# Patient Record
Sex: FEMALE | Race: WHITE | NOT HISPANIC OR LATINO | Employment: FULL TIME | URBAN - METROPOLITAN AREA
[De-identification: names, ages, dates, MRNs, and addresses within clinical notes are randomized per-mention and may not be internally consistent; named-entity substitution may affect disease eponyms.]

---

## 2018-10-11 ENCOUNTER — TRANSCRIBE ORDERS (OUTPATIENT)
Dept: ADMINISTRATIVE | Facility: HOSPITAL | Age: 58
End: 2018-10-11

## 2018-10-11 DIAGNOSIS — Z12.31 VISIT FOR SCREENING MAMMOGRAM: Primary | ICD-10-CM

## 2020-01-03 ENCOUNTER — OFFICE VISIT (OUTPATIENT)
Dept: FAMILY MEDICINE CLINIC | Facility: CLINIC | Age: 60
End: 2020-01-03

## 2020-01-03 VITALS
OXYGEN SATURATION: 95 % | DIASTOLIC BLOOD PRESSURE: 60 MMHG | RESPIRATION RATE: 16 BRPM | HEART RATE: 81 BPM | TEMPERATURE: 98.4 F | BODY MASS INDEX: 24.41 KG/M2 | HEIGHT: 64 IN | SYSTOLIC BLOOD PRESSURE: 128 MMHG | WEIGHT: 143 LBS

## 2020-01-03 DIAGNOSIS — J15.9 COMMUNITY ACQUIRED BACTERIAL PNEUMONIA: Primary | ICD-10-CM

## 2020-01-03 DIAGNOSIS — E03.9 ACQUIRED HYPOTHYROIDISM: ICD-10-CM

## 2020-01-03 DIAGNOSIS — E87.1 HYPONATREMIA: ICD-10-CM

## 2020-01-03 PROCEDURE — 99203 OFFICE O/P NEW LOW 30 MIN: CPT | Performed by: FAMILY MEDICINE

## 2020-01-03 RX ORDER — DOXYCYCLINE HYCLATE 100 MG/1
100 CAPSULE ORAL 2 TIMES DAILY
COMMUNITY
End: 2020-01-09

## 2020-01-03 RX ORDER — CITALOPRAM 40 MG/1
1 TABLET ORAL DAILY
COMMUNITY
Start: 2019-12-12 | End: 2020-06-08 | Stop reason: SDUPTHER

## 2020-01-03 RX ORDER — ALBUTEROL SULFATE 90 UG/1
2 AEROSOL, METERED RESPIRATORY (INHALATION) EVERY 4 HOURS PRN
COMMUNITY
End: 2020-06-08 | Stop reason: SDUPTHER

## 2020-01-03 NOTE — PATIENT INSTRUCTIONS
Go to the nearest ER or return to clinic if symptoms worsen, fever/chill develop    Hyponatremia    Hyponatremia is when the amount of salt (sodium) in your blood is too low. When salt levels are low, your cells absorb extra water and they swell. The swelling happens throughout the body, but it mostly affects the brain.  Follow these instructions at home:  · Take medicines only as told by your doctor. Many medicines can make this condition worse. Talk with your doctor about any medicines that you are currently taking.  · Carefully follow a recommended diet as told by your doctor.  · Carefully follow instructions from your doctor about fluid restrictions.  · Keep all follow-up visits as told by your doctor. This is important.  · Do not drink alcohol.  Contact a doctor if:  · You feel sicker to your stomach (nauseous).  · You feel more confused.  · You feel more tired (fatigued).  · Your headache gets worse.  · You feel weaker.  · Your symptoms go away and then they come back.  · You have trouble following the diet instructions.  Get help right away if:  · You start to twitch and shake (have a seizure).  · You pass out (faint).  · You keep having watery poop (diarrhea).  · You keep throwing up (vomiting).  This information is not intended to replace advice given to you by your health care provider. Make sure you discuss any questions you have with your health care provider.  Document Released: 08/29/2012 Document Revised: 05/25/2017 Document Reviewed: 12/14/2015  [employer] Interactive Patient Education © 2019 [employer]

## 2020-01-03 NOTE — PROGRESS NOTES
Subjective   Florence Dee is a 59 y.o. female.   Chief Complaint   Patient presents with   • Establish Care   • Pneumonia     [facility]     History of Present Illness   She is here to establish care.   She was discharged from [facility] yesterday after being admitted last week to treat sepsis, CAP, hypoxia, asthma exacerbation, and hyponatremia.   History of tobacco abuse, has stopped since hospital admission.   While inpatient, she was placed on low sodium diet and she is unsure why. This is what prolonged her discharge, hyponatremia.   Discharged yesterday evening. Started medrol dose pack and doxycycline this morning. She does have oxygen to wear at home.    She is weak and fatigued. Shortness of breath is improving.    Cough is productive, but sputum production is becoming less. She is using nebulizer every 4 hours as directed.     She has hypothyroidism. Previously had Grave's disease.   Currently treated with levothyroxine 125 mcg daily.    The following portions of the patient's history were reviewed and updated as appropriate: allergies, current medications, past family history, past medical history, past social history, past surgical history and problem list.    Review of Systems   Constitutional: Positive for fatigue. Negative for chills and fever.   HENT: Negative.    Respiratory: Positive for cough and shortness of breath. Negative for chest tightness and wheezing.    Cardiovascular: Negative for chest pain, palpitations and leg swelling.   Endocrine: Negative for cold intolerance and heat intolerance.   Skin: Negative for rash.   Neurological: Negative for light-headedness and headache.   Hematological: Negative for adenopathy. Does not bruise/bleed easily.   Psychiatric/Behavioral: Negative.        Objective   Physical Exam   Constitutional: She is oriented to person, place, and time. She appears well-developed and well-nourished.   HENT:   Head: Normocephalic and atraumatic.   Right Ear: Tympanic  membrane, external ear and ear canal normal.   Left Ear: Tympanic membrane, external ear and ear canal normal.   Nose: Nose normal.   Mouth/Throat: Uvula is midline and oropharynx is clear and moist.   Eyes: Conjunctivae are normal.   Neck: Neck supple. No thyromegaly present.   Cardiovascular: Normal rate, regular rhythm and normal heart sounds.   No murmur heard.  Pulmonary/Chest: Effort normal. She has decreased breath sounds in the right lower field. She has no wheezes. She has no rhonchi.   Musculoskeletal: She exhibits no edema or deformity.   Lymphadenopathy:     She has no cervical adenopathy.   Neurological: She is alert and oriented to person, place, and time.   Skin: Skin is warm and dry.   Psychiatric: She has a normal mood and affect. Her behavior is normal.   Nursing note and vitals reviewed.        Assessment/Plan   Florence was seen today for establish care and pneumonia.    Diagnoses and all orders for this visit:    Community acquired bacterial pneumonia  -     CBC & Differential; Future  -     Comprehensive Metabolic Panel; Future  -     XR Chest PA & Lateral; Future    Hyponatremia  -     CBC & Differential; Future  -     Comprehensive Metabolic Panel; Future    Acquired hypothyroidism  -     TSH+Free T4; Future    She had labs checked yesterday before discharge, hyponatremia still present. Advised her to resume normal diet and return on Monday to recheck this.   Advised to continue holding citalopram until sodium corrects.   Plan to repeat CXR in 1 month. She will notify me if symptoms don't resolve or if they start to worsen.     Will address routine health maintenance on next routine visit.

## 2020-01-07 ENCOUNTER — TELEPHONE (OUTPATIENT)
Dept: FAMILY MEDICINE CLINIC | Facility: CLINIC | Age: 60
End: 2020-01-07

## 2020-01-07 DIAGNOSIS — E87.1 HYPONATREMIA: ICD-10-CM

## 2020-01-07 DIAGNOSIS — J15.9 COMMUNITY ACQUIRED BACTERIAL PNEUMONIA: Primary | ICD-10-CM

## 2020-01-07 LAB
ALBUMIN SERPL-MCNC: 3.8 G/DL (ref 3.5–5.2)
ALBUMIN/GLOB SERPL: 1.2 G/DL
ALP SERPL-CCNC: 113 U/L (ref 39–117)
ALT SERPL-CCNC: 116 U/L (ref 1–33)
AST SERPL-CCNC: 22 U/L (ref 1–32)
BASOPHILS # BLD AUTO: 0.05 10*3/MM3 (ref 0–0.2)
BASOPHILS NFR BLD AUTO: 0.2 % (ref 0–1.5)
BILIRUB SERPL-MCNC: 0.4 MG/DL (ref 0.2–1.2)
BUN SERPL-MCNC: 26 MG/DL (ref 6–20)
BUN/CREAT SERPL: 38.2 (ref 7–25)
CALCIUM SERPL-MCNC: 9.4 MG/DL (ref 8.6–10.5)
CHLORIDE SERPL-SCNC: 91 MMOL/L (ref 98–107)
CO2 SERPL-SCNC: 28.4 MMOL/L (ref 22–29)
CREAT SERPL-MCNC: 0.68 MG/DL (ref 0.57–1)
EOSINOPHIL # BLD AUTO: 0.06 10*3/MM3 (ref 0–0.4)
EOSINOPHIL NFR BLD AUTO: 0.3 % (ref 0.3–6.2)
ERYTHROCYTE [DISTWIDTH] IN BLOOD BY AUTOMATED COUNT: 12.7 % (ref 12.3–15.4)
GLOBULIN SER CALC-MCNC: 3.2 GM/DL
GLUCOSE SERPL-MCNC: 127 MG/DL (ref 65–99)
HCT VFR BLD AUTO: 42 % (ref 34–46.6)
HGB BLD-MCNC: 14.5 G/DL (ref 12–15.9)
IMM GRANULOCYTES # BLD AUTO: 0.35 10*3/MM3 (ref 0–0.05)
IMM GRANULOCYTES NFR BLD AUTO: 1.6 % (ref 0–0.5)
LYMPHOCYTES # BLD AUTO: 1.62 10*3/MM3 (ref 0.7–3.1)
LYMPHOCYTES NFR BLD AUTO: 7.2 % (ref 19.6–45.3)
MCH RBC QN AUTO: 32 PG (ref 26.6–33)
MCHC RBC AUTO-ENTMCNC: 34.5 G/DL (ref 31.5–35.7)
MCV RBC AUTO: 92.7 FL (ref 79–97)
MONOCYTES # BLD AUTO: 0.76 10*3/MM3 (ref 0.1–0.9)
MONOCYTES NFR BLD AUTO: 3.4 % (ref 5–12)
NEUTROPHILS # BLD AUTO: 19.62 10*3/MM3 (ref 1.7–7)
NEUTROPHILS NFR BLD AUTO: 87.3 % (ref 42.7–76)
NRBC BLD AUTO-RTO: 0 /100 WBC (ref 0–0.2)
PLATELET # BLD AUTO: 549 10*3/MM3 (ref 140–450)
POTASSIUM SERPL-SCNC: 4.5 MMOL/L (ref 3.5–5.2)
PROT SERPL-MCNC: 7 G/DL (ref 6–8.5)
RBC # BLD AUTO: 4.53 10*6/MM3 (ref 3.77–5.28)
SODIUM SERPL-SCNC: 133 MMOL/L (ref 136–145)
T4 FREE SERPL-MCNC: 1.47 NG/DL (ref 0.93–1.7)
TSH SERPL DL<=0.005 MIU/L-ACNC: 4.95 UIU/ML (ref 0.27–4.2)
WBC # BLD AUTO: 22.46 10*3/MM3 (ref 3.4–10.8)

## 2020-01-07 NOTE — TELEPHONE ENCOUNTER
Patient called to check to see if her blood work results was back yet. Please call patient once the blood work comes back.    Patient call back [phone]      Please advise

## 2020-01-08 ENCOUNTER — RESULTS ENCOUNTER (OUTPATIENT)
Dept: FAMILY MEDICINE CLINIC | Facility: CLINIC | Age: 60
End: 2020-01-08

## 2020-01-08 DIAGNOSIS — E87.1 HYPONATREMIA: ICD-10-CM

## 2020-01-08 DIAGNOSIS — E03.9 ACQUIRED HYPOTHYROIDISM: ICD-10-CM

## 2020-01-08 DIAGNOSIS — J15.9 COMMUNITY ACQUIRED BACTERIAL PNEUMONIA: ICD-10-CM

## 2020-01-09 ENCOUNTER — OFFICE VISIT (OUTPATIENT)
Dept: FAMILY MEDICINE CLINIC | Facility: CLINIC | Age: 60
End: 2020-01-09

## 2020-01-09 VITALS
SYSTOLIC BLOOD PRESSURE: 100 MMHG | HEART RATE: 82 BPM | DIASTOLIC BLOOD PRESSURE: 60 MMHG | HEIGHT: 64 IN | OXYGEN SATURATION: 95 % | WEIGHT: 140.8 LBS | TEMPERATURE: 97.5 F | RESPIRATION RATE: 16 BRPM | BODY MASS INDEX: 24.04 KG/M2

## 2020-01-09 DIAGNOSIS — R09.02 HYPOXIC: ICD-10-CM

## 2020-01-09 DIAGNOSIS — E87.1 HYPONATREMIA: ICD-10-CM

## 2020-01-09 DIAGNOSIS — J15.9 COMMUNITY ACQUIRED BACTERIAL PNEUMONIA: Primary | ICD-10-CM

## 2020-01-09 PROCEDURE — 99213 OFFICE O/P EST LOW 20 MIN: CPT | Performed by: FAMILY MEDICINE

## 2020-01-09 RX ORDER — ALBUTEROL SULFATE 0.63 MG/3ML
1 SOLUTION RESPIRATORY (INHALATION) DAILY PRN
COMMUNITY
Start: 2020-01-03

## 2020-01-09 NOTE — PATIENT INSTRUCTIONS
Go to the nearest ER or return to clinic if symptoms worsen, fever/chill develop    Community-Acquired Pneumonia, Adult  Pneumonia is an infection of the lungs. It causes swelling in the airways of the lungs. Mucus and fluid may also build up inside the airways.  One type of pneumonia can happen while a person is in a hospital. A different type can happen when a person is not in a hospital (community-acquired pneumonia).   What are the causes?    This condition is caused by germs (viruses, bacteria, or fungi). Some types of germs can be passed from one person to another. This can happen when you breathe in droplets from the cough or sneeze of an infected person.  What increases the risk?  You are more likely to develop this condition if you:  · Have a long-term (chronic) disease, such as:  ? Chronic obstructive pulmonary disease (COPD).  ? Asthma.  ? Cystic fibrosis.  ? Congestive heart failure.  ? Diabetes.  ? Kidney disease.  · Have HIV.  · Have sickle cell disease.  · Have had your spleen removed.  · Do not take good care of your teeth and mouth (poor dental hygiene).  · Have a medical condition that increases the risk of breathing in droplets from your own mouth and nose.  · Have a weakened body defense system (immune system).  · Are a smoker.  · Travel to areas where the germs that cause this illness are common.  · Are around certain animals or the places they live.  What are the signs or symptoms?  · A dry cough.  · A wet (productive) cough.  · Fever.  · Sweating.  · Chest pain. This often happens when breathing deeply or coughing.  · Fast breathing or trouble breathing.  · Shortness of breath.  · Shaking chills.  · Feeling tired (fatigue).  · Muscle aches.  How is this treated?  Treatment for this condition depends on many things. Most adults can be treated at home. In some cases, treatment must happen in a hospital. Treatment may include:  · Medicines given by mouth or through an IV tube.  · Being given  extra oxygen.  · Respiratory therapy.  In rare cases, treatment for very bad pneumonia may include:  · Using a machine to help you breathe.  · Having a procedure to remove fluid from around your lungs.  Follow these instructions at home:  Medicines  · Take over-the-counter and prescription medicines only as told by your doctor.  ? Only take cough medicine if you are losing sleep.  · If you were prescribed an antibiotic medicine, take it as told by your doctor. Do not stop taking the antibiotic even if you start to feel better.  General instructions    · Sleep with your head and neck raised (elevated). You can do this by sleeping in a recliner or by putting a few pillows under your head.  · Rest as needed. Get at least 8 hours of sleep each night.  · Drink enough water to keep your pee (urine) pale yellow.  · Eat a healthy diet that includes plenty of vegetables, fruits, whole grains, low-fat dairy products, and lean protein.  · Do not use any products that contain nicotine or tobacco. These include cigarettes, e-cigarettes, and chewing tobacco. If you need help quitting, ask your doctor.  · Keep all follow-up visits as told by your doctor. This is important.  How is this prevented?  A shot (vaccine) can help prevent pneumonia. Shots are often suggested for:  · People older than 65 years of age.  · People older than 19 years of age who:  ? Are having cancer treatment.  ? Have long-term (chronic) lung disease.  ? Have problems with their body's defense system.  You may also prevent pneumonia if you take these actions:  · Get the flu (influenza) shot every year.  · Go to the dentist as often as told.  · Wash your hands often. If you cannot use soap and water, use hand .  Contact a doctor if:  · You have a fever.  · You lose sleep because your cough medicine does not help.  Get help right away if:  · You are short of breath and it gets worse.  · You have more chest pain.  · Your sickness gets worse. This is  very serious if:  ? You are an older adult.  ? Your body's defense system is weak.  · You cough up blood.  Summary  · Pneumonia is an infection of the lungs.  · Most adults can be treated at home. Some will need treatment in a hospital.  · Drink enough water to keep your pee pale yellow.  · Get at least 8 hours of sleep each night.  This information is not intended to replace advice given to you by your health care provider. Make sure you discuss any questions you have with your health care provider.  Document Released: 06/05/2009 Document Revised: 08/15/2019 Document Reviewed: 08/15/2019  [employer] Interactive Patient Education © 2019 [employer]

## 2020-01-09 NOTE — PROGRESS NOTES
Subjective   Florence Dee is a 59 y.o. female.   Chief Complaint   Patient presents with   • Cough     congestion, cough      History of Present Illness   She continues to have cough, has worsened since her visit last week. She was discharged last week from hospital for treatment of pneumonia.   She is feeling pain in back, lower right. Feels congestion in chest, but unable to produce with cough.   Has oxygen at home, but didn't wear into her appointment today. Oxygen is in car.   She was discharged with doxycycline and medrol dose [place], has completed both.  Repeated labs 3 days ago. Sodium continues to improve, only low by 3 points now. WBC was significantly elevated, however was taking steroid taper at that time.     The following portions of the patient's history were reviewed and updated as appropriate: allergies, current medications, past family history, past medical history, past social history, past surgical history and problem list.    Review of Systems   Constitutional: Positive for activity change and fatigue.   HENT: Negative for congestion and postnasal drip.    Respiratory: Positive for cough, chest tightness, shortness of breath and wheezing.    Cardiovascular: Positive for chest pain (right posterior). Negative for palpitations and leg swelling.   Gastrointestinal: Negative for abdominal pain.   Neurological: Negative for dizziness, light-headedness and confusion.       Objective   Physical Exam   Constitutional: She is oriented to person, place, and time. She appears well-developed and well-nourished.   HENT:   Head: Normocephalic and atraumatic.   Right Ear: External ear normal.   Left Ear: External ear normal.   Nose: Nose normal.   Eyes: Conjunctivae are normal.   Neck: Neck supple.   Cardiovascular: Normal rate, regular rhythm and normal heart sounds.   No murmur heard.  Pulmonary/Chest: Effort normal. She has decreased breath sounds. She has wheezes in the right upper field, the right middle  field, the right lower field, the left upper field, the left middle field and the left lower field. She has rhonchi in the right lower field and the left lower field.   Musculoskeletal: She exhibits no edema.   Neurological: She is alert and oriented to person, place, and time.   Skin: Skin is warm and dry.   Psychiatric: She has a normal mood and affect. Her behavior is normal.   Nursing note and vitals reviewed.        Assessment/Plan   Florence was seen today for cough.    Diagnoses and all orders for this visit:    Community acquired bacterial pneumonia    Hyponatremia    Hypoxic      She was initially 88% RA when she arrived, not wearing her portable oxygen. This did improve with O2 NC.   With her worsening presentation and completing doxycycline and steroid taper, she has been advised to return to ER for further evaluation and treatment. She agrees and plans to return to [facility], where she was previously admitted.

## 2020-01-12 ENCOUNTER — RESULTS ENCOUNTER (OUTPATIENT)
Dept: FAMILY MEDICINE CLINIC | Facility: CLINIC | Age: 60
End: 2020-01-12

## 2020-01-12 DIAGNOSIS — E87.1 HYPONATREMIA: ICD-10-CM

## 2020-01-12 DIAGNOSIS — J15.9 COMMUNITY ACQUIRED BACTERIAL PNEUMONIA: ICD-10-CM

## 2020-01-15 ENCOUNTER — TELEPHONE (OUTPATIENT)
Dept: FAMILY MEDICINE CLINIC | Facility: CLINIC | Age: 60
End: 2020-01-15

## 2020-01-15 RX ORDER — OLMESARTAN MEDOXOMIL 20 MG/1
20 TABLET ORAL DAILY
Qty: 90 TABLET | Refills: 0 | Status: SHIPPED | OUTPATIENT
Start: 2020-01-15 | End: 2020-01-16 | Stop reason: ALTCHOICE

## 2020-01-15 NOTE — TELEPHONE ENCOUNTER
Called and spoke to patient, informed that medication was sent to pharmacy. She states that she did go to the hospital ([facility]) after her appointment on 1/9/2020 and that she is getting discharged today.

## 2020-01-15 NOTE — TELEPHONE ENCOUNTER
Medication refilled.   Did she end up going back to the ER after her last appointment on 1/9/20? She recently had pneumonia.

## 2020-01-16 ENCOUNTER — TELEPHONE (OUTPATIENT)
Dept: FAMILY MEDICINE CLINIC | Facility: CLINIC | Age: 60
End: 2020-01-16

## 2020-01-16 RX ORDER — OLMESARTAN MEDOXOMIL AND HYDROCHLOROTHIAZIDE 40/12.5 40; 12.5 MG/1; MG/1
1 TABLET ORAL DAILY
Qty: 90 TABLET | Refills: 1 | Status: SHIPPED | OUTPATIENT
Start: 2020-01-16 | End: 2020-06-08 | Stop reason: SDUPTHER

## 2020-01-16 NOTE — TELEPHONE ENCOUNTER
Patient states that they didn't have the combination pill in the hospital while she was there. She states that they gave her Benicar 20 mg two tablets and Lasix at that time. She would like to go back to her original prescription if possible. Please send to [facility].

## 2020-01-16 NOTE — TELEPHONE ENCOUNTER
Pt called saying that for years she has taken olmesartan-hctz 40-12.5 MG, but the Rx the was sent to the pharmacy yesterday was olmesartan (BENICAR) 20 MG tablet. Pt would like to know if she should take the new Rx or if what she has previously taken can be sent to the pharmacy?    [address]

## 2020-01-16 NOTE — TELEPHONE ENCOUNTER
Medication changed to initial prescription. Please cancel Benicar 20mg at pharmacy.     Did her sodium level improve while in the hospital the second stay?

## 2020-01-16 NOTE — TELEPHONE ENCOUNTER
Canceled the plain Benicar at pharm and spoke w/ pt. She say's, her sodium level did get better while she was in the hospital.

## 2020-01-16 NOTE — TELEPHONE ENCOUNTER
I refilled what was requested and what was active in her chart. Looking at the discharge summary from her initial hospital stay, she was discharged with Benicar 40 mg daily.     Does she have her most recent discharge medication list? What blood pressure medication is listed? They may have stopped her HCTZ during the initial stay due to low sodium.

## 2020-01-22 ENCOUNTER — OFFICE VISIT (OUTPATIENT)
Dept: FAMILY MEDICINE CLINIC | Facility: CLINIC | Age: 60
End: 2020-01-22

## 2020-01-22 VITALS
DIASTOLIC BLOOD PRESSURE: 70 MMHG | SYSTOLIC BLOOD PRESSURE: 102 MMHG | TEMPERATURE: 97.1 F | WEIGHT: 143.8 LBS | OXYGEN SATURATION: 96 % | HEIGHT: 64 IN | HEART RATE: 96 BPM | RESPIRATION RATE: 16 BRPM | BODY MASS INDEX: 24.55 KG/M2

## 2020-01-22 DIAGNOSIS — Z09 HOSPITAL DISCHARGE FOLLOW-UP: ICD-10-CM

## 2020-01-22 DIAGNOSIS — J15.9 COMMUNITY ACQUIRED BACTERIAL PNEUMONIA: Primary | ICD-10-CM

## 2020-01-22 DIAGNOSIS — R53.83 OTHER FATIGUE: ICD-10-CM

## 2020-01-22 DIAGNOSIS — E03.9 ACQUIRED HYPOTHYROIDISM: ICD-10-CM

## 2020-01-22 PROCEDURE — 99214 OFFICE O/P EST MOD 30 MIN: CPT | Performed by: FAMILY MEDICINE

## 2020-01-22 RX ORDER — BUDESONIDE 0.5 MG/2ML
0.5 INHALANT ORAL 2 TIMES DAILY
COMMUNITY
Start: 2020-01-15 | End: 2020-07-31 | Stop reason: SDUPTHER

## 2020-01-22 NOTE — PROGRESS NOTES
Subjective   Florence Dee is a 59 y.o. female.   Chief Complaint   Patient presents with   • Pneumonia     finished meds yesterday, feeling much better, breathing better     History of Present Illness   She was admitted again to [facility] from 1/9/20 - 1/15/20 due to sepsis, CAP, and AECOPD. She had a prior inpatient admission for the same symptoms, followed up in our office on 1/9/2020 with hypoxia, productive cough, dyspnea and was advised to go back to the hospital for additional treatment. While inpatient, she was initially treated with Solu medrol, Zosyn, and Vancomycin, eventually transitioned to oral Doxycycline and prednisone, prior to her discharge. Completed oral antibiotic and steroid yesterday, Doxycycline and prednisone. Also, still using Pulmicort inhaler. Feeling much better overall, but still fatigued. Cough, dyspnea, and wheezing have resolved. She is no longer requiring home oxygen.   She was provided vitamin b12 injections while in the hospital, but unaware of vitamin b12 deficiency. She is fatigued, but this is gradually improving since she returned home.   Plans to return to work 1/27/2020, will need short term disability forms completed.     The following portions of the patient's history were reviewed and updated as appropriate: allergies, current medications, past family history, past medical history, past social history, past surgical history and problem list.    Review of Systems   Constitutional: Positive for fatigue. Negative for fever and unexpected weight loss.   HENT: Negative.  Negative for congestion.    Eyes: Negative.    Respiratory: Negative for cough, shortness of breath and wheezing.    Cardiovascular: Negative for chest pain, palpitations and leg swelling.   Gastrointestinal: Negative for abdominal pain.   Neurological: Negative for syncope, light-headedness, headache and confusion.   Hematological: Negative for adenopathy.       Objective   Physical Exam   Constitutional: She is  oriented to person, place, and time. She appears well-developed and well-nourished.   HENT:   Head: Normocephalic and atraumatic.   Right Ear: External ear normal.   Left Ear: External ear normal.   Nose: Nose normal.   Eyes: Conjunctivae are normal.   Neck: Neck supple.   Cardiovascular: Normal rate, regular rhythm and normal heart sounds.   No murmur heard.  Pulmonary/Chest: Effort normal and breath sounds normal. No respiratory distress. She has no wheezes. She has no rhonchi.   Musculoskeletal: She exhibits no edema or deformity.   Lymphadenopathy:     She has no cervical adenopathy.   Neurological: She is alert and oriented to person, place, and time.   Skin: Skin is warm and dry.   Psychiatric: She has a normal mood and affect. Her behavior is normal. Thought content normal.   Nursing note and vitals reviewed.        Assessment/Plan   Florence was seen today for pneumonia.    Diagnoses and all orders for this visit:    Community acquired bacterial pneumonia  -     Comprehensive Metabolic Panel  -     CBC & Differential    Acquired hypothyroidism  -     Comprehensive Metabolic Panel  -     CBC & Differential  -     TSH+Free T4    Other fatigue  -     Comprehensive Metabolic Panel  -     CBC & Differential  -     Vitamin B12    Hospital discharge follow-up  -     Comprehensive Metabolic Panel  -     CBC & Differential    Labs updated today.  She has had drastic improvements compared to previous office visit, which was prior to most recent hospital stay.  Advised her to complete chest x-ray in the next 2 to 3 weeks to ensure resolution of pneumonia.  STD form dates will be 12/27/19- 1/26/2020, return to work on 1/27/20.

## 2020-01-22 NOTE — PATIENT INSTRUCTIONS
Go to the nearest ER or return to clinic if symptoms worsen, fever/chill develop    Hypertension, Adult  Hypertension is another name for high blood pressure. High blood pressure forces your heart to work harder to pump blood. This can cause problems over time.  There are two numbers in a blood pressure reading. There is a top number (systolic) over a bottom number (diastolic). It is best to have a blood pressure that is below 120/80. Healthy choices can help lower your blood pressure, or you may need medicine to help lower it.  What are the causes?  The cause of this condition is not known. Some conditions may be related to high blood pressure.  What increases the risk?  · Smoking.  · Having type 2 diabetes mellitus, high cholesterol, or both.  · Not getting enough exercise or physical activity.  · Being overweight.  · Having too much fat, sugar, calories, or salt (sodium) in your diet.  · Drinking too much alcohol.  · Having long-term (chronic) kidney disease.  · Having a family history of high blood pressure.  · Age. Risk increases with age.  · Race. You may be at higher risk if you are .  · Gender. Men are at higher risk than women before age 45. After age 65, women are at higher risk than men.  · Having obstructive sleep apnea.  · Stress.  What are the signs or symptoms?  · High blood pressure may not cause symptoms. Very high blood pressure (hypertensive crisis) may cause:  ? Headache.  ? Feelings of worry or nervousness (anxiety).  ? Shortness of breath.  ? Nosebleed.  ? A feeling of being sick to your stomach (nausea).  ? Throwing up (vomiting).  ? Changes in how you see.  ? Very bad chest pain.  ? Seizures.  How is this treated?  · This condition is treated by making healthy lifestyle changes, such as:  ? Eating healthy foods.  ? Exercising more.  ? Drinking less alcohol.  · Your health care provider may prescribe medicine if lifestyle changes are not enough to get your blood pressure under  control, and if:  ? Your top number is above 130.  ? Your bottom number is above 80.  · Your personal target blood pressure may vary.  Follow these instructions at home:  Eating and drinking    · If told, follow the DASH eating plan. To follow this plan:  ? Fill one half of your plate at each meal with fruits and vegetables.  ? Fill one fourth of your plate at each meal with whole grains. Whole grains include whole-wheat pasta, brown rice, and whole-grain bread.  ? Eat or drink low-fat dairy products, such as skim milk or low-fat yogurt.  ? Fill one fourth of your plate at each meal with low-fat (lean) proteins. Low-fat proteins include fish, chicken without skin, eggs, beans, and tofu.  ? Avoid fatty meat, cured and processed meat, or chicken with skin.  ? Avoid pre-made or processed food.  · Eat less than 1,500 mg of salt each day.  · Do not drink alcohol if:  ? Your doctor tells you not to drink.  ? You are pregnant, may be pregnant, or are planning to become pregnant.  · If you drink alcohol:  ? Limit how much you use to:  § 0-1 drink a day for women.  § 0-2 drinks a day for men.  ? Be aware of how much alcohol is in your drink. In the [place] one drink equals one 12 oz bottle of beer (355 mL), one 5 oz glass of wine (148 mL), or one 1½ oz glass of hard liquor (44 mL).  Lifestyle    · Work with your doctor to stay at a healthy weight or to lose weight. Ask your doctor what the best weight is for you.  · Get at least 30 minutes of exercise most days of the week. This may include walking, swimming, or biking.  · Get at least 30 minutes of exercise that strengthens your muscles (resistance exercise) at least 3 days a week. This may include lifting weights or doing Pilates.  · Do not use any products that contain nicotine or tobacco, such as cigarettes, e-cigarettes, and chewing tobacco. If you need help quitting, ask your doctor.  · Check your blood pressure at home as told by your doctor.  · Keep all follow-up  visits as told by your doctor. This is important.  Medicines  · Take over-the-counter and prescription medicines only as told by your doctor. Follow directions carefully.  · Do not skip doses of blood pressure medicine. The medicine does not work as well if you skip doses. Skipping doses also puts you at risk for problems.  · Ask your doctor about side effects or reactions to medicines that you should watch for.  Contact a doctor if you:  · Think you are having a reaction to the medicine you are taking.  · Have headaches that keep coming back (recurring).  · Feel dizzy.  · Have swelling in your ankles.  · Have trouble with your vision.  Get help right away if you:  · Get a very bad headache.  · Start to feel mixed up (confused).  · Feel weak or numb.  · Feel faint.  · Have very bad pain in your:  ? Chest.  ? Belly (abdomen).  · Throw up more than once.  · Have trouble breathing.  Summary  · Hypertension is another name for high blood pressure.  · High blood pressure forces your heart to work harder to pump blood.  · For most people, a normal blood pressure is less than 120/80.  · Making healthy choices can help lower blood pressure. If your blood pressure does not get lower with healthy choices, you may need to take medicine.  This information is not intended to replace advice given to you by your health care provider. Make sure you discuss any questions you have with your health care provider.  Document Released: 06/05/2009 Document Revised: 08/28/2019 Document Reviewed: 08/28/2019  [employer] Interactive Patient Education © 2019 [employer]

## 2020-01-23 DIAGNOSIS — E87.1 HYPONATREMIA: Primary | ICD-10-CM

## 2020-01-23 LAB
ALBUMIN SERPL-MCNC: 4.3 G/DL (ref 3.5–5.2)
ALBUMIN/GLOB SERPL: 2 G/DL
ALP SERPL-CCNC: 79 U/L (ref 39–117)
ALT SERPL-CCNC: 54 U/L (ref 1–33)
AST SERPL-CCNC: 16 U/L (ref 1–32)
BASOPHILS # BLD AUTO: 0.03 10*3/MM3 (ref 0–0.2)
BASOPHILS NFR BLD AUTO: 0.3 % (ref 0–1.5)
BILIRUB SERPL-MCNC: 0.5 MG/DL (ref 0.2–1.2)
BUN SERPL-MCNC: 14 MG/DL (ref 6–20)
BUN/CREAT SERPL: 20 (ref 7–25)
CALCIUM SERPL-MCNC: 9.7 MG/DL (ref 8.6–10.5)
CHLORIDE SERPL-SCNC: 91 MMOL/L (ref 98–107)
CO2 SERPL-SCNC: 27.9 MMOL/L (ref 22–29)
CREAT SERPL-MCNC: 0.7 MG/DL (ref 0.57–1)
EOSINOPHIL # BLD AUTO: 0.12 10*3/MM3 (ref 0–0.4)
EOSINOPHIL NFR BLD AUTO: 1.1 % (ref 0.3–6.2)
ERYTHROCYTE [DISTWIDTH] IN BLOOD BY AUTOMATED COUNT: 13.3 % (ref 12.3–15.4)
GLOBULIN SER CALC-MCNC: 2.1 GM/DL
GLUCOSE SERPL-MCNC: 85 MG/DL (ref 65–99)
HCT VFR BLD AUTO: 43.7 % (ref 34–46.6)
HGB BLD-MCNC: 15 G/DL (ref 12–15.9)
IMM GRANULOCYTES # BLD AUTO: 0.15 10*3/MM3 (ref 0–0.05)
IMM GRANULOCYTES NFR BLD AUTO: 1.3 % (ref 0–0.5)
LYMPHOCYTES # BLD AUTO: 2.56 10*3/MM3 (ref 0.7–3.1)
LYMPHOCYTES NFR BLD AUTO: 22.6 % (ref 19.6–45.3)
MCH RBC QN AUTO: 32.2 PG (ref 26.6–33)
MCHC RBC AUTO-ENTMCNC: 34.3 G/DL (ref 31.5–35.7)
MCV RBC AUTO: 93.8 FL (ref 79–97)
MONOCYTES # BLD AUTO: 0.96 10*3/MM3 (ref 0.1–0.9)
MONOCYTES NFR BLD AUTO: 8.5 % (ref 5–12)
NEUTROPHILS # BLD AUTO: 7.49 10*3/MM3 (ref 1.7–7)
NEUTROPHILS NFR BLD AUTO: 66.2 % (ref 42.7–76)
NRBC BLD AUTO-RTO: 0 /100 WBC (ref 0–0.2)
PLATELET # BLD AUTO: 433 10*3/MM3 (ref 140–450)
POTASSIUM SERPL-SCNC: 5.1 MMOL/L (ref 3.5–5.2)
PROT SERPL-MCNC: 6.4 G/DL (ref 6–8.5)
RBC # BLD AUTO: 4.66 10*6/MM3 (ref 3.77–5.28)
SODIUM SERPL-SCNC: 133 MMOL/L (ref 136–145)
T4 FREE SERPL-MCNC: 1.45 NG/DL (ref 0.93–1.7)
TSH SERPL DL<=0.005 MIU/L-ACNC: 2.16 UIU/ML (ref 0.27–4.2)
VIT B12 SERPL-MCNC: 681 PG/ML (ref 211–946)
WBC # BLD AUTO: 11.31 10*3/MM3 (ref 3.4–10.8)

## 2020-01-27 ENCOUNTER — TELEPHONE (OUTPATIENT)
Dept: FAMILY MEDICINE CLINIC | Facility: CLINIC | Age: 60
End: 2020-01-27

## 2020-01-27 NOTE — TELEPHONE ENCOUNTER
Called and Spoke to Pt. Informed that Short Term Disability Paperwork has been completed and have been faxed as ordered. Pt voiced understanding. Had no further questions/concerns.     Faxed forms to [facility] Leave of Absence [facility] at [place] at [phone]

## 2020-01-30 ENCOUNTER — TELEPHONE (OUTPATIENT)
Dept: FAMILY MEDICINE CLINIC | Facility: CLINIC | Age: 60
End: 2020-01-30

## 2020-01-30 LAB
ALBUMIN SERPL-MCNC: 3.9 G/DL (ref 3.5–5.2)
ALBUMIN/GLOB SERPL: 1.7 G/DL
ALP SERPL-CCNC: 65 U/L (ref 39–117)
ALT SERPL-CCNC: 22 U/L (ref 1–33)
AST SERPL-CCNC: 15 U/L (ref 1–32)
BASOPHILS # BLD AUTO: 0.02 10*3/MM3 (ref 0–0.2)
BASOPHILS NFR BLD AUTO: 0.3 % (ref 0–1.5)
BILIRUB SERPL-MCNC: 0.4 MG/DL (ref 0.2–1.2)
BUN SERPL-MCNC: 20 MG/DL (ref 6–20)
BUN/CREAT SERPL: 20.8 (ref 7–25)
CALCIUM SERPL-MCNC: 9 MG/DL (ref 8.6–10.5)
CHLORIDE SERPL-SCNC: 102 MMOL/L (ref 98–107)
CO2 SERPL-SCNC: 26.4 MMOL/L (ref 22–29)
CREAT SERPL-MCNC: 0.96 MG/DL (ref 0.57–1)
EOSINOPHIL # BLD AUTO: 0.08 10*3/MM3 (ref 0–0.4)
EOSINOPHIL NFR BLD AUTO: 1.3 % (ref 0.3–6.2)
ERYTHROCYTE [DISTWIDTH] IN BLOOD BY AUTOMATED COUNT: 13.2 % (ref 12.3–15.4)
GLOBULIN SER CALC-MCNC: 2.3 GM/DL
GLUCOSE SERPL-MCNC: 99 MG/DL (ref 65–99)
HCT VFR BLD AUTO: 37 % (ref 34–46.6)
HGB BLD-MCNC: 12.7 G/DL (ref 12–15.9)
IMM GRANULOCYTES # BLD AUTO: 0.03 10*3/MM3 (ref 0–0.05)
IMM GRANULOCYTES NFR BLD AUTO: 0.5 % (ref 0–0.5)
LYMPHOCYTES # BLD AUTO: 1.67 10*3/MM3 (ref 0.7–3.1)
LYMPHOCYTES NFR BLD AUTO: 26.9 % (ref 19.6–45.3)
MCH RBC QN AUTO: 32.5 PG (ref 26.6–33)
MCHC RBC AUTO-ENTMCNC: 34.3 G/DL (ref 31.5–35.7)
MCV RBC AUTO: 94.6 FL (ref 79–97)
MONOCYTES # BLD AUTO: 0.55 10*3/MM3 (ref 0.1–0.9)
MONOCYTES NFR BLD AUTO: 8.9 % (ref 5–12)
NEUTROPHILS # BLD AUTO: 3.86 10*3/MM3 (ref 1.7–7)
NEUTROPHILS NFR BLD AUTO: 62.1 % (ref 42.7–76)
NRBC BLD AUTO-RTO: 0 /100 WBC (ref 0–0.2)
PLATELET # BLD AUTO: 268 10*3/MM3 (ref 140–450)
POTASSIUM SERPL-SCNC: 4.8 MMOL/L (ref 3.5–5.2)
PROT SERPL-MCNC: 6.2 G/DL (ref 6–8.5)
RBC # BLD AUTO: 3.91 10*6/MM3 (ref 3.77–5.28)
SODIUM SERPL-SCNC: 137 MMOL/L (ref 136–145)
WBC # BLD AUTO: 6.21 10*3/MM3 (ref 3.4–10.8)

## 2020-01-30 NOTE — TELEPHONE ENCOUNTER
PT called saying that her disability paperwork was received, but they also needed to get the office notes. [facility] faxed another form over yesterday requesting the office notes. Can you please look into this and get back with the PT. Her paperwork is due today.

## 2020-02-06 ENCOUNTER — RESULTS ENCOUNTER (OUTPATIENT)
Dept: FAMILY MEDICINE CLINIC | Facility: CLINIC | Age: 60
End: 2020-02-06

## 2020-02-06 DIAGNOSIS — E87.1 HYPONATREMIA: ICD-10-CM

## 2020-06-08 RX ORDER — MONTELUKAST SODIUM 10 MG/1
10 TABLET ORAL NIGHTLY
Qty: 90 TABLET | Refills: 0 | Status: SHIPPED | OUTPATIENT
Start: 2020-06-08 | End: 2020-07-31 | Stop reason: SDUPTHER

## 2020-06-08 RX ORDER — ALBUTEROL SULFATE 90 UG/1
2 AEROSOL, METERED RESPIRATORY (INHALATION) EVERY 4 HOURS PRN
Qty: 8 G | Refills: 2 | Status: SHIPPED | OUTPATIENT
Start: 2020-06-08 | End: 2020-07-31 | Stop reason: SDUPTHER

## 2020-06-08 RX ORDER — OLMESARTAN MEDOXOMIL AND HYDROCHLOROTHIAZIDE 40/12.5 40; 12.5 MG/1; MG/1
1 TABLET ORAL DAILY
Qty: 90 TABLET | Refills: 0 | Status: SHIPPED | OUTPATIENT
Start: 2020-06-08 | End: 2020-07-31 | Stop reason: SDUPTHER

## 2020-06-08 RX ORDER — LEVOTHYROXINE SODIUM 0.12 MG/1
125 TABLET ORAL DAILY
Qty: 90 TABLET | Refills: 0 | Status: SHIPPED | OUTPATIENT
Start: 2020-06-08 | End: 2020-10-05

## 2020-06-08 RX ORDER — CITALOPRAM 40 MG/1
40 TABLET ORAL DAILY
Qty: 90 TABLET | Refills: 0 | Status: SHIPPED | OUTPATIENT
Start: 2020-06-08 | End: 2020-07-31 | Stop reason: SDUPTHER

## 2020-06-08 NOTE — TELEPHONE ENCOUNTER
Due for routine follow up. Please schedule before next refills are due. 90 day supply sent to pharmacy.

## 2020-06-08 NOTE — TELEPHONE ENCOUNTER
NEEDS REFILLS, SHE IS OUT :    levothyroxine (SYNTHROID, LEVOTHROID) 125 MCG tablet        Sig - Route: Take 125 mcg by mouth Daily. - Oral      citalopram (CeleXA) 40 MG tablet   2019     Si tablet Daily.      montelukast (SINGULAIR) 10 MG tablet        Sig - Route: Take 10 mg by mouth Every Night. - Oral      olmesartan-hydrochlorothiazide (BENICAR HCT) 40-12.5 MG per tablet 90 tablet 1 2020     Sig - Route: Take 1 tablet by mouth Daily. - Oral      albuterol sulfate  (90 Base) MCG/ACT inhaler        Sig - Route: Inhale 2 puffs Every 4 (Four) Hours As Needed. - Inhalation    Class: [facility]    Pharmacy     [address] - [address] AT [facility] - [phone]  - [phone] FX

## 2020-07-31 ENCOUNTER — OFFICE VISIT (OUTPATIENT)
Dept: FAMILY MEDICINE CLINIC | Facility: CLINIC | Age: 60
End: 2020-07-31

## 2020-07-31 VITALS
SYSTOLIC BLOOD PRESSURE: 115 MMHG | RESPIRATION RATE: 16 BRPM | DIASTOLIC BLOOD PRESSURE: 78 MMHG | BODY MASS INDEX: 24.79 KG/M2 | HEIGHT: 64 IN | TEMPERATURE: 97.5 F | HEART RATE: 88 BPM | WEIGHT: 145.2 LBS

## 2020-07-31 DIAGNOSIS — Z11.59 ENCOUNTER FOR HEPATITIS C SCREENING TEST FOR LOW RISK PATIENT: ICD-10-CM

## 2020-07-31 DIAGNOSIS — E55.9 VITAMIN D DEFICIENCY: ICD-10-CM

## 2020-07-31 DIAGNOSIS — Z13.220 SCREENING FOR HYPERLIPIDEMIA: ICD-10-CM

## 2020-07-31 DIAGNOSIS — Z12.39 SCREENING FOR BREAST CANCER: ICD-10-CM

## 2020-07-31 DIAGNOSIS — I10 ESSENTIAL HYPERTENSION: ICD-10-CM

## 2020-07-31 DIAGNOSIS — Z12.11 SCREENING FOR COLON CANCER: ICD-10-CM

## 2020-07-31 DIAGNOSIS — E03.9 ACQUIRED HYPOTHYROIDISM: Primary | ICD-10-CM

## 2020-07-31 DIAGNOSIS — J45.20 MILD INTERMITTENT ASTHMA WITHOUT COMPLICATION: ICD-10-CM

## 2020-07-31 PROCEDURE — 99214 OFFICE O/P EST MOD 30 MIN: CPT | Performed by: FAMILY MEDICINE

## 2020-07-31 RX ORDER — BUDESONIDE 0.5 MG/2ML
0.5 INHALANT ORAL 2 TIMES DAILY
Qty: 120 ML | Refills: 5 | Status: SHIPPED | OUTPATIENT
Start: 2020-07-31 | End: 2022-01-26

## 2020-07-31 RX ORDER — MONTELUKAST SODIUM 10 MG/1
10 TABLET ORAL NIGHTLY
Qty: 90 TABLET | Refills: 1 | Status: SHIPPED | OUTPATIENT
Start: 2020-07-31 | End: 2021-04-12

## 2020-07-31 RX ORDER — OLMESARTAN MEDOXOMIL AND HYDROCHLOROTHIAZIDE 40/12.5 40; 12.5 MG/1; MG/1
1 TABLET ORAL DAILY
Qty: 90 TABLET | Refills: 1 | Status: SHIPPED | OUTPATIENT
Start: 2020-07-31 | End: 2020-10-30 | Stop reason: DRUGHIGH

## 2020-07-31 RX ORDER — CITALOPRAM 40 MG/1
40 TABLET ORAL DAILY
Qty: 90 TABLET | Refills: 1 | Status: SHIPPED | OUTPATIENT
Start: 2020-07-31 | End: 2021-04-12

## 2020-07-31 RX ORDER — ALBUTEROL SULFATE 90 UG/1
2 AEROSOL, METERED RESPIRATORY (INHALATION) EVERY 4 HOURS PRN
Qty: 8 G | Refills: 2 | Status: SHIPPED | OUTPATIENT
Start: 2020-07-31 | End: 2021-05-07 | Stop reason: SDUPTHER

## 2020-07-31 NOTE — PATIENT INSTRUCTIONS
Go to the nearest ER or return to clinic if symptoms worsen, fever/chill develop    Hypothyroidism    Hypothyroidism is when the thyroid gland does not make enough of certain hormones (it is underactive). The thyroid gland is a small gland located in the lower front part of the neck, just in front of the windpipe (trachea). This gland makes hormones that help control how the body uses food for energy (metabolism) as well as how the heart and brain function. These hormones also play a role in keeping your bones strong. When the thyroid is underactive, it produces too little of the hormones thyroxine (T4) and triiodothyronine (T3).  What are the causes?  This condition may be caused by:  · Hashimoto's disease. This is a disease in which the body's disease-fighting system (immune system) attacks the thyroid gland. This is the most common cause.  · Viral infections.  · Pregnancy.  · Certain medicines.  · Birth defects.  · Past radiation treatments to the head or neck for cancer.  · Past treatment with radioactive iodine.  · Past exposure to radiation in the environment.  · Past surgical removal of part or all of the thyroid.  · Problems with a gland in the center of the brain (pituitary gland).  · Lack of enough iodine in the diet.  What increases the risk?  You are more likely to develop this condition if:  · You are female.  · You have a family history of thyroid conditions.  · You use a medicine called lithium.  · You take medicines that affect the immune system (immunosuppressants).  What are the signs or symptoms?  Symptoms of this condition include:  · Feeling as though you have no energy (lethargy).  · Not being able to tolerate cold.  · Weight gain that is not explained by a change in diet or exercise habits.  · Lack of appetite.  · Dry skin.  · Coarse hair.  · Menstrual irregularity.  · Slowing of thought processes.  · Constipation.  · Sadness or depression.  How is this diagnosed?  This condition may be  diagnosed based on:  · Your symptoms, your medical history, and a physical exam.  · Blood tests.  You may also have imaging tests, such as an ultrasound or MRI.  How is this treated?  This condition is treated with medicine that replaces the thyroid hormones that your body does not make. After you begin treatment, it may take several weeks for symptoms to go away.  Follow these instructions at home:  · Take over-the-counter and prescription medicines only as told by your health care provider.  · If you start taking any new medicines, tell your health care provider.  · Keep all follow-up visits as told by your health care provider. This is important.  ? As your condition improves, your dosage of thyroid hormone medicine may change.  ? You will need to have blood tests regularly so that your health care provider can monitor your condition.  Contact a health care provider if:  · Your symptoms do not get better with treatment.  · You are taking thyroid replacement medicine and you:  ? Sweat a lot.  ? Have tremors.  ? Feel anxious.  ? Lose weight rapidly.  ? Cannot tolerate heat.  ? Have emotional swings.  ? Have diarrhea.  ? Feel weak.  Get help right away if you have:  · Chest pain.  · An irregular heartbeat.  · A rapid heartbeat.  · Difficulty breathing.  Summary  · Hypothyroidism is when the thyroid gland does not make enough of certain hormones (it is underactive).  · When the thyroid is underactive, it produces too little of the hormones thyroxine (T4) and triiodothyronine (T3).  · The most common cause is Hashimoto's disease, a disease in which the body's disease-fighting system (immune system) attacks the thyroid gland. The condition can also be caused by viral infections, medicine, pregnancy, or past radiation treatment to the head or neck.  · Symptoms may include weight gain, dry skin, constipation, feeling as though you do not have energy, and not being able to tolerate cold.  · This condition is treated with  medicine to replace the thyroid hormones that your body does not make.  This information is not intended to replace advice given to you by your health care provider. Make sure you discuss any questions you have with your health care provider.  Document Released: 12/18/2006 Document Revised: 11/30/2018 Document Reviewed: 11/28/2018  Elsevier Patient Education © 2020 [employer] Inc[employer]

## 2020-07-31 NOTE — PROGRESS NOTES
Subjective   Florence Dee is a 60 y.o. female.   Chief Complaint   Patient presents with   • Follow-up   • Hypothyroidism   • Hypertension     Hypertension   This is a chronic problem. The current episode started more than 1 month ago. The problem is controlled. Pertinent negatives include no chest pain, palpitations or shortness of breath. Agents associated with hypertension include thyroid hormones. Past treatments include angiotensin blockers and diuretics. Current antihypertension treatment includes angiotensin blockers and diuretics. The current treatment provides significant improvement.      Hypothyroidism  Florence Dee is a 60 y.o. female who presents for follow up of hypothyroidism. Current symptoms: none . Patient denies diarrhea, heat / cold intolerance, palpitations and weight changes. Symptoms have stabilized.    Mood is stable with Citalopram.    Uses nebulizer and inhaler as prescribed. Breathing has been well, no dyspnea, wheezing, or chest tightness.     Has previously had low vitamin D, would like this level checked with labs.    The following portions of the patient's history were reviewed and updated as appropriate: allergies, current medications, past family history, past medical history, past social history, past surgical history and problem list.    Review of Systems   Constitutional: Negative for chills, fatigue and fever.   HENT: Negative.    Eyes: Negative.    Respiratory: Negative for cough, chest tightness and shortness of breath.    Cardiovascular: Negative for chest pain and palpitations.   Skin: Negative for rash.   Neurological: Negative for light-headedness, headache and confusion.   Hematological: Negative for adenopathy. Does not bruise/bleed easily.   Psychiatric/Behavioral: Negative.        Objective   Physical Exam   Constitutional: She appears well-developed and well-nourished.   HENT:   Head: Normocephalic and atraumatic.   Right Ear: External ear normal.   Left Ear:  External ear normal.   Nose: Nose normal.   Eyes: Conjunctivae are normal.   Neck: Neck supple.   Cardiovascular: Normal rate, regular rhythm and normal heart sounds.   No murmur heard.  Pulmonary/Chest: Effort normal and breath sounds normal.   Musculoskeletal: She exhibits no deformity.   Neurological: She is alert.   Skin: Skin is warm and dry.   Psychiatric: Her behavior is normal.   Nursing note and vitals reviewed.        Assessment/Plan   Florence was seen today for follow-up, hypothyroidism and hypertension.    Diagnoses and all orders for this visit:    Acquired hypothyroidism  -     CBC & Differential  -     Comprehensive Metabolic Panel  -     TSH+Free T4    Essential hypertension  -     olmesartan-hydrochlorothiazide (Benicar HCT) 40-12.5 MG per tablet; Take 1 tablet by mouth Daily.    Vitamin D deficiency  -     Vitamin D 25 Hydroxy    Mild intermittent asthma without complication  -     montelukast (SINGULAIR) 10 MG tablet; Take 1 tablet by mouth Every Night.  -     budesonide (Pulmicort) 0.5 MG/2ML nebulizer solution; Take 2 mL by nebulization 2 (Two) Times a Day.  -     albuterol sulfate  (90 Base) MCG/ACT inhaler; Inhale 2 puffs Every 4 (Four) Hours As Needed for Wheezing or Shortness of Air.    Screening for hyperlipidemia  -     Lipid Panel    Encounter for hepatitis C screening test for low risk patient  -     Hepatitis C Antibody    Screening for colon cancer  -     Ambulatory Referral For Screening Colonoscopy    Screening for breast cancer  -     Mammo Screening Digital Tomosynthesis Bilateral With CAD    Other orders  -     citalopram (CeleXA) 40 MG tablet; Take 1 tablet by mouth Daily.      Labs updated today.   Encouraged to complete screening colonoscopy and mammogram.

## 2020-08-01 LAB
25(OH)D3+25(OH)D2 SERPL-MCNC: 46.6 NG/ML (ref 30–100)
ALBUMIN SERPL-MCNC: 4.5 G/DL (ref 3.5–5.2)
ALBUMIN/GLOB SERPL: 2.4 G/DL
ALP SERPL-CCNC: 70 U/L (ref 39–117)
ALT SERPL-CCNC: 26 U/L (ref 1–33)
AST SERPL-CCNC: 22 U/L (ref 1–32)
BASOPHILS # BLD AUTO: 0.04 10*3/MM3 (ref 0–0.2)
BASOPHILS NFR BLD AUTO: 0.8 % (ref 0–1.5)
BILIRUB SERPL-MCNC: 0.6 MG/DL (ref 0–1.2)
BUN SERPL-MCNC: 17 MG/DL (ref 6–20)
BUN/CREAT SERPL: 21.5 (ref 7–25)
CALCIUM SERPL-MCNC: 9.4 MG/DL (ref 8.6–10.5)
CHLORIDE SERPL-SCNC: 100 MMOL/L (ref 98–107)
CHOLEST SERPL-MCNC: 296 MG/DL (ref 0–200)
CO2 SERPL-SCNC: 29.8 MMOL/L (ref 22–29)
CREAT SERPL-MCNC: 0.79 MG/DL (ref 0.57–1)
EOSINOPHIL # BLD AUTO: 0.25 10*3/MM3 (ref 0–0.4)
EOSINOPHIL NFR BLD AUTO: 5.3 % (ref 0.3–6.2)
ERYTHROCYTE [DISTWIDTH] IN BLOOD BY AUTOMATED COUNT: 14 % (ref 12.3–15.4)
GLOBULIN SER CALC-MCNC: 1.9 GM/DL
GLUCOSE SERPL-MCNC: 103 MG/DL (ref 65–99)
HCT VFR BLD AUTO: 39.9 % (ref 34–46.6)
HCV AB S/CO SERPL IA: <0.1 S/CO RATIO (ref 0–0.9)
HDLC SERPL-MCNC: 59 MG/DL (ref 40–60)
HGB BLD-MCNC: 13.9 G/DL (ref 12–15.9)
IMM GRANULOCYTES # BLD AUTO: 0.03 10*3/MM3 (ref 0–0.05)
IMM GRANULOCYTES NFR BLD AUTO: 0.6 % (ref 0–0.5)
LDLC SERPL CALC-MCNC: 197 MG/DL (ref 0–100)
LYMPHOCYTES # BLD AUTO: 1.71 10*3/MM3 (ref 0.7–3.1)
LYMPHOCYTES NFR BLD AUTO: 35.9 % (ref 19.6–45.3)
MCH RBC QN AUTO: 32.9 PG (ref 26.6–33)
MCHC RBC AUTO-ENTMCNC: 34.8 G/DL (ref 31.5–35.7)
MCV RBC AUTO: 94.5 FL (ref 79–97)
MONOCYTES # BLD AUTO: 0.31 10*3/MM3 (ref 0.1–0.9)
MONOCYTES NFR BLD AUTO: 6.5 % (ref 5–12)
NEUTROPHILS # BLD AUTO: 2.42 10*3/MM3 (ref 1.7–7)
NEUTROPHILS NFR BLD AUTO: 50.9 % (ref 42.7–76)
NRBC BLD AUTO-RTO: 0 /100 WBC (ref 0–0.2)
PLATELET # BLD AUTO: 252 10*3/MM3 (ref 140–450)
POTASSIUM SERPL-SCNC: 5.3 MMOL/L (ref 3.5–5.2)
PROT SERPL-MCNC: 6.4 G/DL (ref 6–8.5)
RBC # BLD AUTO: 4.22 10*6/MM3 (ref 3.77–5.28)
SODIUM SERPL-SCNC: 137 MMOL/L (ref 136–145)
T4 FREE SERPL-MCNC: 1.08 NG/DL (ref 0.93–1.7)
TRIGL SERPL-MCNC: 202 MG/DL (ref 0–150)
TSH SERPL DL<=0.005 MIU/L-ACNC: 2.2 UIU/ML (ref 0.27–4.2)
VLDLC SERPL CALC-MCNC: 40.4 MG/DL
WBC # BLD AUTO: 4.76 10*3/MM3 (ref 3.4–10.8)

## 2020-08-02 DIAGNOSIS — E78.2 MIXED HYPERLIPIDEMIA: Primary | ICD-10-CM

## 2020-08-02 DIAGNOSIS — E87.5 HYPERKALEMIA: ICD-10-CM

## 2020-08-06 LAB
HBA1C MFR BLD: 5.3 % (ref 4.8–5.6)
WRITTEN AUTHORIZATION: NORMAL

## 2020-08-06 RX ORDER — ROSUVASTATIN CALCIUM 20 MG/1
20 TABLET, COATED ORAL NIGHTLY
Qty: 90 TABLET | Refills: 1 | Status: SHIPPED | OUTPATIENT
Start: 2020-08-06 | End: 2020-11-04 | Stop reason: SDUPTHER

## 2020-08-07 ENCOUNTER — TELEPHONE (OUTPATIENT)
Dept: FAMILY MEDICINE CLINIC | Facility: CLINIC | Age: 60
End: 2020-08-07

## 2020-08-07 NOTE — TELEPHONE ENCOUNTER
Pt called to inquire about lab results. Informed patient of the results as ordered and listed below. Pt stated she received a call from someone in the lab yesterday telling her the A1C was added too late and that she will need to return to get it redrawn. Asked if she could get this the same day as her Repeat BMP next week. Stated she will  ordered statin and begin taking. Had no further questions/concerns at this time.

## 2020-08-09 ENCOUNTER — RESULTS ENCOUNTER (OUTPATIENT)
Dept: FAMILY MEDICINE CLINIC | Facility: CLINIC | Age: 60
End: 2020-08-09

## 2020-08-09 DIAGNOSIS — E87.5 HYPERKALEMIA: ICD-10-CM

## 2020-09-16 RX ORDER — SODIUM, POTASSIUM,MAG SULFATES 17.5-3.13G
2 SOLUTION, RECONSTITUTED, ORAL ORAL TAKE AS DIRECTED
Qty: 354 ML | Refills: 0 | Status: SHIPPED | OUTPATIENT
Start: 2020-09-16 | End: 2020-10-30

## 2020-09-18 ENCOUNTER — APPOINTMENT (OUTPATIENT)
Dept: PREADMISSION TESTING | Facility: HOSPITAL | Age: 60
End: 2020-09-18

## 2020-09-18 LAB — SARS-COV-2 RNA NOSE QL NAA+PROBE: NOT DETECTED

## 2020-09-18 PROCEDURE — U0004 COV-19 TEST NON-CDC HGH THRU: HCPCS

## 2020-09-18 PROCEDURE — C9803 HOPD COVID-19 SPEC COLLECT: HCPCS

## 2020-09-21 ENCOUNTER — OUTSIDE FACILITY SERVICE (OUTPATIENT)
Dept: GASTROENTEROLOGY | Facility: CLINIC | Age: 60
End: 2020-09-21

## 2020-09-21 PROCEDURE — 45378 DIAGNOSTIC COLONOSCOPY: CPT | Performed by: INTERNAL MEDICINE

## 2020-10-05 RX ORDER — LEVOTHYROXINE SODIUM 0.12 MG/1
TABLET ORAL
Qty: 90 TABLET | Refills: 1 | Status: SHIPPED | OUTPATIENT
Start: 2020-10-05 | End: 2021-04-12

## 2020-10-26 ENCOUNTER — E-VISIT (OUTPATIENT)
Dept: FAMILY MEDICINE CLINIC | Facility: TELEHEALTH | Age: 60
End: 2020-10-26

## 2020-10-26 DIAGNOSIS — J01.40 ACUTE PANSINUSITIS, RECURRENCE NOT SPECIFIED: Primary | ICD-10-CM

## 2020-10-26 PROCEDURE — 99423 OL DIG E/M SVC 21+ MIN: CPT | Performed by: NURSE PRACTITIONER

## 2020-10-26 RX ORDER — BENZONATATE 200 MG/1
200 CAPSULE ORAL 3 TIMES DAILY PRN
Qty: 21 CAPSULE | Refills: 0 | Status: SHIPPED | OUTPATIENT
Start: 2020-10-26 | End: 2021-03-18

## 2020-10-26 RX ORDER — AMOXICILLIN AND CLAVULANATE POTASSIUM 875; 125 MG/1; MG/1
1 TABLET, FILM COATED ORAL 2 TIMES DAILY
Qty: 20 TABLET | Refills: 0 | Status: SHIPPED | OUTPATIENT
Start: 2020-10-26 | End: 2020-11-05

## 2020-10-26 RX ORDER — METHYLPREDNISOLONE 4 MG/1
TABLET ORAL
Qty: 21 TABLET | Refills: 0 | Status: SHIPPED | OUTPATIENT
Start: 2020-10-26 | End: 2021-03-18

## 2020-10-26 NOTE — PROGRESS NOTES
Florence Dee    1960  [id number]    I have reviewed the e-Visit questionnaire and patient's answers, my assessment and plan are as follows:      HPI  1-4 day history of nasal congestion with yellow/green discharge, facial pain and pressure, headache, cough, low-grade fever of 100.0.  She is currently taking allegra.  She has had similar symptoms in the past and been prescribed Augmentin.  She has had sinus surgery and orbital decompression in the past    Review of Systems - Negative except symptoms listed in HPI      Diagnoses and all orders for this visit:    1. Acute pansinusitis, recurrence not specified (Primary)  -     amoxicillin-clavulanate (AUGMENTIN) 875-125 MG per tablet; Take 1 tablet by mouth 2 (Two) Times a Day for 10 days.  Dispense: 20 tablet; Refill: 0  -     methylPREDNISolone (MEDROL) 4 MG dose pack; Take as directed on package instructions.  Dispense: 21 tablet; Refill: 0  -     benzonatate (TESSALON) 200 MG capsule; Take 1 capsule by mouth 3 (Three) Times a Day As Needed for Cough.  Dispense: 21 capsule; Refill: 0  --complete Augmentin as prescribed for sinus infection  --Medrol pack as prescribed to decrease inflammation in sinuses  --Tessalon as needed for cough and throat irritation  --increase intake of clear, decaffeinated fluids to help thin secretions and maintain hydration  --tylenol or ibuprofen for pain and/or fever  --rest as needed    **if at any time, experiences fever AND/OR worsening cough AND/OR shortness of breath, has been advised to go to nearest urgent care or emergency department for evaluation AND/OR testing    **if no improvement in 5-7 days, but not worsening, may schedule a f/u virtual visit or E-visit          Any medications prescribed have been sent electronically to   [facility] [address] [place] [address] - [address] [facility] - [phone]  - [phone] FX  [address] [facility] [address]  [address]  [facility] [address]  Phone: [phone]  Fax: [phone]      Time Documentation  Counseled patient  Counseling topics: diagnosis, treatment options and return instructions  Total encounter time: counseling time more than 50% of visit: 30 minutes        OSITO Allen  10/26/20  11:10 EDT

## 2020-10-30 ENCOUNTER — OFFICE VISIT (OUTPATIENT)
Dept: FAMILY MEDICINE CLINIC | Facility: CLINIC | Age: 60
End: 2020-10-30

## 2020-10-30 VITALS
BODY MASS INDEX: 25.1 KG/M2 | HEART RATE: 76 BPM | HEIGHT: 64 IN | DIASTOLIC BLOOD PRESSURE: 64 MMHG | RESPIRATION RATE: 16 BRPM | OXYGEN SATURATION: 96 % | SYSTOLIC BLOOD PRESSURE: 94 MMHG | WEIGHT: 147 LBS | TEMPERATURE: 98 F

## 2020-10-30 DIAGNOSIS — Z00.00 ANNUAL PHYSICAL EXAM: Primary | ICD-10-CM

## 2020-10-30 DIAGNOSIS — E78.2 MIXED HYPERLIPIDEMIA: ICD-10-CM

## 2020-10-30 DIAGNOSIS — Z23 NEED FOR 23-POLYVALENT PNEUMOCOCCAL POLYSACCHARIDE VACCINE: ICD-10-CM

## 2020-10-30 DIAGNOSIS — Z12.4 PAP SMEAR FOR CERVICAL CANCER SCREENING: ICD-10-CM

## 2020-10-30 DIAGNOSIS — I10 ESSENTIAL HYPERTENSION: ICD-10-CM

## 2020-10-30 DIAGNOSIS — Z23 NEEDS FLU SHOT: ICD-10-CM

## 2020-10-30 DIAGNOSIS — Z12.31 ENCOUNTER FOR SCREENING MAMMOGRAM FOR MALIGNANT NEOPLASM OF BREAST: ICD-10-CM

## 2020-10-30 PROBLEM — F17.200 CURRENT SMOKER: Status: ACTIVE | Noted: 2020-10-30

## 2020-10-30 PROBLEM — J43.9 PULMONARY EMPHYSEMA: Status: ACTIVE | Noted: 2020-10-30

## 2020-10-30 PROCEDURE — 99396 PREV VISIT EST AGE 40-64: CPT | Performed by: FAMILY MEDICINE

## 2020-10-30 PROCEDURE — 90732 PPSV23 VACC 2 YRS+ SUBQ/IM: CPT | Performed by: FAMILY MEDICINE

## 2020-10-30 PROCEDURE — 90686 IIV4 VACC NO PRSV 0.5 ML IM: CPT | Performed by: FAMILY MEDICINE

## 2020-10-30 PROCEDURE — 90471 IMMUNIZATION ADMIN: CPT | Performed by: FAMILY MEDICINE

## 2020-10-30 PROCEDURE — 90472 IMMUNIZATION ADMIN EACH ADD: CPT | Performed by: FAMILY MEDICINE

## 2020-10-30 RX ORDER — OLMESARTAN MEDOXOMIL AND HYDROCHLOROTHIAZIDE 20/12.5 20; 12.5 MG/1; MG/1
1 TABLET ORAL DAILY
Qty: 90 TABLET | Refills: 3 | Status: SHIPPED | OUTPATIENT
Start: 2020-10-30 | End: 2021-11-16

## 2020-10-30 NOTE — PROGRESS NOTES
Subjective   Chief Complaint   Patient presents with   • Annual Exam     w/ pap      History of Present Illness     Florence Dee is a 60 y.o. woman who comes in today for a  pap smear only. Her most recent annual exam was >1 year ago. Her most recent Pap smear was approximately 5 years ago and showed no abnormalities. Previous abnormal Pap smears: no. Contraception: post menopausal status, ablation   LMP: years, had ablation   Dental Exam: up to date  Vision Exam: up to date, usually goes every other year  Exercise: Walks dog daily  Sleep is good.     The following portions of the patient's history were reviewed and updated as appropriate: allergies, current medications, past family history, past medical history, past social history, past surgical history and problem list.    Review of Systems   Constitutional: Negative for unexpected weight gain and unexpected weight loss.   Respiratory: Negative for cough and shortness of breath.    Cardiovascular: Negative for chest pain, palpitations and leg swelling.   Gastrointestinal: Negative for abdominal pain.   Genitourinary: Negative for dysuria, frequency, pelvic pain, pelvic pressure, vaginal discharge and vaginal pain.   Neurological: Negative for light-headedness and headache.   Hematological: Negative.    Psychiatric/Behavioral: Negative.        Objective   Physical Exam  Vitals signs and nursing note reviewed.   Constitutional:       Appearance: Normal appearance. She is well-developed.   HENT:      Head: Normocephalic and atraumatic.      Right Ear: External ear normal.      Left Ear: External ear normal.      Nose: Nose normal.   Eyes:      Extraocular Movements: Extraocular movements intact.      Conjunctiva/sclera: Conjunctivae normal.   Neck:      Musculoskeletal: Neck supple.   Cardiovascular:      Rate and Rhythm: Normal rate and regular rhythm.      Heart sounds: Normal heart sounds. No murmur.   Pulmonary:      Effort: Pulmonary effort is normal.       Breath sounds: Normal breath sounds. No wheezing.   Chest:      Breasts:         Right: Normal. No swelling, bleeding, inverted nipple, mass, nipple discharge, skin change or tenderness.         Left: Normal. No swelling, bleeding, inverted nipple, mass, nipple discharge, skin change or tenderness.   Genitourinary:     Labia:         Right: No rash, tenderness or lesion.         Left: No rash, tenderness or lesion.       Vagina: Normal.      Cervix: Normal.      Uterus: Normal.       Adnexa: Right adnexa normal.        Right: No tenderness.          Left: No tenderness.     Musculoskeletal:         General: No deformity.   Lymphadenopathy:      Cervical: No cervical adenopathy.      Upper Body:      Right upper body: No supraclavicular or axillary adenopathy.      Left upper body: No supraclavicular or axillary adenopathy.   Skin:     General: Skin is warm and dry.   Neurological:      General: No focal deficit present.      Mental Status: She is alert and oriented to person, place, and time.   Psychiatric:         Mood and Affect: Mood normal.         Behavior: Behavior normal.         Thought Content: Thought content normal.           Assessment/Plan   Diagnoses and all orders for this visit:    1. Annual physical exam (Primary)  -     Comprehensive Metabolic Panel    2. Pap smear for cervical cancer screening  -     Comprehensive Metabolic Panel    3. Encounter for screening mammogram for malignant neoplasm of breast  -     Mammo Screening Digital Tomosynthesis Bilateral With CAD  -     Comprehensive Metabolic Panel    4. Needs flu shot  -     Fluarix Quad >6 Months (9063-7608)  -     Comprehensive Metabolic Panel    5. Need for 23-polyvalent pneumococcal polysaccharide vaccine  -     Pneumococcal Polysaccharide Vaccine 23-Valent (PPSV23) Greater Than or Equal To 1yo Subcutaneous / IM  -     Comprehensive Metabolic Panel    6. Essential hypertension  Comments:  BP is low, will decrease dose to Benicar HCT 20-12.5  mg   Orders:  -     olmesartan-hydrochlorothiazide (Benicar HCT) 20-12.5 MG per tablet; Take 1 tablet by mouth Daily.  Dispense: 90 tablet; Refill: 3  -     Lipid Panel With / Chol / HDL Ratio  -     Comprehensive Metabolic Panel    7. Mixed hyperlipidemia  Comments:  Continue statin, FLP updated today   Orders:  -     Lipid Panel With / Chol / HDL Ratio  -     Comprehensive Metabolic Panel    Pap smear updated today      Health advice: healthy food choices with fresh fruits and vegetables, maintain sleep pattern at least 8 hours, avoid texting and distracted driving practices; wear safety belt, engage in regular exercise, maintain healthy weight, use safe sex practices, avoid alcohol and illicit drugs. Maintain immunizations that are up to date. Maintain health maintenance: Mammogram, Pap, etc.  Follow up with PCP if struggling with depression or anxiety. Keep regular dental and eye exams. Brush and floss teeth daily.   F/U annually and prn.

## 2020-10-31 LAB
ALBUMIN SERPL-MCNC: 4.4 G/DL (ref 3.5–5.2)
ALBUMIN/GLOB SERPL: 1.9 G/DL
ALP SERPL-CCNC: 70 U/L (ref 39–117)
ALT SERPL-CCNC: 22 U/L (ref 1–33)
AST SERPL-CCNC: 14 U/L (ref 1–32)
BILIRUB SERPL-MCNC: 0.5 MG/DL (ref 0–1.2)
BUN SERPL-MCNC: 21 MG/DL (ref 6–20)
BUN/CREAT SERPL: 26.6 (ref 7–25)
CALCIUM SERPL-MCNC: 9.4 MG/DL (ref 8.6–10.5)
CHLORIDE SERPL-SCNC: 102 MMOL/L (ref 98–107)
CHOLEST SERPL-MCNC: 301 MG/DL (ref 0–200)
CHOLEST/HDLC SERPL: 4.24 {RATIO}
CO2 SERPL-SCNC: 27.5 MMOL/L (ref 22–29)
CREAT SERPL-MCNC: 0.79 MG/DL (ref 0.57–1)
GLOBULIN SER CALC-MCNC: 2.3 GM/DL
GLUCOSE SERPL-MCNC: 97 MG/DL (ref 65–99)
HDLC SERPL-MCNC: 71 MG/DL (ref 40–60)
LDLC SERPL CALC-MCNC: 190 MG/DL (ref 0–100)
POTASSIUM SERPL-SCNC: 4.7 MMOL/L (ref 3.5–5.2)
PROT SERPL-MCNC: 6.7 G/DL (ref 6–8.5)
SODIUM SERPL-SCNC: 139 MMOL/L (ref 136–145)
TRIGL SERPL-MCNC: 212 MG/DL (ref 0–150)
VLDLC SERPL CALC-MCNC: 40 MG/DL (ref 5–40)

## 2020-11-04 DIAGNOSIS — E78.2 MIXED HYPERLIPIDEMIA: ICD-10-CM

## 2020-11-04 RX ORDER — ROSUVASTATIN CALCIUM 40 MG/1
40 TABLET, COATED ORAL NIGHTLY
Qty: 90 TABLET | Refills: 3 | Status: SHIPPED | OUTPATIENT
Start: 2020-11-04 | End: 2021-11-16

## 2020-11-05 RX ORDER — OLMESARTAN MEDOXOMIL AND HYDROCHLOROTHIAZIDE 40/12.5 40; 12.5 MG/1; MG/1
TABLET ORAL
Qty: 30 TABLET | Refills: 0 | OUTPATIENT
Start: 2020-11-05

## 2021-03-18 ENCOUNTER — OFFICE VISIT (OUTPATIENT)
Dept: FAMILY MEDICINE CLINIC | Facility: CLINIC | Age: 61
End: 2021-03-18

## 2021-03-18 VITALS
HEIGHT: 64 IN | DIASTOLIC BLOOD PRESSURE: 76 MMHG | TEMPERATURE: 97.7 F | BODY MASS INDEX: 25.1 KG/M2 | RESPIRATION RATE: 16 BRPM | HEART RATE: 74 BPM | SYSTOLIC BLOOD PRESSURE: 106 MMHG | OXYGEN SATURATION: 98 % | WEIGHT: 147 LBS

## 2021-03-18 DIAGNOSIS — E03.9 ACQUIRED HYPOTHYROIDISM: ICD-10-CM

## 2021-03-18 DIAGNOSIS — J01.00 ACUTE NON-RECURRENT MAXILLARY SINUSITIS: ICD-10-CM

## 2021-03-18 DIAGNOSIS — I10 ESSENTIAL HYPERTENSION: Primary | ICD-10-CM

## 2021-03-18 DIAGNOSIS — R25.2 MUSCLE CRAMPS: ICD-10-CM

## 2021-03-18 DIAGNOSIS — E55.9 VITAMIN D DEFICIENCY: ICD-10-CM

## 2021-03-18 DIAGNOSIS — E78.2 MIXED HYPERLIPIDEMIA: ICD-10-CM

## 2021-03-18 DIAGNOSIS — Z12.31 ENCOUNTER FOR SCREENING MAMMOGRAM FOR MALIGNANT NEOPLASM OF BREAST: ICD-10-CM

## 2021-03-18 PROCEDURE — 99214 OFFICE O/P EST MOD 30 MIN: CPT | Performed by: FAMILY MEDICINE

## 2021-03-18 RX ORDER — AMOXICILLIN AND CLAVULANATE POTASSIUM 875; 125 MG/1; MG/1
1 TABLET, FILM COATED ORAL 2 TIMES DAILY
Qty: 20 TABLET | Refills: 0 | Status: SHIPPED | OUTPATIENT
Start: 2021-03-18 | End: 2021-03-18

## 2021-03-18 NOTE — PROGRESS NOTES
Chief Complaint  4mo f/u HTN, Hypothroidism; Sinus pressure x 3-4d; and R earache    Subjective          Florence Dee presents to [facility]  Hypertension  This is a chronic problem. The current episode started more than 1 year ago. The problem is controlled. Associated symptoms include headaches. Agents associated with hypertension include thyroid hormones. Risk factors for coronary artery disease include dyslipidemia and smoking/tobacco exposure. Past treatments include angiotensin blockers and diuretics. Current antihypertension treatment includes angiotensin blockers and diuretics. The current treatment provides significant improvement.   Hyperlipidemia  This is a chronic problem. The current episode started more than 1 year ago. She has no history of diabetes or obesity. Factors aggravating her hyperlipidemia include thiazides. Current antihyperlipidemic treatment includes statins. The current treatment provides significant improvement of lipids. There are no compliance problems.  Risk factors for coronary artery disease include dyslipidemia, hypertension and post-menopausal.   Sinusitis  This is a new problem. The current episode started in the past 7 days. The problem is unchanged. There has been no fever. She is experiencing no pain. Associated symptoms include congestion, ear pain, headaches and sinus pressure. Treatments tried: Mucinex sinus. The treatment provided mild relief.   Hypothyroidism  Florence Dee is a 60 y.o. female who presents for follow up of hypothyroidism. Current symptoms: none . Patient denies diarrhea, heat / cold intolerance, palpitations and weight changes. Symptoms have been well-controlled.    She has had muscle cramps in bilateral lower extremities.  She does not consume water often, mainly drinks coffee.    The following portions of the patient's history were reviewed and updated as appropriate: allergies, current medications, past family  "history, past medical history, past social history, past surgical history and problem list.    Objective   Vital Signs:   /76   Pulse 74   Temp 97.7 °F (36.5 °C)   Resp 16   Ht 162 cm (63.78\")   Wt 66.7 kg (147 lb)   SpO2 98%   BMI 25.41 kg/m²     Physical Exam  Vitals and nursing note reviewed.   Constitutional:       Appearance: She is well-developed.   HENT:      Head: Normocephalic and atraumatic.      Right Ear: Tympanic membrane and external ear normal.      Left Ear: Hearing, tympanic membrane, ear canal and external ear normal.      Ears:      Comments: Scratches within right ear canal      Nose: Congestion present.      Mouth/Throat:      Lips: Pink.      Mouth: Mucous membranes are moist.      Tongue: No lesions.      Pharynx: Oropharyngeal exudate present. No posterior oropharyngeal erythema.   Eyes:      Conjunctiva/sclera: Conjunctivae normal.   Cardiovascular:      Rate and Rhythm: Normal rate and regular rhythm.      Heart sounds: Normal heart sounds.   Pulmonary:      Effort: Pulmonary effort is normal.      Breath sounds: Normal breath sounds. No wheezing or rhonchi.   Musculoskeletal:         General: No swelling or deformity.      Cervical back: Neck supple.   Skin:     General: Skin is warm and dry.   Neurological:      General: No focal deficit present.      Mental Status: She is alert and oriented to person, place, and time.   Psychiatric:         Mood and Affect: Mood normal.         Behavior: Behavior normal.        Result Review :                 Assessment and Plan    Diagnoses and all orders for this visit:    1. Essential hypertension (Primary)  -     CBC & Differential  -     Comprehensive Metabolic Panel  -     Lipid Panel With / Chol / HDL Ratio    2. Mixed hyperlipidemia  -     CBC & Differential  -     Comprehensive Metabolic Panel  -     Lipid Panel With / Chol / HDL Ratio    3. Acquired hypothyroidism  -     CBC & Differential  -     Comprehensive Metabolic Panel  -    "  Lipid Panel With / Chol / HDL Ratio  -     TSH+Free T4    4. Vitamin D deficiency  -     CBC & Differential  -     Comprehensive Metabolic Panel  -     Vitamin D 25 Hydroxy    5. Muscle cramps  -     Magnesium  -     CBC & Differential  -     Comprehensive Metabolic Panel    6. Acute non-recurrent maxillary sinusitis  -   Amoxicillin-clavulanate (Augmentin) 875-125 MG per tablet; Take 1 tablet by mouth 2 (Two) Times a Day for 10 days.  Dispense: 20 tablet; Refill: 0    7. Encounter for screening mammogram for malignant neoplasm of breast  -     Mammo Screening Digital Tomosynthesis Bilateral With CAD    Advised her not placed foreign objects in the ear, such as Q-tips.  This has resulted in abrasions within right ear canal.  Sinusitis treated with Augmentin  Fasting labs updated today.      Follow Up   No follow-ups on file.  Patient was given instructions and counseling regarding her condition or for health maintenance advice. Please see specific information pulled into the AVS if appropriate.

## 2021-03-19 LAB
25(OH)D3+25(OH)D2 SERPL-MCNC: 50.7 NG/ML (ref 30–100)
ALBUMIN SERPL-MCNC: 4.7 G/DL (ref 3.8–4.9)
ALBUMIN/GLOB SERPL: 1.7 {RATIO} (ref 1.2–2.2)
ALP SERPL-CCNC: 82 IU/L (ref 39–117)
ALT SERPL-CCNC: 37 IU/L (ref 0–32)
AST SERPL-CCNC: 27 IU/L (ref 0–40)
BASOPHILS # BLD AUTO: 0.1 X10E3/UL (ref 0–0.2)
BASOPHILS NFR BLD AUTO: 1 %
BILIRUB SERPL-MCNC: 0.6 MG/DL (ref 0–1.2)
BUN SERPL-MCNC: 15 MG/DL (ref 6–24)
BUN/CREAT SERPL: 19 (ref 9–23)
CALCIUM SERPL-MCNC: 9.7 MG/DL (ref 8.7–10.2)
CHLORIDE SERPL-SCNC: 96 MMOL/L (ref 96–106)
CHOLEST SERPL-MCNC: 218 MG/DL (ref 100–199)
CHOLEST/HDLC SERPL: 2.4 RATIO (ref 0–4.4)
CO2 SERPL-SCNC: 26 MMOL/L (ref 20–29)
CREAT SERPL-MCNC: 0.79 MG/DL (ref 0.57–1)
EOSINOPHIL # BLD AUTO: 0.2 X10E3/UL (ref 0–0.4)
EOSINOPHIL NFR BLD AUTO: 3 %
ERYTHROCYTE [DISTWIDTH] IN BLOOD BY AUTOMATED COUNT: 12.3 % (ref 11.7–15.4)
GLOBULIN SER CALC-MCNC: 2.7 G/DL (ref 1.5–4.5)
GLUCOSE SERPL-MCNC: 93 MG/DL (ref 65–99)
HCT VFR BLD AUTO: 47.2 % (ref 34–46.6)
HDLC SERPL-MCNC: 89 MG/DL
HGB BLD-MCNC: 16.1 G/DL (ref 11.1–15.9)
IMM GRANULOCYTES # BLD AUTO: 0 X10E3/UL (ref 0–0.1)
IMM GRANULOCYTES NFR BLD AUTO: 0 %
LDLC SERPL CALC-MCNC: 112 MG/DL (ref 0–99)
LYMPHOCYTES # BLD AUTO: 2.5 X10E3/UL (ref 0.7–3.1)
LYMPHOCYTES NFR BLD AUTO: 33 %
MAGNESIUM SERPL-MCNC: 2.2 MG/DL (ref 1.6–2.3)
MCH RBC QN AUTO: 32.3 PG (ref 26.6–33)
MCHC RBC AUTO-ENTMCNC: 34.1 G/DL (ref 31.5–35.7)
MCV RBC AUTO: 95 FL (ref 79–97)
MONOCYTES # BLD AUTO: 0.6 X10E3/UL (ref 0.1–0.9)
MONOCYTES NFR BLD AUTO: 8 %
NEUTROPHILS # BLD AUTO: 4.2 X10E3/UL (ref 1.4–7)
NEUTROPHILS NFR BLD AUTO: 55 %
PLATELET # BLD AUTO: 357 X10E3/UL (ref 150–450)
POTASSIUM SERPL-SCNC: 4.7 MMOL/L (ref 3.5–5.2)
PROT SERPL-MCNC: 7.4 G/DL (ref 6–8.5)
RBC # BLD AUTO: 4.98 X10E6/UL (ref 3.77–5.28)
SODIUM SERPL-SCNC: 137 MMOL/L (ref 134–144)
T4 FREE SERPL-MCNC: 1.25 NG/DL (ref 0.82–1.77)
TRIGL SERPL-MCNC: 96 MG/DL (ref 0–149)
TSH SERPL DL<=0.005 MIU/L-ACNC: 2.01 UIU/ML (ref 0.45–4.5)
VLDLC SERPL CALC-MCNC: 17 MG/DL (ref 5–40)
WBC # BLD AUTO: 7.6 X10E3/UL (ref 3.4–10.8)

## 2021-03-21 DIAGNOSIS — E78.2 MIXED HYPERLIPIDEMIA: Primary | ICD-10-CM

## 2021-03-21 RX ORDER — FENOFIBRATE 48 MG/1
48 TABLET, COATED ORAL DAILY
Qty: 90 TABLET | Refills: 3 | Status: SHIPPED | OUTPATIENT
Start: 2021-03-21 | End: 2022-01-26 | Stop reason: SDUPTHER

## 2021-03-22 ENCOUNTER — IMMUNIZATION (OUTPATIENT)
Dept: VACCINE CLINIC | Facility: HOSPITAL | Age: 61
End: 2021-03-22

## 2021-03-22 PROCEDURE — 0001A: CPT | Performed by: INTERNAL MEDICINE

## 2021-03-22 PROCEDURE — 91300 HC SARSCOV02 VAC 30MCG/0.3ML IM: CPT | Performed by: INTERNAL MEDICINE

## 2021-04-10 DIAGNOSIS — J45.20 MILD INTERMITTENT ASTHMA WITHOUT COMPLICATION: ICD-10-CM

## 2021-04-12 ENCOUNTER — IMMUNIZATION (OUTPATIENT)
Dept: VACCINE CLINIC | Facility: HOSPITAL | Age: 61
End: 2021-04-12

## 2021-04-12 PROCEDURE — 91300 HC SARSCOV02 VAC 30MCG/0.3ML IM: CPT | Performed by: INTERNAL MEDICINE

## 2021-04-12 PROCEDURE — 0002A: CPT | Performed by: INTERNAL MEDICINE

## 2021-04-12 RX ORDER — MONTELUKAST SODIUM 10 MG/1
TABLET ORAL
Qty: 30 TABLET | Refills: 5 | Status: SHIPPED | OUTPATIENT
Start: 2021-04-12 | End: 2021-10-14

## 2021-04-12 RX ORDER — LEVOTHYROXINE SODIUM 0.12 MG/1
TABLET ORAL
Qty: 30 TABLET | Refills: 5 | Status: SHIPPED | OUTPATIENT
Start: 2021-04-12 | End: 2021-10-14

## 2021-04-12 RX ORDER — CITALOPRAM 40 MG/1
TABLET ORAL
Qty: 30 TABLET | Refills: 5 | Status: SHIPPED | OUTPATIENT
Start: 2021-04-12 | End: 2021-10-14

## 2021-05-07 ENCOUNTER — TELEPHONE (OUTPATIENT)
Dept: FAMILY MEDICINE CLINIC | Facility: CLINIC | Age: 61
End: 2021-05-07

## 2021-05-07 DIAGNOSIS — J45.20 MILD INTERMITTENT ASTHMA WITHOUT COMPLICATION: ICD-10-CM

## 2021-05-07 RX ORDER — ALBUTEROL SULFATE 90 UG/1
2 AEROSOL, METERED RESPIRATORY (INHALATION) EVERY 4 HOURS PRN
Qty: 8 G | Refills: 2 | Status: SHIPPED | OUTPATIENT
Start: 2021-05-07 | End: 2021-08-12

## 2021-08-12 DIAGNOSIS — J45.20 MILD INTERMITTENT ASTHMA WITHOUT COMPLICATION: ICD-10-CM

## 2021-08-12 RX ORDER — ALBUTEROL SULFATE 90 UG/1
AEROSOL, METERED RESPIRATORY (INHALATION)
Qty: 8.5 G | Refills: 5 | Status: SHIPPED | OUTPATIENT
Start: 2021-08-12 | End: 2022-01-26 | Stop reason: SDUPTHER

## 2021-08-12 NOTE — TELEPHONE ENCOUNTER
Last Office Visit: 03/18/2021  Next Office Visit: none     Labs completed in past 6 months? yes  Labs completed in past year? yes        [facility] [place], [place] - [address] - [phone]  - [phone] FX   [address] [place] [address]   Phone:  [phone]  Fax:  [phone]    Please review pended refill request for any changes needed on refills or quantities. Thank you!

## 2021-10-14 DIAGNOSIS — J45.20 MILD INTERMITTENT ASTHMA WITHOUT COMPLICATION: ICD-10-CM

## 2021-10-14 RX ORDER — CITALOPRAM 40 MG/1
40 TABLET ORAL DAILY
Qty: 30 TABLET | Refills: 0 | Status: SHIPPED | OUTPATIENT
Start: 2021-10-14 | End: 2021-11-16

## 2021-10-14 RX ORDER — MONTELUKAST SODIUM 10 MG/1
10 TABLET ORAL NIGHTLY
Qty: 30 TABLET | Refills: 0 | Status: SHIPPED | OUTPATIENT
Start: 2021-10-14 | End: 2021-11-16

## 2021-10-14 RX ORDER — LEVOTHYROXINE SODIUM 0.12 MG/1
125 TABLET ORAL DAILY
Qty: 30 TABLET | Refills: 0 | Status: SHIPPED | OUTPATIENT
Start: 2021-10-14 | End: 2021-11-16

## 2021-11-14 DIAGNOSIS — E78.2 MIXED HYPERLIPIDEMIA: ICD-10-CM

## 2021-11-14 DIAGNOSIS — J45.20 MILD INTERMITTENT ASTHMA WITHOUT COMPLICATION: ICD-10-CM

## 2021-11-14 DIAGNOSIS — I10 ESSENTIAL HYPERTENSION: ICD-10-CM

## 2021-11-16 RX ORDER — LEVOTHYROXINE SODIUM 0.12 MG/1
125 TABLET ORAL DAILY
Qty: 30 TABLET | Refills: 0 | Status: SHIPPED | OUTPATIENT
Start: 2021-11-16 | End: 2021-12-15

## 2021-11-16 RX ORDER — ROSUVASTATIN CALCIUM 40 MG/1
40 TABLET, COATED ORAL NIGHTLY
Qty: 30 TABLET | Refills: 0 | Status: SHIPPED | OUTPATIENT
Start: 2021-11-16 | End: 2021-12-15

## 2021-11-16 RX ORDER — OLMESARTAN MEDOXOMIL AND HYDROCHLOROTHIAZIDE 20/12.5 20; 12.5 MG/1; MG/1
1 TABLET ORAL DAILY
Qty: 30 TABLET | Refills: 0 | Status: SHIPPED | OUTPATIENT
Start: 2021-11-16 | End: 2021-12-15

## 2021-11-16 RX ORDER — CITALOPRAM 40 MG/1
40 TABLET ORAL DAILY
Qty: 30 TABLET | Refills: 0 | Status: SHIPPED | OUTPATIENT
Start: 2021-11-16 | End: 2021-12-15

## 2021-11-16 RX ORDER — MONTELUKAST SODIUM 10 MG/1
10 TABLET ORAL NIGHTLY
Qty: 30 TABLET | Refills: 0 | Status: SHIPPED | OUTPATIENT
Start: 2021-11-16 | End: 2021-12-15

## 2021-12-15 DIAGNOSIS — I10 ESSENTIAL HYPERTENSION: ICD-10-CM

## 2021-12-15 DIAGNOSIS — E78.2 MIXED HYPERLIPIDEMIA: ICD-10-CM

## 2021-12-15 DIAGNOSIS — J45.20 MILD INTERMITTENT ASTHMA WITHOUT COMPLICATION: ICD-10-CM

## 2021-12-15 RX ORDER — LEVOTHYROXINE SODIUM 0.12 MG/1
TABLET ORAL
Qty: 14 TABLET | Refills: 0 | Status: SHIPPED | OUTPATIENT
Start: 2021-12-15 | End: 2022-01-05 | Stop reason: SDUPTHER

## 2021-12-15 RX ORDER — OLMESARTAN MEDOXOMIL AND HYDROCHLOROTHIAZIDE 20/12.5 20; 12.5 MG/1; MG/1
TABLET ORAL
Qty: 14 TABLET | Refills: 0 | Status: SHIPPED | OUTPATIENT
Start: 2021-12-15 | End: 2022-01-05 | Stop reason: SDUPTHER

## 2021-12-15 RX ORDER — CITALOPRAM 40 MG/1
TABLET ORAL
Qty: 14 TABLET | Refills: 0 | Status: SHIPPED | OUTPATIENT
Start: 2021-12-15 | End: 2022-01-05 | Stop reason: SDUPTHER

## 2021-12-15 RX ORDER — ROSUVASTATIN CALCIUM 40 MG/1
TABLET, COATED ORAL
Qty: 14 TABLET | Refills: 0 | Status: SHIPPED | OUTPATIENT
Start: 2021-12-15 | End: 2022-01-05 | Stop reason: SDUPTHER

## 2021-12-15 RX ORDER — MONTELUKAST SODIUM 10 MG/1
TABLET ORAL
Qty: 14 TABLET | Refills: 0 | Status: SHIPPED | OUTPATIENT
Start: 2021-12-15 | End: 2022-01-05 | Stop reason: SDUPTHER

## 2021-12-30 DIAGNOSIS — I10 ESSENTIAL HYPERTENSION: ICD-10-CM

## 2021-12-30 DIAGNOSIS — E78.2 MIXED HYPERLIPIDEMIA: ICD-10-CM

## 2021-12-30 DIAGNOSIS — J45.20 MILD INTERMITTENT ASTHMA WITHOUT COMPLICATION: ICD-10-CM

## 2022-01-03 RX ORDER — CITALOPRAM 40 MG/1
TABLET ORAL
Qty: 14 TABLET | Refills: 0 | OUTPATIENT
Start: 2022-01-03

## 2022-01-03 RX ORDER — LEVOTHYROXINE SODIUM 0.12 MG/1
TABLET ORAL
Qty: 14 TABLET | Refills: 0 | OUTPATIENT
Start: 2022-01-03

## 2022-01-03 RX ORDER — ROSUVASTATIN CALCIUM 40 MG/1
TABLET, COATED ORAL
Qty: 14 TABLET | Refills: 0 | OUTPATIENT
Start: 2022-01-03

## 2022-01-03 RX ORDER — MONTELUKAST SODIUM 10 MG/1
TABLET ORAL
Qty: 14 TABLET | Refills: 0 | OUTPATIENT
Start: 2022-01-03

## 2022-01-03 RX ORDER — OLMESARTAN MEDOXOMIL AND HYDROCHLOROTHIAZIDE 20/12.5 20; 12.5 MG/1; MG/1
TABLET ORAL
Qty: 14 TABLET | Refills: 0 | OUTPATIENT
Start: 2022-01-03

## 2022-01-04 DIAGNOSIS — I10 ESSENTIAL HYPERTENSION: ICD-10-CM

## 2022-01-04 DIAGNOSIS — J45.20 MILD INTERMITTENT ASTHMA WITHOUT COMPLICATION: ICD-10-CM

## 2022-01-04 DIAGNOSIS — E78.2 MIXED HYPERLIPIDEMIA: ICD-10-CM

## 2022-01-04 NOTE — TELEPHONE ENCOUNTER
PT called, out of all her medications for three days. Tried getting a prescription but was denied, had to be seen first. Scheduled PT for 1/7 3:45pm, but PT will be off medication for a week by then. Please advise. PT is waiting for a call.

## 2022-01-05 RX ORDER — MONTELUKAST SODIUM 10 MG/1
10 TABLET ORAL NIGHTLY
Qty: 14 TABLET | Refills: 0 | Status: SHIPPED | OUTPATIENT
Start: 2022-01-05 | End: 2022-01-17

## 2022-01-05 RX ORDER — ROSUVASTATIN CALCIUM 40 MG/1
40 TABLET, COATED ORAL DAILY
Qty: 14 TABLET | Refills: 0 | Status: SHIPPED | OUTPATIENT
Start: 2022-01-05 | End: 2022-01-17

## 2022-01-05 RX ORDER — LEVOTHYROXINE SODIUM 0.12 MG/1
125 TABLET ORAL
Qty: 14 TABLET | Refills: 0 | Status: SHIPPED | OUTPATIENT
Start: 2022-01-05 | End: 2022-01-17

## 2022-01-05 RX ORDER — CITALOPRAM 40 MG/1
40 TABLET ORAL DAILY
Qty: 14 TABLET | Refills: 0 | Status: SHIPPED | OUTPATIENT
Start: 2022-01-05 | End: 2022-01-17

## 2022-01-05 RX ORDER — OLMESARTAN MEDOXOMIL AND HYDROCHLOROTHIAZIDE 20/12.5 20; 12.5 MG/1; MG/1
1 TABLET ORAL DAILY
Qty: 14 TABLET | Refills: 0 | Status: SHIPPED | OUTPATIENT
Start: 2022-01-05 | End: 2022-01-17

## 2022-01-05 NOTE — TELEPHONE ENCOUNTER
Caller: Florence Dee    Relationship: Self    Best call back number: [phone]    Requested Prescriptions:   Requested Prescriptions     Pending Prescriptions Disp Refills   • levothyroxine (SYNTHROID, LEVOTHROID) 125 MCG tablet 14 tablet 0   • montelukast (SINGULAIR) 10 MG tablet 14 tablet 0   • citalopram (CeleXA) 40 MG tablet 14 tablet 0   • olmesartan-hydrochlorothiazide (BENICAR HCT) 20-12.5 MG per tablet 14 tablet 0   • rosuvastatin (CRESTOR) 40 MG tablet 14 tablet 0        Pharmacy where request should be sent: [facility] [address] - [address] [facility] [address] AT [facility] - [phone]  - [phone] FX     Additional details provided by patient: PATIENT HAS BEEN OUT OF MEDICATION FOR 4 DAYS- PATIENT HAS MADE A APPOINTMENT FOR Friday     Does the patient have less than a 3 day supply:  [x] Yes  [] No    Ministerio Jesus Rep   01/05/22 11:42 EST

## 2022-01-17 DIAGNOSIS — I10 ESSENTIAL HYPERTENSION: ICD-10-CM

## 2022-01-17 DIAGNOSIS — E78.2 MIXED HYPERLIPIDEMIA: ICD-10-CM

## 2022-01-17 DIAGNOSIS — J45.20 MILD INTERMITTENT ASTHMA WITHOUT COMPLICATION: ICD-10-CM

## 2022-01-17 RX ORDER — CITALOPRAM 40 MG/1
TABLET ORAL
Qty: 14 TABLET | Refills: 0 | Status: SHIPPED | OUTPATIENT
Start: 2022-01-17 | End: 2022-01-26 | Stop reason: SDUPTHER

## 2022-01-17 RX ORDER — LEVOTHYROXINE SODIUM 0.12 MG/1
TABLET ORAL
Qty: 14 TABLET | Refills: 0 | Status: SHIPPED | OUTPATIENT
Start: 2022-01-17 | End: 2022-01-26 | Stop reason: SDUPTHER

## 2022-01-17 RX ORDER — ROSUVASTATIN CALCIUM 40 MG/1
TABLET, COATED ORAL
Qty: 14 TABLET | Refills: 0 | Status: SHIPPED | OUTPATIENT
Start: 2022-01-17 | End: 2022-01-26 | Stop reason: SDUPTHER

## 2022-01-17 RX ORDER — OLMESARTAN MEDOXOMIL AND HYDROCHLOROTHIAZIDE 20/12.5 20; 12.5 MG/1; MG/1
TABLET ORAL
Qty: 14 TABLET | Refills: 0 | Status: SHIPPED | OUTPATIENT
Start: 2022-01-17 | End: 2022-01-26 | Stop reason: SDUPTHER

## 2022-01-17 RX ORDER — MONTELUKAST SODIUM 10 MG/1
TABLET ORAL
Qty: 14 TABLET | Refills: 0 | Status: SHIPPED | OUTPATIENT
Start: 2022-01-17 | End: 2022-01-26 | Stop reason: SDUPTHER

## 2022-01-26 ENCOUNTER — TELEMEDICINE (OUTPATIENT)
Dept: FAMILY MEDICINE CLINIC | Facility: CLINIC | Age: 62
End: 2022-01-26

## 2022-01-26 DIAGNOSIS — J01.00 ACUTE NON-RECURRENT MAXILLARY SINUSITIS: ICD-10-CM

## 2022-01-26 DIAGNOSIS — E78.2 MIXED HYPERLIPIDEMIA: ICD-10-CM

## 2022-01-26 DIAGNOSIS — Z12.31 ENCOUNTER FOR SCREENING MAMMOGRAM FOR MALIGNANT NEOPLASM OF BREAST: ICD-10-CM

## 2022-01-26 DIAGNOSIS — I10 ESSENTIAL HYPERTENSION: Primary | ICD-10-CM

## 2022-01-26 DIAGNOSIS — J45.20 MILD INTERMITTENT ASTHMA WITHOUT COMPLICATION: ICD-10-CM

## 2022-01-26 DIAGNOSIS — E03.9 ACQUIRED HYPOTHYROIDISM: ICD-10-CM

## 2022-01-26 DIAGNOSIS — E55.9 VITAMIN D DEFICIENCY: ICD-10-CM

## 2022-01-26 PROCEDURE — 99214 OFFICE O/P EST MOD 30 MIN: CPT | Performed by: FAMILY MEDICINE

## 2022-01-26 RX ORDER — AMOXICILLIN AND CLAVULANATE POTASSIUM 875; 125 MG/1; MG/1
1 TABLET, FILM COATED ORAL 2 TIMES DAILY
Qty: 20 TABLET | Refills: 0 | Status: SHIPPED | OUTPATIENT
Start: 2022-01-26 | End: 2022-02-05

## 2022-01-26 RX ORDER — CITALOPRAM 40 MG/1
40 TABLET ORAL DAILY
Qty: 90 TABLET | Refills: 1 | Status: SHIPPED | OUTPATIENT
Start: 2022-01-26 | End: 2022-08-15

## 2022-01-26 RX ORDER — ALBUTEROL SULFATE 90 UG/1
2 AEROSOL, METERED RESPIRATORY (INHALATION) EVERY 4 HOURS PRN
Qty: 8.5 G | Refills: 5 | Status: SHIPPED | OUTPATIENT
Start: 2022-01-26 | End: 2022-08-23

## 2022-01-26 RX ORDER — ROSUVASTATIN CALCIUM 40 MG/1
40 TABLET, COATED ORAL DAILY
Qty: 90 TABLET | Refills: 1 | Status: SHIPPED | OUTPATIENT
Start: 2022-01-26 | End: 2022-08-15

## 2022-01-26 RX ORDER — MONTELUKAST SODIUM 10 MG/1
10 TABLET ORAL NIGHTLY
Qty: 90 TABLET | Refills: 1 | Status: SHIPPED | OUTPATIENT
Start: 2022-01-26 | End: 2022-02-07

## 2022-01-26 RX ORDER — FENOFIBRATE 48 MG/1
48 TABLET, COATED ORAL DAILY
Qty: 90 TABLET | Refills: 1 | Status: SHIPPED | OUTPATIENT
Start: 2022-01-26 | End: 2022-10-17 | Stop reason: SDUPTHER

## 2022-01-26 RX ORDER — LEVOTHYROXINE SODIUM 0.12 MG/1
125 TABLET ORAL EVERY MORNING
Qty: 90 TABLET | Refills: 1 | Status: SHIPPED | OUTPATIENT
Start: 2022-01-26 | End: 2022-08-15

## 2022-01-26 RX ORDER — OLMESARTAN MEDOXOMIL AND HYDROCHLOROTHIAZIDE 20/12.5 20; 12.5 MG/1; MG/1
1 TABLET ORAL DAILY
Qty: 90 TABLET | Refills: 1 | Status: SHIPPED | OUTPATIENT
Start: 2022-01-26 | End: 2022-08-15

## 2022-01-26 NOTE — PROGRESS NOTES
Chief Complaint  Follow-up and Sinusitis  You have chosen to receive care through a telehealth visit.  Do you consent to use a video/audio connection for your medical care today? Yes  Subjective          Florence Dee presents to [facility]  Hypertension  The current episode started more than 1 year ago. The problem is controlled. Associated symptoms include headaches. Risk factors for coronary artery disease include dyslipidemia. Past treatments include diuretics and angiotensin blockers. Current antihypertension treatment includes angiotensin blockers and diuretics. The current treatment provides significant improvement.   Sinusitis  This is a new problem. The current episode started 1 to 4 weeks ago. There has been no fever. Associated symptoms include congestion, ear pain, headaches and sinus pressure. Treatments tried: Mucinex. The treatment provided mild relief.   Hyperlipidemia  This is a chronic problem. The current episode started more than 1 year ago. Current antihyperlipidemic treatment includes statins and fibric acid derivatives. The current treatment provides significant improvement of lipids. Risk factors for coronary artery disease include dyslipidemia and hypertension.     Mood is doing well with Citalopram.     The following portions of the patient's history were reviewed and updated as appropriate: allergies, current medications, past family history, past medical history, past social history, past surgical history and problem list.    Objective   Vital Signs:   There were no vitals taken for this visit.    Physical Exam   Result Review :                 Assessment and Plan    Diagnoses and all orders for this visit:    1. Essential hypertension (Primary)  Comments:  She reports that home readings are controlled  Orders:  -     CBC & Differential; Future  -     Comprehensive Metabolic Panel; Future  -     Lipid Panel With / Chol / HDL Ratio; Future  -      olmesartan-hydrochlorothiazide (BENICAR HCT) 20-12.5 MG per tablet; Take 1 tablet by mouth Daily.  Dispense: 90 tablet; Refill: 1    2. Acquired hypothyroidism  -     CBC & Differential; Future  -     Comprehensive Metabolic Panel; Future  -     Lipid Panel With / Chol / HDL Ratio; Future  -     TSH+Free T4; Future  -     levothyroxine (SYNTHROID, LEVOTHROID) 125 MCG tablet; Take 1 tablet by mouth Every Morning.  Dispense: 90 tablet; Refill: 1    3. Mixed hyperlipidemia  -     CBC & Differential; Future  -     Comprehensive Metabolic Panel; Future  -     Lipid Panel With / Chol / HDL Ratio; Future  -     rosuvastatin (CRESTOR) 40 MG tablet; Take 1 tablet by mouth Daily.  Dispense: 90 tablet; Refill: 1  -     fenofibrate (Tricor) 48 MG tablet; Take 1 tablet by mouth Daily.  Dispense: 90 tablet; Refill: 1    4. Vitamin D deficiency  -     CBC & Differential; Future  -     Comprehensive Metabolic Panel; Future  -     Vitamin D 25 Hydroxy; Future    5. Mild intermittent asthma without complication  -     CBC & Differential; Future  -     Comprehensive Metabolic Panel; Future  -     albuterol sulfate  (90 Base) MCG/ACT inhaler; Inhale 2 puffs Every 4 (Four) Hours As Needed for Wheezing or Shortness of Air.  Dispense: 8.5 g; Refill: 5  -     montelukast (SINGULAIR) 10 MG tablet; Take 1 tablet by mouth Every Night.  Dispense: 90 tablet; Refill: 1    6. Acute non-recurrent maxillary sinusitis  Comments:  Augmentin to treat    7. Encounter for screening mammogram for malignant neoplasm of breast  -     Mammo Screening Digital Tomosynthesis Bilateral With CAD; Future    Other orders  -     citalopram (CeleXA) 40 MG tablet; Take 1 tablet by mouth Daily.  Dispense: 90 tablet; Refill: 1  -     amoxicillin-clavulanate (Augmentin) 875-125 MG per tablet; Take 1 tablet by mouth 2 (Two) Times a Day for 10 days.  Dispense: 20 tablet; Refill: 0    She will update fasting labs in near future.     I spent 25 minutes caring for  Florence on this date of service. This time includes time spent by me in the following activities:ordering medications, tests, or procedures and documenting information in the medical record  Follow Up   Return in about 6 months (around 7/26/2022) for Recheck.  Patient was given instructions and counseling regarding her condition or for health maintenance advice. Please see specific information pulled into the AVS if appropriate.

## 2022-02-06 DIAGNOSIS — J45.20 MILD INTERMITTENT ASTHMA WITHOUT COMPLICATION: ICD-10-CM

## 2022-02-07 RX ORDER — MONTELUKAST SODIUM 10 MG/1
TABLET ORAL
Qty: 90 TABLET | Refills: 0 | Status: SHIPPED | OUTPATIENT
Start: 2022-02-07 | End: 2022-10-17 | Stop reason: SDUPTHER

## 2022-02-08 ENCOUNTER — LAB (OUTPATIENT)
Dept: FAMILY MEDICINE CLINIC | Facility: CLINIC | Age: 62
End: 2022-02-08

## 2022-02-08 DIAGNOSIS — E78.2 MIXED HYPERLIPIDEMIA: ICD-10-CM

## 2022-02-08 DIAGNOSIS — E55.9 VITAMIN D DEFICIENCY: ICD-10-CM

## 2022-02-08 DIAGNOSIS — I10 ESSENTIAL HYPERTENSION: ICD-10-CM

## 2022-02-08 DIAGNOSIS — J45.20 MILD INTERMITTENT ASTHMA WITHOUT COMPLICATION: ICD-10-CM

## 2022-02-08 DIAGNOSIS — E03.9 ACQUIRED HYPOTHYROIDISM: ICD-10-CM

## 2022-02-09 LAB
25(OH)D3+25(OH)D2 SERPL-MCNC: 25.5 NG/ML (ref 30–100)
ALBUMIN SERPL-MCNC: 4.4 G/DL (ref 3.5–5.2)
ALBUMIN/GLOB SERPL: 2 G/DL
ALP SERPL-CCNC: 84 U/L (ref 39–117)
ALT SERPL-CCNC: 50 U/L (ref 1–33)
AST SERPL-CCNC: 40 U/L (ref 1–32)
BASOPHILS # BLD AUTO: 0.03 10*3/MM3 (ref 0–0.2)
BASOPHILS NFR BLD AUTO: 0.5 % (ref 0–1.5)
BILIRUB SERPL-MCNC: 0.8 MG/DL (ref 0–1.2)
BUN SERPL-MCNC: 14 MG/DL (ref 6–20)
BUN/CREAT SERPL: 17.9 (ref 7–25)
CALCIUM SERPL-MCNC: 9.7 MG/DL (ref 8.6–10.5)
CHLORIDE SERPL-SCNC: 101 MMOL/L (ref 98–107)
CHOLEST SERPL-MCNC: 194 MG/DL (ref 0–200)
CHOLEST/HDLC SERPL: 2.28 {RATIO}
CO2 SERPL-SCNC: 27 MMOL/L (ref 22–29)
CREAT SERPL-MCNC: 0.78 MG/DL (ref 0.57–1)
EOSINOPHIL # BLD AUTO: 0.23 10*3/MM3 (ref 0–0.4)
EOSINOPHIL NFR BLD AUTO: 4 % (ref 0.3–6.2)
ERYTHROCYTE [DISTWIDTH] IN BLOOD BY AUTOMATED COUNT: 14 % (ref 12.3–15.4)
GLOBULIN SER CALC-MCNC: 2.2 GM/DL
GLUCOSE SERPL-MCNC: 95 MG/DL (ref 65–99)
HCT VFR BLD AUTO: 45.2 % (ref 34–46.6)
HDLC SERPL-MCNC: 85 MG/DL (ref 40–60)
HGB BLD-MCNC: 15.4 G/DL (ref 12–15.9)
IMM GRANULOCYTES # BLD AUTO: 0.01 10*3/MM3 (ref 0–0.05)
IMM GRANULOCYTES NFR BLD AUTO: 0.2 % (ref 0–0.5)
LDLC SERPL CALC-MCNC: 90 MG/DL (ref 0–100)
LYMPHOCYTES # BLD AUTO: 1.74 10*3/MM3 (ref 0.7–3.1)
LYMPHOCYTES NFR BLD AUTO: 30.4 % (ref 19.6–45.3)
MCH RBC QN AUTO: 33.9 PG (ref 26.6–33)
MCHC RBC AUTO-ENTMCNC: 34.1 G/DL (ref 31.5–35.7)
MCV RBC AUTO: 99.6 FL (ref 79–97)
MONOCYTES # BLD AUTO: 0.44 10*3/MM3 (ref 0.1–0.9)
MONOCYTES NFR BLD AUTO: 7.7 % (ref 5–12)
NEUTROPHILS # BLD AUTO: 3.28 10*3/MM3 (ref 1.7–7)
NEUTROPHILS NFR BLD AUTO: 57.2 % (ref 42.7–76)
NRBC BLD AUTO-RTO: 0 /100 WBC (ref 0–0.2)
PLATELET # BLD AUTO: 266 10*3/MM3 (ref 140–450)
POTASSIUM SERPL-SCNC: 4.6 MMOL/L (ref 3.5–5.2)
PROT SERPL-MCNC: 6.6 G/DL (ref 6–8.5)
RBC # BLD AUTO: 4.54 10*6/MM3 (ref 3.77–5.28)
SODIUM SERPL-SCNC: 139 MMOL/L (ref 136–145)
T4 FREE SERPL-MCNC: 1.51 NG/DL (ref 0.93–1.7)
TRIGL SERPL-MCNC: 109 MG/DL (ref 0–150)
TSH SERPL DL<=0.005 MIU/L-ACNC: 3.3 UIU/ML (ref 0.27–4.2)
VLDLC SERPL CALC-MCNC: 19 MG/DL (ref 5–40)
WBC # BLD AUTO: 5.73 10*3/MM3 (ref 3.4–10.8)

## 2022-02-11 ENCOUNTER — TELEPHONE (OUTPATIENT)
Dept: FAMILY MEDICINE CLINIC | Facility: CLINIC | Age: 62
End: 2022-02-11

## 2022-02-11 NOTE — TELEPHONE ENCOUNTER
Caller: Florence Dee    Relationship: Self    Best call back number: [phone]    Caller requesting test results: PATIENT     What test was performed: BLOOD WORK     When was the test performed: 2/8/22    Where was the test performed: IN OFFICE     Additional notes: PATIENT STATES SHE HAS REVIEWED TEST RESULTS ON [facility] AND NOTICED HER VITAMIN D AND B12 WAS LOW AND WOULD LIKE TO KNOW IF SHE WILL NEED TO TAKE A SUPPLEMENT FOR THE VITAMIN D AND B12. PATIENT WOULD ALSO LIKE TO KNOW IF SHE WILL BE RECEIVING A LETTER IN THE MAIL WITH RESULTS.

## 2022-08-05 ENCOUNTER — TELEPHONE (OUTPATIENT)
Dept: FAMILY MEDICINE CLINIC | Facility: CLINIC | Age: 62
End: 2022-08-05

## 2022-08-05 NOTE — TELEPHONE ENCOUNTER
Caller: Florence Dee    Relationship to patient: Self    Best call back number: [phone]    Date of positive COVID19 test: 08/04/22     COVID19 symptoms: HEADACHE, FEVER, COUGH    Date of initial quarantine: NA    Additional information or concerns: PATIENT IS WANTING A MEDICATION CALLED IN    What is the patients preferred pharmacy:   [facility] [address] AT [facility] - [phone] [facility] [phone]   [phone]  Associate Signed OrdersPatient EstimateProvidersCurrent Interactions

## 2022-08-14 DIAGNOSIS — E03.9 ACQUIRED HYPOTHYROIDISM: ICD-10-CM

## 2022-08-14 DIAGNOSIS — I10 ESSENTIAL HYPERTENSION: ICD-10-CM

## 2022-08-14 DIAGNOSIS — E78.2 MIXED HYPERLIPIDEMIA: ICD-10-CM

## 2022-08-15 RX ORDER — ROSUVASTATIN CALCIUM 40 MG/1
TABLET, COATED ORAL
Qty: 30 TABLET | Refills: 0 | Status: SHIPPED | OUTPATIENT
Start: 2022-08-15 | End: 2022-09-16

## 2022-08-15 RX ORDER — LEVOTHYROXINE SODIUM 0.12 MG/1
TABLET ORAL
Qty: 30 TABLET | Refills: 0 | Status: SHIPPED | OUTPATIENT
Start: 2022-08-15 | End: 2022-09-16

## 2022-08-15 RX ORDER — CITALOPRAM 40 MG/1
TABLET ORAL
Qty: 30 TABLET | Refills: 0 | Status: SHIPPED | OUTPATIENT
Start: 2022-08-15 | End: 2022-09-16

## 2022-08-15 RX ORDER — OLMESARTAN MEDOXOMIL AND HYDROCHLOROTHIAZIDE 20/12.5 20; 12.5 MG/1; MG/1
TABLET ORAL
Qty: 30 TABLET | Refills: 0 | Status: SHIPPED | OUTPATIENT
Start: 2022-08-15 | End: 2022-09-16

## 2022-08-20 DIAGNOSIS — J45.20 MILD INTERMITTENT ASTHMA WITHOUT COMPLICATION: ICD-10-CM

## 2022-08-23 RX ORDER — ALBUTEROL SULFATE 90 UG/1
AEROSOL, METERED RESPIRATORY (INHALATION)
Qty: 8.5 G | Refills: 5 | Status: SHIPPED | OUTPATIENT
Start: 2022-08-23 | End: 2022-10-17 | Stop reason: SDUPTHER

## 2022-09-16 DIAGNOSIS — E03.9 ACQUIRED HYPOTHYROIDISM: ICD-10-CM

## 2022-09-16 DIAGNOSIS — I10 ESSENTIAL HYPERTENSION: ICD-10-CM

## 2022-09-16 DIAGNOSIS — E78.2 MIXED HYPERLIPIDEMIA: ICD-10-CM

## 2022-09-16 RX ORDER — CITALOPRAM 40 MG/1
TABLET ORAL
Qty: 30 TABLET | Refills: 0 | Status: SHIPPED | OUTPATIENT
Start: 2022-09-16 | End: 2022-10-17 | Stop reason: SDUPTHER

## 2022-09-16 RX ORDER — LEVOTHYROXINE SODIUM 0.12 MG/1
TABLET ORAL
Qty: 30 TABLET | Refills: 0 | Status: SHIPPED | OUTPATIENT
Start: 2022-09-16 | End: 2022-10-17 | Stop reason: SDUPTHER

## 2022-09-16 RX ORDER — ROSUVASTATIN CALCIUM 40 MG/1
TABLET, COATED ORAL
Qty: 30 TABLET | Refills: 0 | Status: SHIPPED | OUTPATIENT
Start: 2022-09-16 | End: 2022-10-17 | Stop reason: SDUPTHER

## 2022-09-16 RX ORDER — OLMESARTAN MEDOXOMIL AND HYDROCHLOROTHIAZIDE 20/12.5 20; 12.5 MG/1; MG/1
TABLET ORAL
Qty: 30 TABLET | Refills: 0 | Status: SHIPPED | OUTPATIENT
Start: 2022-09-16 | End: 2022-10-17 | Stop reason: SDUPTHER

## 2022-10-17 ENCOUNTER — OFFICE VISIT (OUTPATIENT)
Dept: FAMILY MEDICINE CLINIC | Facility: CLINIC | Age: 62
End: 2022-10-17

## 2022-10-17 VITALS
HEART RATE: 82 BPM | SYSTOLIC BLOOD PRESSURE: 104 MMHG | TEMPERATURE: 97.3 F | BODY MASS INDEX: 22.88 KG/M2 | OXYGEN SATURATION: 96 % | RESPIRATION RATE: 14 BRPM | HEIGHT: 64 IN | DIASTOLIC BLOOD PRESSURE: 80 MMHG | WEIGHT: 134 LBS

## 2022-10-17 DIAGNOSIS — I10 PRIMARY HYPERTENSION: ICD-10-CM

## 2022-10-17 DIAGNOSIS — Z12.31 ENCOUNTER FOR SCREENING MAMMOGRAM FOR MALIGNANT NEOPLASM OF BREAST: ICD-10-CM

## 2022-10-17 DIAGNOSIS — J45.20 MILD INTERMITTENT ASTHMA WITHOUT COMPLICATION: ICD-10-CM

## 2022-10-17 DIAGNOSIS — E78.2 MIXED HYPERLIPIDEMIA: ICD-10-CM

## 2022-10-17 DIAGNOSIS — E55.9 VITAMIN D DEFICIENCY: ICD-10-CM

## 2022-10-17 DIAGNOSIS — E03.9 ACQUIRED HYPOTHYROIDISM: ICD-10-CM

## 2022-10-17 DIAGNOSIS — Z23 NEED FOR PNEUMOCOCCAL VACCINATION: ICD-10-CM

## 2022-10-17 DIAGNOSIS — Z00.00 ANNUAL PHYSICAL EXAM: Primary | ICD-10-CM

## 2022-10-17 PROCEDURE — 90677 PCV20 VACCINE IM: CPT | Performed by: FAMILY MEDICINE

## 2022-10-17 PROCEDURE — 99396 PREV VISIT EST AGE 40-64: CPT | Performed by: FAMILY MEDICINE

## 2022-10-17 PROCEDURE — 90471 IMMUNIZATION ADMIN: CPT | Performed by: FAMILY MEDICINE

## 2022-10-17 RX ORDER — OLMESARTAN MEDOXOMIL AND HYDROCHLOROTHIAZIDE 20/12.5 20; 12.5 MG/1; MG/1
1 TABLET ORAL DAILY
Qty: 90 TABLET | Refills: 3 | Status: SHIPPED | OUTPATIENT
Start: 2022-10-17

## 2022-10-17 RX ORDER — CITALOPRAM 40 MG/1
40 TABLET ORAL DAILY
Qty: 90 TABLET | Refills: 3 | Status: SHIPPED | OUTPATIENT
Start: 2022-10-17

## 2022-10-17 RX ORDER — FENOFIBRATE 48 MG/1
48 TABLET, COATED ORAL DAILY
Qty: 90 TABLET | Refills: 3 | Status: SHIPPED | OUTPATIENT
Start: 2022-10-17

## 2022-10-17 RX ORDER — MONTELUKAST SODIUM 10 MG/1
10 TABLET ORAL NIGHTLY
Qty: 90 TABLET | Refills: 3 | Status: SHIPPED | OUTPATIENT
Start: 2022-10-17

## 2022-10-17 RX ORDER — ALBUTEROL SULFATE 90 UG/1
2 AEROSOL, METERED RESPIRATORY (INHALATION) EVERY 4 HOURS PRN
Qty: 8.5 G | Refills: 5 | Status: SHIPPED | OUTPATIENT
Start: 2022-10-17

## 2022-10-17 RX ORDER — ROSUVASTATIN CALCIUM 40 MG/1
40 TABLET, COATED ORAL DAILY
Qty: 90 TABLET | Refills: 3 | Status: SHIPPED | OUTPATIENT
Start: 2022-10-17

## 2022-10-17 RX ORDER — LEVOTHYROXINE SODIUM 0.12 MG/1
125 TABLET ORAL EVERY MORNING
Qty: 90 TABLET | Refills: 3 | Status: SHIPPED | OUTPATIENT
Start: 2022-10-17 | End: 2022-10-20 | Stop reason: SDUPTHER

## 2022-10-17 NOTE — PROGRESS NOTES
Chief Complaint  Annual Exam    Subjective          Florence Dee presents to [facility]  History of Present Illness  Here for annual exam  Mammogram: overdue  Dental exam: up to date  Vision exam: Jan 2022  Colonoscopy: Sept 2020, repeat in 10 years  Pap smear: Oct 2020  Former tobacco user, quit in 2019. Smoked on and off x 25 years total, 0.5 ppd    Mood is doing good with citalopram.     The following portions of the patient's history were reviewed and updated as appropriate: allergies, current medications, past family history, past medical history, past social history, past surgical history and problem list.    Objective      Physical Exam  Vitals and nursing note reviewed.   Constitutional:       Appearance: Normal appearance. She is well-developed.   HENT:      Head: Normocephalic and atraumatic.      Right Ear: Tympanic membrane, ear canal and external ear normal.      Left Ear: Tympanic membrane, ear canal and external ear normal.      Nose: Nose normal.      Mouth/Throat:      Mouth: Mucous membranes are moist.      Pharynx: No oropharyngeal exudate or posterior oropharyngeal erythema.   Eyes:      Conjunctiva/sclera: Conjunctivae normal.   Cardiovascular:      Rate and Rhythm: Normal rate and regular rhythm.      Heart sounds: Normal heart sounds. No murmur heard.  Pulmonary:      Effort: Pulmonary effort is normal.      Breath sounds: Normal breath sounds. No wheezing.   Musculoskeletal:         General: No swelling.      Cervical back: Neck supple.   Skin:     General: Skin is warm.   Neurological:      Mental Status: She is alert and oriented to person, place, and time.   Psychiatric:         Mood and Affect: Mood normal.         Behavior: Behavior normal.        Result Review :                Assessment and Plan    Diagnoses and all orders for this visit:    1. Annual physical exam (Primary)  -     CBC & Differential  -     Comprehensive Metabolic Panel    2.  Primary hypertension  -     CBC & Differential  -     Comprehensive Metabolic Panel  -     Lipid Panel With / Chol / HDL Ratio  -     olmesartan-hydrochlorothiazide (BENICAR HCT) 20-12.5 MG per tablet; Take 1 tablet by mouth Daily.  Dispense: 90 tablet; Refill: 3    3. Acquired hypothyroidism  -     CBC & Differential  -     Comprehensive Metabolic Panel  -     TSH  -     levothyroxine (SYNTHROID, LEVOTHROID) 125 MCG tablet; Take 1 tablet by mouth Every Morning.  Dispense: 90 tablet; Refill: 3    4. Mixed hyperlipidemia  -     fenofibrate (Tricor) 48 MG tablet; Take 1 tablet by mouth Daily.  Dispense: 90 tablet; Refill: 3  -     rosuvastatin (CRESTOR) 40 MG tablet; Take 1 tablet by mouth Daily.  Dispense: 90 tablet; Refill: 3    5. Vitamin D deficiency  -     CBC & Differential  -     Comprehensive Metabolic Panel  -     Vitamin D 25 Hydroxy    6. Mild intermittent asthma without complication  -     montelukast (SINGULAIR) 10 MG tablet; Take 1 tablet by mouth Every Night.  Dispense: 90 tablet; Refill: 3  -     albuterol sulfate  (90 Base) MCG/ACT inhaler; Inhale 2 puffs Every 4 (Four) Hours As Needed for Wheezing or Shortness of Air.  Dispense: 8.5 g; Refill: 5    7. Encounter for screening mammogram for malignant neoplasm of breast  -     CBC & Differential  -     Comprehensive Metabolic Panel  -     Mammo Screening Digital Tomosynthesis Bilateral With CAD; Future    8. Need for pneumococcal vaccination  -     Pneumococcal Conjugate Vaccine 20-Valent (PCV20)    Other orders  -     citalopram (CeleXA) 40 MG tablet; Take 1 tablet by mouth Daily.  Dispense: 90 tablet; Refill: 3    Health advice: healthy food choices with fresh fruits and vegetables, maintain sleep pattern at least 8 hours, avoid texting and distracted driving practices; wear safety belt, engage in regular exercise, maintain healthy weight, use safe sex practices, avoid alcohol and illicit drugs. Maintain immunizations that are up to date.  Maintain health maintenance: Mammogram, Pap, etc.  Follow up with PCP if struggling with depression or anxiety. Keep regular dental and eye exams. Brush and floss teeth daily.   F/U annually and prn.         Follow Up   Return in about 1 year (around 10/17/2023) for Annual.  Patient was given instructions and counseling regarding her condition or for health maintenance advice. Please see specific information pulled into the AVS if appropriate.

## 2022-10-18 LAB
25(OH)D3+25(OH)D2 SERPL-MCNC: 36.6 NG/ML (ref 30–100)
ALBUMIN SERPL-MCNC: 4.3 G/DL (ref 3.5–5.2)
ALBUMIN/GLOB SERPL: 2 G/DL
ALP SERPL-CCNC: 75 U/L (ref 39–117)
ALT SERPL-CCNC: 105 U/L (ref 1–33)
AST SERPL-CCNC: 78 U/L (ref 1–32)
BASOPHILS # BLD AUTO: 0.05 10*3/MM3 (ref 0–0.2)
BASOPHILS NFR BLD AUTO: 0.8 % (ref 0–1.5)
BILIRUB SERPL-MCNC: 0.6 MG/DL (ref 0–1.2)
BUN SERPL-MCNC: 11 MG/DL (ref 8–23)
BUN/CREAT SERPL: 14.9 (ref 7–25)
CALCIUM SERPL-MCNC: 9.5 MG/DL (ref 8.6–10.5)
CHLORIDE SERPL-SCNC: 100 MMOL/L (ref 98–107)
CHOLEST SERPL-MCNC: 224 MG/DL (ref 0–200)
CHOLEST/HDLC SERPL: 3.15 {RATIO}
CO2 SERPL-SCNC: 28.4 MMOL/L (ref 22–29)
CREAT SERPL-MCNC: 0.74 MG/DL (ref 0.57–1)
EGFRCR SERPLBLD CKD-EPI 2021: 92.8 ML/MIN/1.73
EOSINOPHIL # BLD AUTO: 0.28 10*3/MM3 (ref 0–0.4)
EOSINOPHIL NFR BLD AUTO: 4.7 % (ref 0.3–6.2)
ERYTHROCYTE [DISTWIDTH] IN BLOOD BY AUTOMATED COUNT: 12.7 % (ref 12.3–15.4)
GLOBULIN SER CALC-MCNC: 2.1 GM/DL
GLUCOSE SERPL-MCNC: 75 MG/DL (ref 65–99)
HCT VFR BLD AUTO: 45.7 % (ref 34–46.6)
HDLC SERPL-MCNC: 71 MG/DL (ref 40–60)
HGB BLD-MCNC: 15.4 G/DL (ref 12–15.9)
IMM GRANULOCYTES # BLD AUTO: 0.01 10*3/MM3 (ref 0–0.05)
IMM GRANULOCYTES NFR BLD AUTO: 0.2 % (ref 0–0.5)
LDLC SERPL CALC-MCNC: 128 MG/DL (ref 0–100)
LYMPHOCYTES # BLD AUTO: 1.77 10*3/MM3 (ref 0.7–3.1)
LYMPHOCYTES NFR BLD AUTO: 29.5 % (ref 19.6–45.3)
MCH RBC QN AUTO: 33.8 PG (ref 26.6–33)
MCHC RBC AUTO-ENTMCNC: 33.7 G/DL (ref 31.5–35.7)
MCV RBC AUTO: 100.2 FL (ref 79–97)
MONOCYTES # BLD AUTO: 0.41 10*3/MM3 (ref 0.1–0.9)
MONOCYTES NFR BLD AUTO: 6.8 % (ref 5–12)
NEUTROPHILS # BLD AUTO: 3.49 10*3/MM3 (ref 1.7–7)
NEUTROPHILS NFR BLD AUTO: 58 % (ref 42.7–76)
NRBC BLD AUTO-RTO: 0 /100 WBC (ref 0–0.2)
PLATELET # BLD AUTO: 306 10*3/MM3 (ref 140–450)
POTASSIUM SERPL-SCNC: 4.7 MMOL/L (ref 3.5–5.2)
PROT SERPL-MCNC: 6.4 G/DL (ref 6–8.5)
RBC # BLD AUTO: 4.56 10*6/MM3 (ref 3.77–5.28)
SODIUM SERPL-SCNC: 141 MMOL/L (ref 136–145)
TRIGL SERPL-MCNC: 145 MG/DL (ref 0–150)
TSH SERPL DL<=0.005 MIU/L-ACNC: 0.14 UIU/ML (ref 0.27–4.2)
VLDLC SERPL CALC-MCNC: 25 MG/DL (ref 5–40)
WBC # BLD AUTO: 6.01 10*3/MM3 (ref 3.4–10.8)

## 2022-10-20 DIAGNOSIS — E03.9 ACQUIRED HYPOTHYROIDISM: ICD-10-CM

## 2022-10-20 DIAGNOSIS — R74.8 ELEVATED LIVER ENZYMES: Primary | ICD-10-CM

## 2022-10-20 DIAGNOSIS — E78.2 MIXED HYPERLIPIDEMIA: ICD-10-CM

## 2022-10-20 DIAGNOSIS — I10 PRIMARY HYPERTENSION: ICD-10-CM

## 2022-10-20 RX ORDER — ROSUVASTATIN CALCIUM 40 MG/1
TABLET, COATED ORAL
Qty: 30 TABLET | OUTPATIENT
Start: 2022-10-20

## 2022-10-20 RX ORDER — CITALOPRAM 40 MG/1
TABLET ORAL
Qty: 30 TABLET | OUTPATIENT
Start: 2022-10-20

## 2022-10-20 RX ORDER — LEVOTHYROXINE SODIUM 0.12 MG/1
TABLET ORAL
Qty: 30 TABLET | OUTPATIENT
Start: 2022-10-20

## 2022-10-20 RX ORDER — LEVOTHYROXINE SODIUM 0.1 MG/1
100 TABLET ORAL EVERY MORNING
Qty: 90 TABLET | Refills: 0 | Status: SHIPPED | OUTPATIENT
Start: 2022-10-20 | End: 2023-01-30

## 2022-10-20 RX ORDER — OLMESARTAN MEDOXOMIL AND HYDROCHLOROTHIAZIDE 20/12.5 20; 12.5 MG/1; MG/1
TABLET ORAL
Qty: 30 TABLET | OUTPATIENT
Start: 2022-10-20

## 2022-10-21 ENCOUNTER — TELEPHONE (OUTPATIENT)
Dept: FAMILY MEDICINE CLINIC | Facility: CLINIC | Age: 62
End: 2022-10-21

## 2022-10-21 LAB
Lab: NORMAL
SPECIMEN STATUS: NORMAL
WRITTEN AUTHORIZATION: NORMAL

## 2022-10-21 NOTE — TELEPHONE ENCOUNTER
Caller: Florence Dee    Relationship: Self    Best call back number: [phone]    What was the call regarding: PATIENT CALLED WITH A QUESTIONS ABOUT HER SYNTHROID DOSAGE IS BEING LOWERED.  PATIENT STATED THAT ON HER RESULTS SHE SEEN THAT HER LEVELS WERE LOW.  PATIENT WOULD LIKE A CALL BACK TO DISCUSS.     Do you require a callback:YES

## 2022-10-23 DIAGNOSIS — R74.8 ELEVATED LIVER ENZYMES: Primary | ICD-10-CM

## 2022-10-23 DIAGNOSIS — E03.9 ACQUIRED HYPOTHYROIDISM: ICD-10-CM

## 2022-12-12 ENCOUNTER — HOSPITAL ENCOUNTER (OUTPATIENT)
Dept: MAMMOGRAPHY | Facility: HOSPITAL | Age: 62
Discharge: HOME | End: 2022-12-12
Admitting: FAMILY MEDICINE

## 2022-12-12 DIAGNOSIS — Z12.31 ENCOUNTER FOR SCREENING MAMMOGRAM FOR MALIGNANT NEOPLASM OF BREAST: ICD-10-CM

## 2022-12-12 PROCEDURE — 77067 SCR MAMMO BI INCL CAD: CPT

## 2022-12-12 PROCEDURE — 77063 BREAST TOMOSYNTHESIS BI: CPT | Performed by: RADIOLOGY

## 2022-12-12 PROCEDURE — 77063 BREAST TOMOSYNTHESIS BI: CPT

## 2022-12-12 PROCEDURE — 77067 SCR MAMMO BI INCL CAD: CPT | Performed by: RADIOLOGY

## 2023-01-24 ENCOUNTER — HOSPITAL ENCOUNTER (OUTPATIENT)
Dept: MAMMOGRAPHY | Facility: HOSPITAL | Age: 63
Discharge: HOME | End: 2023-01-24
Payer: COMMERCIAL

## 2023-01-24 ENCOUNTER — HOSPITAL ENCOUNTER (OUTPATIENT)
Dept: ULTRASOUND IMAGING | Facility: HOSPITAL | Age: 63
Discharge: HOME | End: 2023-01-24
Payer: COMMERCIAL

## 2023-01-24 DIAGNOSIS — R92.8 ABNORMAL MAMMOGRAM: ICD-10-CM

## 2023-01-24 PROCEDURE — 77066 DX MAMMO INCL CAD BI: CPT

## 2023-01-24 PROCEDURE — 76642 ULTRASOUND BREAST LIMITED: CPT | Performed by: RADIOLOGY

## 2023-01-24 PROCEDURE — 76642 ULTRASOUND BREAST LIMITED: CPT

## 2023-01-24 PROCEDURE — 77066 DX MAMMO INCL CAD BI: CPT | Performed by: RADIOLOGY

## 2023-01-28 DIAGNOSIS — E03.9 ACQUIRED HYPOTHYROIDISM: ICD-10-CM

## 2023-01-30 RX ORDER — LEVOTHYROXINE SODIUM 0.1 MG/1
100 TABLET ORAL EVERY MORNING
Qty: 30 TABLET | Refills: 0 | Status: SHIPPED | OUTPATIENT
Start: 2023-01-30 | End: 2023-03-01

## 2023-03-01 DIAGNOSIS — E03.9 ACQUIRED HYPOTHYROIDISM: ICD-10-CM

## 2023-03-01 RX ORDER — LEVOTHYROXINE SODIUM 0.1 MG/1
TABLET ORAL
Qty: 30 TABLET | Refills: 0 | Status: SHIPPED | OUTPATIENT
Start: 2023-03-01 | End: 2023-04-03

## 2023-03-01 NOTE — TELEPHONE ENCOUNTER
Pt was notified that she needed pending labs completed for medication rf. Pt aware and understood, stated she will try to come in 3/2/23.

## 2023-03-02 ENCOUNTER — LAB (OUTPATIENT)
Dept: FAMILY MEDICINE CLINIC | Facility: CLINIC | Age: 63
End: 2023-03-02
Payer: COMMERCIAL

## 2023-03-02 DIAGNOSIS — E03.9 ACQUIRED HYPOTHYROIDISM: ICD-10-CM

## 2023-03-02 DIAGNOSIS — R74.8 ELEVATED LIVER ENZYMES: ICD-10-CM

## 2023-03-03 LAB
ALBUMIN SERPL-MCNC: 4.7 G/DL (ref 3.8–4.9)
ALP SERPL-CCNC: 68 IU/L (ref 44–121)
ALT SERPL-CCNC: 37 IU/L (ref 0–32)
AST SERPL-CCNC: 31 IU/L (ref 0–40)
BILIRUB DIRECT SERPL-MCNC: 0.18 MG/DL (ref 0–0.4)
BILIRUB SERPL-MCNC: 0.6 MG/DL (ref 0–1.2)
HAV IGM SERPL QL IA: NEGATIVE
HBV CORE IGM SERPL QL IA: NEGATIVE
HBV SURFACE AG SERPL QL IA: NEGATIVE
HCV AB SERPL QL IA: NORMAL
HCV IGG SERPL QL IA: NON REACTIVE
PROT SERPL-MCNC: 6.7 G/DL (ref 6–8.5)
T4 FREE SERPL-MCNC: 1.44 NG/DL (ref 0.82–1.77)
TSH SERPL DL<=0.005 MIU/L-ACNC: 1.87 UIU/ML (ref 0.45–4.5)

## 2023-04-02 DIAGNOSIS — E03.9 ACQUIRED HYPOTHYROIDISM: ICD-10-CM

## 2023-04-03 RX ORDER — LEVOTHYROXINE SODIUM 0.1 MG/1
TABLET ORAL
Qty: 30 TABLET | Refills: 0 | Status: SHIPPED | OUTPATIENT
Start: 2023-04-03

## 2023-05-04 DIAGNOSIS — E03.9 ACQUIRED HYPOTHYROIDISM: ICD-10-CM

## 2023-05-04 RX ORDER — LEVOTHYROXINE SODIUM 0.1 MG/1
TABLET ORAL
Qty: 30 TABLET | Refills: 0 | Status: SHIPPED | OUTPATIENT
Start: 2023-05-04

## 2023-06-03 DIAGNOSIS — E03.9 ACQUIRED HYPOTHYROIDISM: ICD-10-CM

## 2023-06-05 RX ORDER — LEVOTHYROXINE SODIUM 0.1 MG/1
TABLET ORAL
Qty: 30 TABLET | Refills: 0 | Status: SHIPPED | OUTPATIENT
Start: 2023-06-05 | End: 2023-06-07 | Stop reason: SDUPTHER

## 2023-06-07 ENCOUNTER — TELEPHONE (OUTPATIENT)
Dept: FAMILY MEDICINE CLINIC | Facility: CLINIC | Age: 63
End: 2023-06-07
Payer: COMMERCIAL

## 2023-06-07 DIAGNOSIS — I10 PRIMARY HYPERTENSION: ICD-10-CM

## 2023-06-07 DIAGNOSIS — E03.9 ACQUIRED HYPOTHYROIDISM: ICD-10-CM

## 2023-06-07 DIAGNOSIS — E78.2 MIXED HYPERLIPIDEMIA: ICD-10-CM

## 2023-06-07 DIAGNOSIS — J45.20 MILD INTERMITTENT ASTHMA WITHOUT COMPLICATION: ICD-10-CM

## 2023-06-07 RX ORDER — CITALOPRAM 40 MG/1
40 TABLET ORAL DAILY
Qty: 90 TABLET | Refills: 3 | Status: SHIPPED | OUTPATIENT
Start: 2023-06-07

## 2023-06-07 RX ORDER — LEVOTHYROXINE SODIUM 0.1 MG/1
100 TABLET ORAL EVERY MORNING
Qty: 30 TABLET | Refills: 0 | Status: SHIPPED | OUTPATIENT
Start: 2023-06-07

## 2023-06-07 RX ORDER — MONTELUKAST SODIUM 10 MG/1
10 TABLET ORAL NIGHTLY
Qty: 90 TABLET | Refills: 3 | Status: SHIPPED | OUTPATIENT
Start: 2023-06-07

## 2023-06-07 RX ORDER — OLMESARTAN MEDOXOMIL AND HYDROCHLOROTHIAZIDE 20/12.5 20; 12.5 MG/1; MG/1
1 TABLET ORAL DAILY
Qty: 90 TABLET | Refills: 3 | Status: SHIPPED | OUTPATIENT
Start: 2023-06-07

## 2023-06-07 RX ORDER — ROSUVASTATIN CALCIUM 40 MG/1
40 TABLET, COATED ORAL DAILY
Qty: 90 TABLET | Refills: 3 | Status: SHIPPED | OUTPATIENT
Start: 2023-06-07

## 2023-06-07 NOTE — TELEPHONE ENCOUNTER
Received [facility] prescription request to transfer medications. Called patient to confirm that she wanted to switch?    FRONT OR HUB CAN READ

## 2023-09-04 DIAGNOSIS — J45.20 MILD INTERMITTENT ASTHMA WITHOUT COMPLICATION: ICD-10-CM

## 2023-09-07 RX ORDER — ALBUTEROL SULFATE 90 UG/1
AEROSOL, METERED RESPIRATORY (INHALATION)
Qty: 8.5 G | Refills: 5 | Status: SHIPPED | OUTPATIENT
Start: 2023-09-07

## 2024-01-02 DIAGNOSIS — J45.20 MILD INTERMITTENT ASTHMA WITHOUT COMPLICATION: ICD-10-CM

## 2024-01-03 RX ORDER — ALBUTEROL SULFATE 90 UG/1
2 AEROSOL, METERED RESPIRATORY (INHALATION) EVERY 4 HOURS PRN
Qty: 8.5 G | Refills: 5 | Status: SHIPPED | OUTPATIENT
Start: 2024-01-03

## 2024-01-04 ENCOUNTER — OFFICE VISIT (OUTPATIENT)
Dept: FAMILY MEDICINE CLINIC | Facility: CLINIC | Age: 64
End: 2024-01-04
Payer: COMMERCIAL

## 2024-01-04 VITALS
OXYGEN SATURATION: 94 % | DIASTOLIC BLOOD PRESSURE: 74 MMHG | WEIGHT: 136.6 LBS | TEMPERATURE: 97.8 F | HEIGHT: 64 IN | SYSTOLIC BLOOD PRESSURE: 120 MMHG | RESPIRATION RATE: 20 BRPM | HEART RATE: 84 BPM | BODY MASS INDEX: 23.32 KG/M2

## 2024-01-04 DIAGNOSIS — H66.001 NON-RECURRENT ACUTE SUPPURATIVE OTITIS MEDIA OF RIGHT EAR WITHOUT SPONTANEOUS RUPTURE OF TYMPANIC MEMBRANE: ICD-10-CM

## 2024-01-04 DIAGNOSIS — J01.00 ACUTE NON-RECURRENT MAXILLARY SINUSITIS: ICD-10-CM

## 2024-01-04 DIAGNOSIS — I10 PRIMARY HYPERTENSION: ICD-10-CM

## 2024-01-04 DIAGNOSIS — Z12.4 PAP SMEAR FOR CERVICAL CANCER SCREENING: ICD-10-CM

## 2024-01-04 DIAGNOSIS — E55.9 VITAMIN D DEFICIENCY: ICD-10-CM

## 2024-01-04 DIAGNOSIS — Z12.31 ENCOUNTER FOR SCREENING MAMMOGRAM FOR MALIGNANT NEOPLASM OF BREAST: ICD-10-CM

## 2024-01-04 DIAGNOSIS — E03.9 ACQUIRED HYPOTHYROIDISM: ICD-10-CM

## 2024-01-04 DIAGNOSIS — E78.2 MIXED HYPERLIPIDEMIA: ICD-10-CM

## 2024-01-04 DIAGNOSIS — Z00.00 ANNUAL PHYSICAL EXAM: Primary | ICD-10-CM

## 2024-01-04 RX ORDER — AMOXICILLIN AND CLAVULANATE POTASSIUM 875; 125 MG/1; MG/1
1 TABLET, FILM COATED ORAL 2 TIMES DAILY
Qty: 20 TABLET | Refills: 0 | Status: SHIPPED | OUTPATIENT
Start: 2024-01-04

## 2024-01-04 NOTE — PROGRESS NOTES
Chief Complaint  Annual Exam (Pt is fasting.)    Subjective          Florence Dee presents to [facility]  History of Present Illness    The patient is a 63-year-old female who presents for an annual physical examination.    She believes she has a sinus infection. Her symptoms have been present for 5 days. She has headaches and nasal congestion. She has been taking Mucinex with no relief. The postnasal drainage results in a cough. She uses her albuterol inhaler once daily when she has a sinus infection, usually at night. She does not use it very often when she does not have a sinus infection. Her bilateral ears and throat are itchy.     She had a diagnostic mammogram on 01/24/2023. The results were reviewed, and she was recommended to return to normal annual screening mammograms.   Her last Pap smear was in the office in 10/2020.   She had a colonoscopy in 09/2020. She will repeat her colonoscopy in 2030.   She has received a pneumonia vaccination.       The following portions of the patient's history were reviewed and updated as appropriate: allergies, current medications, past family history, past medical history, past social history, past surgical history, and problem list.    Objective      Physical Exam  Vitals reviewed.   HENT:      Right Ear: A middle ear effusion is present. Tympanic membrane is injected.      Left Ear: Tympanic membrane, ear canal and external ear normal.      Mouth/Throat:      Mouth: Mucous membranes are moist.      Pharynx: Oropharyngeal exudate present. No posterior oropharyngeal erythema.   Cardiovascular:      Rate and Rhythm: Normal rate.      Heart sounds: Normal heart sounds.   Pulmonary:      Effort: Pulmonary effort is normal.      Breath sounds: Normal breath sounds.   Chest:   Breasts:     Right: Normal. No mass, nipple discharge, skin change or tenderness.      Left: Normal. No mass, nipple discharge, skin change or tenderness.    Genitourinary:     Vagina: Normal.      Cervix: Normal.      Uterus: Normal.       Adnexa: Right adnexa normal and left adnexa normal.   Lymphadenopathy:      Upper Body:      Right upper body: No supraclavicular, axillary or pectoral adenopathy.      Left upper body: No supraclavicular, axillary or pectoral adenopathy.   Neurological:      Mental Status: She is alert.        Result Review :                Assessment and Plan    Diagnoses and all orders for this visit:    1. Annual physical exam (Primary)  -     CBC & Differential  -     Comprehensive Metabolic Panel  -     Lipid Panel With / Chol / HDL Ratio  -     Vitamin D,25-Hydroxy  -     TSH+Free T4  -     LIQUID-BASED PAP SMEAR, [facility] ([facility]); Future  -     LIQUID-BASED PAP SMEAR, [facility] ([facility])    2. Pap smear for cervical cancer screening  -     LIQUID-BASED PAP SMEAR, [facility] ([facility]); Future  -     LIQUID-BASED PAP SMEAR, [facility] ([facility])    3. Acute non-recurrent maxillary sinusitis  -     CBC & Differential  -     Comprehensive Metabolic Panel  -     Lipid Panel With / Chol / HDL Ratio  -     Vitamin D,25-Hydroxy  -     TSH+Free T4  -     amoxicillin-clavulanate (AUGMENTIN) 875-125 MG per tablet; Take 1 tablet by mouth 2 (Two) Times a Day.  Dispense: 20 tablet; Refill: 0    4. Non-recurrent acute suppurative otitis media of right ear without spontaneous rupture of tympanic membrane  -     amoxicillin-clavulanate (AUGMENTIN) 875-125 MG per tablet; Take 1 tablet by mouth 2 (Two) Times a Day.  Dispense: 20 tablet; Refill: 0    5. Mixed hyperlipidemia  -     CBC & Differential  -     Comprehensive Metabolic Panel  -     Lipid Panel With / Chol / HDL Ratio  -     Vitamin D,25-Hydroxy  -     TSH+Free T4    6. Acquired hypothyroidism  -     CBC & Differential  -     Comprehensive Metabolic Panel  -     Lipid Panel With / Chol / HDL Ratio  -     Vitamin D,25-Hydroxy  -     TSH+Free T4    7. Primary hypertension  -     CBC &  Differential  -     Comprehensive Metabolic Panel  -     Lipid Panel With / Chol / HDL Ratio  -     Vitamin D,25-Hydroxy  -     TSH+Free T4    8. Vitamin D deficiency  -     CBC & Differential  -     Comprehensive Metabolic Panel  -     Lipid Panel With / Chol / HDL Ratio  -     Vitamin D,25-Hydroxy  -     TSH+Free T4    9. Encounter for screening mammogram for malignant neoplasm of breast  -     Mammo Screening Digital Tomosynthesis Bilateral With CAD; Future      1. Annual exam  - An order has been placed for a mammogram.   - A Pap smear will be obtained today, 01/04/2024.   - Stay up to date on routine vaccinations  - Updated routine lab work will be obtained today, 01/04/2024.    2. Sinus infection  - She has been prescribed amoxicillin-clavulanate 875-125 mg 2 times daily for 10 days.   - She will contact the office if she develops a yeast infection.      Follow Up   Return in about 1 year (around 1/4/2025) for Annual.  Patient was given instructions and counseling regarding her condition or for health maintenance advice. Please see specific information pulled into the AVS if appropriate.     Transcribed from ambient dictation for Joann Hankins DO by Anitha Valencia.  01/04/24   12:05 EST    Patient or patient representative verbalized consent to the visit recording.  I have personally performed the services described in this document as transcribed by the above individual, and it is both accurate and complete.

## 2024-01-05 LAB
25(OH)D3+25(OH)D2 SERPL-MCNC: 26.4 NG/ML (ref 30–100)
ALBUMIN SERPL-MCNC: 4.5 G/DL (ref 3.5–5.2)
ALBUMIN/GLOB SERPL: 2 G/DL
ALP SERPL-CCNC: 76 U/L (ref 39–117)
ALT SERPL-CCNC: 62 U/L (ref 1–33)
AST SERPL-CCNC: 36 U/L (ref 1–32)
BASOPHILS # BLD AUTO: 0.05 10*3/MM3 (ref 0–0.2)
BASOPHILS NFR BLD AUTO: 0.9 % (ref 0–1.5)
BILIRUB SERPL-MCNC: 0.4 MG/DL (ref 0–1.2)
BUN SERPL-MCNC: 10 MG/DL (ref 8–23)
BUN/CREAT SERPL: 14.5 (ref 7–25)
CALCIUM SERPL-MCNC: 9.4 MG/DL (ref 8.6–10.5)
CHLORIDE SERPL-SCNC: 102 MMOL/L (ref 98–107)
CHOLEST SERPL-MCNC: 216 MG/DL (ref 0–200)
CHOLEST/HDLC SERPL: 2.6 {RATIO}
CO2 SERPL-SCNC: 24.8 MMOL/L (ref 22–29)
CREAT SERPL-MCNC: 0.69 MG/DL (ref 0.57–1)
EGFRCR SERPLBLD CKD-EPI 2021: 98.9 ML/MIN/1.73
EOSINOPHIL # BLD AUTO: 0.27 10*3/MM3 (ref 0–0.4)
EOSINOPHIL NFR BLD AUTO: 4.8 % (ref 0.3–6.2)
ERYTHROCYTE [DISTWIDTH] IN BLOOD BY AUTOMATED COUNT: 12.5 % (ref 12.3–15.4)
GLOBULIN SER CALC-MCNC: 2.2 GM/DL
GLUCOSE SERPL-MCNC: 76 MG/DL (ref 65–99)
HCT VFR BLD AUTO: 44.7 % (ref 34–46.6)
HDLC SERPL-MCNC: 83 MG/DL (ref 40–60)
HGB BLD-MCNC: 14.6 G/DL (ref 12–15.9)
IMM GRANULOCYTES # BLD AUTO: 0.01 10*3/MM3 (ref 0–0.05)
IMM GRANULOCYTES NFR BLD AUTO: 0.2 % (ref 0–0.5)
LDLC SERPL CALC-MCNC: 104 MG/DL (ref 0–100)
LYMPHOCYTES # BLD AUTO: 1.5 10*3/MM3 (ref 0.7–3.1)
LYMPHOCYTES NFR BLD AUTO: 26.8 % (ref 19.6–45.3)
MCH RBC QN AUTO: 31.8 PG (ref 26.6–33)
MCHC RBC AUTO-ENTMCNC: 32.7 G/DL (ref 31.5–35.7)
MCV RBC AUTO: 97.4 FL (ref 79–97)
MONOCYTES # BLD AUTO: 0.5 10*3/MM3 (ref 0.1–0.9)
MONOCYTES NFR BLD AUTO: 8.9 % (ref 5–12)
NEUTROPHILS # BLD AUTO: 3.27 10*3/MM3 (ref 1.7–7)
NEUTROPHILS NFR BLD AUTO: 58.4 % (ref 42.7–76)
NRBC BLD AUTO-RTO: 0 /100 WBC (ref 0–0.2)
PLATELET # BLD AUTO: 317 10*3/MM3 (ref 140–450)
POTASSIUM SERPL-SCNC: 4.9 MMOL/L (ref 3.5–5.2)
PROT SERPL-MCNC: 6.7 G/DL (ref 6–8.5)
RBC # BLD AUTO: 4.59 10*6/MM3 (ref 3.77–5.28)
SODIUM SERPL-SCNC: 139 MMOL/L (ref 136–145)
T4 FREE SERPL-MCNC: 1.68 NG/DL (ref 0.93–1.7)
TRIGL SERPL-MCNC: 175 MG/DL (ref 0–150)
TSH SERPL DL<=0.005 MIU/L-ACNC: 0.58 UIU/ML (ref 0.27–4.2)
VLDLC SERPL CALC-MCNC: 29 MG/DL (ref 5–40)
WBC # BLD AUTO: 5.6 10*3/MM3 (ref 3.4–10.8)

## 2024-01-07 DIAGNOSIS — E03.9 ACQUIRED HYPOTHYROIDISM: ICD-10-CM

## 2024-01-07 DIAGNOSIS — E78.2 MIXED HYPERLIPIDEMIA: ICD-10-CM

## 2024-01-07 RX ORDER — ROSUVASTATIN CALCIUM 40 MG/1
40 TABLET, COATED ORAL DAILY
Qty: 90 TABLET | Refills: 3 | Status: SHIPPED | OUTPATIENT
Start: 2024-01-07

## 2024-01-07 RX ORDER — FENOFIBRATE 48 MG/1
48 TABLET, COATED ORAL DAILY
Qty: 90 TABLET | Refills: 3 | Status: SHIPPED | OUTPATIENT
Start: 2024-01-07

## 2024-01-07 RX ORDER — LEVOTHYROXINE SODIUM 0.1 MG/1
100 TABLET ORAL EVERY MORNING
Qty: 90 TABLET | Refills: 3 | Status: SHIPPED | OUTPATIENT
Start: 2024-01-07

## 2024-01-09 LAB — REF LAB TEST METHOD: NORMAL

## 2024-05-12 DIAGNOSIS — J45.20 MILD INTERMITTENT ASTHMA WITHOUT COMPLICATION: ICD-10-CM

## 2024-05-13 RX ORDER — ALBUTEROL SULFATE 90 UG/1
2 AEROSOL, METERED RESPIRATORY (INHALATION) EVERY 4 HOURS PRN
Qty: 8.5 G | Refills: 1 | Status: SHIPPED | OUTPATIENT
Start: 2024-05-13

## 2024-06-08 DIAGNOSIS — E03.9 ACQUIRED HYPOTHYROIDISM: ICD-10-CM

## 2024-06-08 DIAGNOSIS — E78.2 MIXED HYPERLIPIDEMIA: ICD-10-CM

## 2024-06-08 DIAGNOSIS — I10 PRIMARY HYPERTENSION: ICD-10-CM

## 2024-06-08 DIAGNOSIS — J45.20 MILD INTERMITTENT ASTHMA WITHOUT COMPLICATION: ICD-10-CM

## 2024-06-10 RX ORDER — LEVOTHYROXINE SODIUM 0.1 MG/1
100 TABLET ORAL EVERY MORNING
Qty: 90 TABLET | Refills: 3 | OUTPATIENT
Start: 2024-06-10

## 2024-06-10 RX ORDER — ROSUVASTATIN CALCIUM 40 MG/1
40 TABLET, COATED ORAL DAILY
Qty: 90 TABLET | Refills: 3 | OUTPATIENT
Start: 2024-06-10

## 2024-06-10 RX ORDER — CITALOPRAM 40 MG/1
40 TABLET ORAL DAILY
Qty: 90 TABLET | Refills: 3 | OUTPATIENT
Start: 2024-06-10

## 2024-06-10 RX ORDER — MONTELUKAST SODIUM 10 MG/1
10 TABLET ORAL NIGHTLY
Qty: 90 TABLET | Refills: 3 | OUTPATIENT
Start: 2024-06-10

## 2024-06-10 RX ORDER — OLMESARTAN MEDOXOMIL AND HYDROCHLOROTHIAZIDE 20/12.5 20; 12.5 MG/1; MG/1
1 TABLET ORAL DAILY
Qty: 90 TABLET | Refills: 3 | OUTPATIENT
Start: 2024-06-10

## 2024-06-16 DIAGNOSIS — J45.20 MILD INTERMITTENT ASTHMA WITHOUT COMPLICATION: ICD-10-CM

## 2024-06-17 RX ORDER — ALBUTEROL SULFATE 90 UG/1
2 AEROSOL, METERED RESPIRATORY (INHALATION) EVERY 4 HOURS PRN
Qty: 8.5 G | Refills: 1 | Status: SHIPPED | OUTPATIENT
Start: 2024-06-17

## 2024-06-24 ENCOUNTER — OFFICE VISIT (OUTPATIENT)
Dept: FAMILY MEDICINE CLINIC | Facility: CLINIC | Age: 64
End: 2024-06-24
Payer: COMMERCIAL

## 2024-06-24 VITALS
DIASTOLIC BLOOD PRESSURE: 70 MMHG | BODY MASS INDEX: 22.57 KG/M2 | SYSTOLIC BLOOD PRESSURE: 118 MMHG | HEART RATE: 80 BPM | WEIGHT: 132.2 LBS | OXYGEN SATURATION: 98 % | TEMPERATURE: 97.1 F | RESPIRATION RATE: 20 BRPM | HEIGHT: 64 IN

## 2024-06-24 DIAGNOSIS — S00.83XD TRAUMATIC HEMATOMA OF FOREHEAD, SUBSEQUENT ENCOUNTER: Primary | ICD-10-CM

## 2024-06-24 PROCEDURE — 99213 OFFICE O/P EST LOW 20 MIN: CPT | Performed by: FAMILY MEDICINE

## 2024-06-24 NOTE — PROGRESS NOTES
"Chief Complaint   Patient presents with    FU from ER / facial contusion        Subjective      Florence Dee is a 64 y.o. who presents for a fall with resulting hematoma of the left forehead. She is doing well. She was told the hematoma may need evacuating if it did not go away in 2 weeks. She denies pain.     Objective   Vital Signs:  /70   Pulse 80   Temp 97.1 °F (36.2 °C)   Resp 20   Ht 162.6 cm (64\")   Wt 60 kg (132 lb 3.2 oz)   SpO2 98%   BMI 22.69 kg/m²     Physical Exam  Vitals reviewed.   Constitutional:       Appearance: Normal appearance.   HENT:      Head:     Neurological:      Mental Status: She is alert.          Result Review                     Assessment and Plan  Diagnoses and all orders for this visit:    1. Traumatic hematoma of forehead, subsequent encounter (Primary)  Comments:  Should resolve with time. No intervention required            Follow Up  No follow-ups on file.  Patient was given instructions and counseling regarding her condition or for health maintenance advice. Please see specific information pulled into the AVS if appropriate.       "

## 2024-07-22 DIAGNOSIS — E03.9 ACQUIRED HYPOTHYROIDISM: ICD-10-CM

## 2024-07-22 DIAGNOSIS — J45.20 MILD INTERMITTENT ASTHMA WITHOUT COMPLICATION: ICD-10-CM

## 2024-07-22 DIAGNOSIS — E78.2 MIXED HYPERLIPIDEMIA: ICD-10-CM

## 2024-07-22 DIAGNOSIS — I10 PRIMARY HYPERTENSION: ICD-10-CM

## 2024-07-22 RX ORDER — ROSUVASTATIN CALCIUM 40 MG/1
40 TABLET, COATED ORAL DAILY
Qty: 90 TABLET | Refills: 3 | Status: SHIPPED | OUTPATIENT
Start: 2024-07-22

## 2024-07-22 RX ORDER — CITALOPRAM 40 MG/1
40 TABLET ORAL DAILY
Qty: 90 TABLET | Refills: 3 | Status: SHIPPED | OUTPATIENT
Start: 2024-07-22

## 2024-07-22 RX ORDER — OLMESARTAN MEDOXOMIL AND HYDROCHLOROTHIAZIDE 20/12.5 20; 12.5 MG/1; MG/1
1 TABLET ORAL DAILY
Qty: 90 TABLET | Refills: 3 | Status: SHIPPED | OUTPATIENT
Start: 2024-07-22

## 2024-07-22 RX ORDER — LEVOTHYROXINE SODIUM 0.1 MG/1
100 TABLET ORAL EVERY MORNING
Qty: 90 TABLET | Refills: 3 | Status: SHIPPED | OUTPATIENT
Start: 2024-07-22

## 2024-07-22 RX ORDER — MONTELUKAST SODIUM 10 MG/1
10 TABLET ORAL NIGHTLY
Qty: 90 TABLET | Refills: 3 | Status: SHIPPED | OUTPATIENT
Start: 2024-07-22

## 2024-08-03 DIAGNOSIS — J45.20 MILD INTERMITTENT ASTHMA WITHOUT COMPLICATION: ICD-10-CM

## 2024-08-05 RX ORDER — ALBUTEROL SULFATE 90 UG/1
2 AEROSOL, METERED RESPIRATORY (INHALATION) EVERY 4 HOURS PRN
Qty: 8.5 G | Refills: 1 | Status: SHIPPED | OUTPATIENT
Start: 2024-08-05

## 2024-09-23 ENCOUNTER — TELEPHONE (OUTPATIENT)
Dept: FAMILY MEDICINE CLINIC | Facility: CLINIC | Age: 64
End: 2024-09-23
Payer: COMMERCIAL

## 2024-09-23 DIAGNOSIS — J45.20 MILD INTERMITTENT ASTHMA WITHOUT COMPLICATION: ICD-10-CM

## 2024-09-24 ENCOUNTER — OFFICE VISIT (OUTPATIENT)
Dept: FAMILY MEDICINE CLINIC | Facility: CLINIC | Age: 64
End: 2024-09-24
Payer: COMMERCIAL

## 2024-09-24 VITALS
RESPIRATION RATE: 14 BRPM | OXYGEN SATURATION: 95 % | HEIGHT: 64 IN | TEMPERATURE: 97.5 F | SYSTOLIC BLOOD PRESSURE: 112 MMHG | HEART RATE: 92 BPM | BODY MASS INDEX: 22.02 KG/M2 | WEIGHT: 129 LBS | DIASTOLIC BLOOD PRESSURE: 68 MMHG

## 2024-09-24 DIAGNOSIS — J20.9 ACUTE BRONCHITIS, UNSPECIFIED ORGANISM: Primary | ICD-10-CM

## 2024-09-24 PROCEDURE — 99213 OFFICE O/P EST LOW 20 MIN: CPT | Performed by: NURSE PRACTITIONER

## 2024-09-24 RX ORDER — ALBUTEROL SULFATE 90 UG/1
2 INHALANT RESPIRATORY (INHALATION) EVERY 4 HOURS PRN
Qty: 8.5 G | Refills: 1 | Status: SHIPPED | OUTPATIENT
Start: 2024-09-24

## 2024-09-24 RX ORDER — PREDNISONE 20 MG/1
20 TABLET ORAL DAILY
Qty: 3 TABLET | Refills: 0 | Status: SHIPPED | OUTPATIENT
Start: 2024-09-24 | End: 2024-09-27

## 2024-10-28 DIAGNOSIS — J20.9 ACUTE BRONCHITIS, UNSPECIFIED ORGANISM: Primary | ICD-10-CM

## 2024-10-28 RX ORDER — ALBUTEROL SULFATE 1.25 MG/3ML
1 SOLUTION RESPIRATORY (INHALATION) EVERY 6 HOURS PRN
Qty: 300 ML | Refills: 1 | Status: SHIPPED | OUTPATIENT
Start: 2024-10-28

## 2024-10-28 RX ORDER — ALBUTEROL SULFATE 0.63 MG/3ML
1 SOLUTION RESPIRATORY (INHALATION) DAILY PRN
Status: CANCELLED | OUTPATIENT
Start: 2024-10-28

## 2024-10-28 NOTE — TELEPHONE ENCOUNTER
"Caller: Florence Dee \"Jan\"    Relationship: Self    Best call back number: [phone]     Requested Prescriptions:   Requested Prescriptions     Pending Prescriptions Disp Refills    albuterol (ACCUNEB) 0.63 MG/3ML nebulizer solution       Sig: 3 mL Daily As Needed.        Pharmacy where request should be sent: [facility] [id number] [phone] [facility] [phone]      Last office visit with prescribing clinician: 1/4/2024   Last telemedicine visit with prescribing clinician: Visit date not found   Next office visit with prescribing clinician: Visit date not found     Additional details provided by patient: PT IS OUT OF MEDICATION.    Does the patient have less than a 3 day supply:  [x] Yes  [] No    Would you like a call back once the refill request has been completed: [] Yes [x] No    If the office needs to give you a call back, can they leave a voicemail: [] Yes [x] No    Ministerio Gandhi Rep   10/28/24 15:50 EDT   "

## 2024-11-03 DIAGNOSIS — J20.9 ACUTE BRONCHITIS, UNSPECIFIED ORGANISM: ICD-10-CM

## 2024-11-07 DIAGNOSIS — J45.20 MILD INTERMITTENT ASTHMA WITHOUT COMPLICATION: ICD-10-CM

## 2024-11-08 RX ORDER — ALBUTEROL SULFATE 90 UG/1
2 INHALANT RESPIRATORY (INHALATION) EVERY 4 HOURS PRN
Qty: 8.5 G | Refills: 1 | Status: SHIPPED | OUTPATIENT
Start: 2024-11-08

## 2025-01-10 DIAGNOSIS — J45.20 MILD INTERMITTENT ASTHMA WITHOUT COMPLICATION: ICD-10-CM

## 2025-01-10 RX ORDER — ALBUTEROL SULFATE 90 UG/1
2 INHALANT RESPIRATORY (INHALATION) EVERY 4 HOURS PRN
Qty: 8.5 G | Refills: 1 | Status: SHIPPED | OUTPATIENT
Start: 2025-01-10

## 2025-03-06 DIAGNOSIS — J45.20 MILD INTERMITTENT ASTHMA WITHOUT COMPLICATION: ICD-10-CM

## 2025-03-06 RX ORDER — ALBUTEROL SULFATE 90 UG/1
2 INHALANT RESPIRATORY (INHALATION) EVERY 4 HOURS PRN
Qty: 8.5 G | Refills: 1 | Status: SHIPPED | OUTPATIENT
Start: 2025-03-06

## 2025-04-09 DIAGNOSIS — J45.20 MILD INTERMITTENT ASTHMA WITHOUT COMPLICATION: ICD-10-CM

## 2025-04-10 RX ORDER — ALBUTEROL SULFATE 90 UG/1
2 INHALANT RESPIRATORY (INHALATION) EVERY 4 HOURS PRN
Qty: 8.5 G | Refills: 1 | Status: SHIPPED | OUTPATIENT
Start: 2025-04-10

## 2025-04-21 ENCOUNTER — TELEPHONE (OUTPATIENT)
Dept: FAMILY MEDICINE CLINIC | Facility: CLINIC | Age: 65
End: 2025-04-21

## 2025-04-21 NOTE — TELEPHONE ENCOUNTER
"Caller: Florence Dee \"Jan\"    Relationship: Self    Best call back number: [phone]     What medication are you requesting: VENTOLIN INHALER    What are your current symptoms: =ALLERGIES    How long have you been experiencing symptoms: 2 WEEKS    Have you had these symptoms before:    [x] Yes  [] No    Have you been treated for these symptoms before:   [x] Yes  [] No    If a prescription is needed, what is your preferred pharmacy and phone number: [facility] [id number] [place], [place] - [address] [place] [address] [phone] [facility] [phone]      Additional notes:  PATIENT HAS CALLED REQUESTING IF PCP CAN SEND IN A NEW PRESCRIPTION FOR VENTOLIN INHALER. PATIENT STATES THE ALBUTEROL DOES NOT WORK FOR HER.        "

## 2025-04-22 RX ORDER — ALBUTEROL SULFATE 90 UG/1
2 AEROSOL, METERED RESPIRATORY (INHALATION) EVERY 4 HOURS PRN
Qty: 8 G | Refills: 1 | Status: SHIPPED | OUTPATIENT
Start: 2025-04-22

## 2025-04-22 NOTE — TELEPHONE ENCOUNTER
She is due for annual exam and routine blood work.  Please schedule.  Albuterol inhaler changed to Ventolin.

## 2025-05-19 ENCOUNTER — APPOINTMENT (OUTPATIENT)
Dept: GENERAL RADIOLOGY | Facility: HOSPITAL | Age: 65
DRG: 193 | End: 2025-05-19
Payer: COMMERCIAL

## 2025-05-19 ENCOUNTER — HOSPITAL ENCOUNTER (INPATIENT)
Facility: HOSPITAL | Age: 65
LOS: 3 days | Discharge: HOME | DRG: 193 | End: 2025-05-24
Attending: EMERGENCY MEDICINE | Admitting: STUDENT IN AN ORGANIZED HEALTH CARE EDUCATION/TRAINING PROGRAM
Payer: COMMERCIAL

## 2025-05-19 DIAGNOSIS — J44.1 COPD EXACERBATION: ICD-10-CM

## 2025-05-19 DIAGNOSIS — I10 PRIMARY HYPERTENSION: ICD-10-CM

## 2025-05-19 DIAGNOSIS — R74.01 TRANSAMINITIS: ICD-10-CM

## 2025-05-19 DIAGNOSIS — J18.9 ATYPICAL PNEUMONIA: ICD-10-CM

## 2025-05-19 DIAGNOSIS — J96.01 ACUTE RESPIRATORY FAILURE WITH HYPOXIA: Primary | ICD-10-CM

## 2025-05-19 DIAGNOSIS — B34.8 PARAINFLUENZA VIRUS INFECTION: ICD-10-CM

## 2025-05-19 DIAGNOSIS — J96.21 ACUTE ON CHRONIC RESPIRATORY FAILURE WITH HYPOXIA: ICD-10-CM

## 2025-05-19 PROBLEM — E87.1 HYPONATREMIA: Status: ACTIVE | Noted: 2025-05-19

## 2025-05-19 PROBLEM — E78.5 DYSLIPIDEMIA: Status: ACTIVE | Noted: 2025-05-19

## 2025-05-19 PROBLEM — Z87.891 HISTORY OF CIGARETTE SMOKING: Status: ACTIVE | Noted: 2020-10-30

## 2025-05-19 LAB
ALBUMIN SERPL-MCNC: 4.3 G/DL (ref 3.5–5.2)
ALBUMIN/GLOB SERPL: 1.7 G/DL
ALP SERPL-CCNC: 87 U/L (ref 39–117)
ALT SERPL W P-5'-P-CCNC: 85 U/L (ref 1–33)
AMPHET+METHAMPHET UR QL: NEGATIVE
AMPHETAMINES UR QL: NEGATIVE
ANION GAP SERPL CALCULATED.3IONS-SCNC: 11 MMOL/L (ref 5–15)
ARTERIAL PATENCY WRIST A: ABNORMAL
AST SERPL-CCNC: 81 U/L (ref 1–32)
ATMOSPHERIC PRESS: ABNORMAL MM[HG]
B PARAPERT DNA SPEC QL NAA+PROBE: NOT DETECTED
B PERT DNA SPEC QL NAA+PROBE: NOT DETECTED
BARBITURATES UR QL SCN: NEGATIVE
BASE EXCESS BLDA CALC-SCNC: 2.9 MMOL/L (ref 0–2)
BASOPHILS # BLD AUTO: 0.02 10*3/MM3 (ref 0–0.2)
BASOPHILS NFR BLD AUTO: 0.3 % (ref 0–1.5)
BDY SITE: ABNORMAL
BENZODIAZ UR QL SCN: NEGATIVE
BILIRUB SERPL-MCNC: 0.3 MG/DL (ref 0–1.2)
BODY TEMPERATURE: 37
BUN SERPL-MCNC: 7 MG/DL (ref 8–23)
BUN/CREAT SERPL: 12.3 (ref 7–25)
BUPRENORPHINE SERPL-MCNC: NEGATIVE NG/ML
C PNEUM DNA NPH QL NAA+NON-PROBE: NOT DETECTED
CALCIUM SPEC-SCNC: 8.4 MG/DL (ref 8.6–10.5)
CANNABINOIDS SERPL QL: NEGATIVE
CHLORIDE SERPL-SCNC: 88 MMOL/L (ref 98–107)
CO2 BLDA-SCNC: 29.4 MMOL/L (ref 22–33)
CO2 SERPL-SCNC: 26 MMOL/L (ref 22–29)
COCAINE UR QL: NEGATIVE
COHGB MFR BLD: 3.2 % (ref 0–2)
CREAT SERPL-MCNC: 0.57 MG/DL (ref 0.57–1)
D DIMER PPP FEU-MCNC: 0.63 MCGFEU/ML (ref 0–0.62)
D-LACTATE SERPL-SCNC: 1.3 MMOL/L (ref 0.5–2)
DEPRECATED RDW RBC AUTO: 47.8 FL (ref 37–54)
EGFRCR SERPLBLD CKD-EPI 2021: 102.9 ML/MIN/1.73
EOSINOPHIL # BLD AUTO: 0.07 10*3/MM3 (ref 0–0.4)
EOSINOPHIL NFR BLD AUTO: 1 % (ref 0.3–6.2)
EPAP: 0
ERYTHROCYTE [DISTWIDTH] IN BLOOD BY AUTOMATED COUNT: 12.7 % (ref 12.3–15.4)
FENTANYL UR-MCNC: NEGATIVE NG/ML
FLUAV SUBTYP SPEC NAA+PROBE: NOT DETECTED
FLUBV RNA ISLT QL NAA+PROBE: NOT DETECTED
GEN 5 1HR TROPONIN T REFLEX: <6 NG/L
GLOBULIN UR ELPH-MCNC: 2.5 GM/DL
GLUCOSE SERPL-MCNC: 106 MG/DL (ref 65–99)
HADV DNA SPEC NAA+PROBE: NOT DETECTED
HCO3 BLDA-SCNC: 28 MMOL/L (ref 20–26)
HCOV 229E RNA SPEC QL NAA+PROBE: NOT DETECTED
HCOV HKU1 RNA SPEC QL NAA+PROBE: NOT DETECTED
HCOV NL63 RNA SPEC QL NAA+PROBE: NOT DETECTED
HCOV OC43 RNA SPEC QL NAA+PROBE: NOT DETECTED
HCT VFR BLD AUTO: 43.2 % (ref 34–46.6)
HCT VFR BLD CALC: 43.7 % (ref 38–51)
HGB BLD-MCNC: 14.4 G/DL (ref 12–15.9)
HGB BLDA-MCNC: 14.3 G/DL (ref 14–18)
HMPV RNA NPH QL NAA+NON-PROBE: NOT DETECTED
HOLD SPECIMEN: NORMAL
HPIV1 RNA ISLT QL NAA+PROBE: NOT DETECTED
HPIV2 RNA SPEC QL NAA+PROBE: NOT DETECTED
HPIV3 RNA NPH QL NAA+PROBE: DETECTED
HPIV4 P GENE NPH QL NAA+PROBE: NOT DETECTED
IMM GRANULOCYTES # BLD AUTO: 0.04 10*3/MM3 (ref 0–0.05)
IMM GRANULOCYTES NFR BLD AUTO: 0.6 % (ref 0–0.5)
INHALED O2 CONCENTRATION: 36 %
IPAP: 0
LYMPHOCYTES # BLD AUTO: 0.55 10*3/MM3 (ref 0.7–3.1)
LYMPHOCYTES NFR BLD AUTO: 7.7 % (ref 19.6–45.3)
M PNEUMO IGG SER IA-ACNC: NOT DETECTED
MCH RBC QN AUTO: 33.3 PG (ref 26.6–33)
MCHC RBC AUTO-ENTMCNC: 33.3 G/DL (ref 31.5–35.7)
MCV RBC AUTO: 100 FL (ref 79–97)
METHADONE UR QL SCN: NEGATIVE
METHGB BLD QL: 0.4 % (ref 0–1.5)
MODALITY: ABNORMAL
MONOCYTES # BLD AUTO: 0.45 10*3/MM3 (ref 0.1–0.9)
MONOCYTES NFR BLD AUTO: 6.3 % (ref 5–12)
NEUTROPHILS NFR BLD AUTO: 6.01 10*3/MM3 (ref 1.7–7)
NEUTROPHILS NFR BLD AUTO: 84.1 % (ref 42.7–76)
NRBC BLD AUTO-RTO: 0 /100 WBC (ref 0–0.2)
NT-PROBNP SERPL-MCNC: 36.6 PG/ML (ref 0–900)
OPIATES UR QL: NEGATIVE
OSMOLALITY SERPL: 271 MOSM/KG (ref 275–295)
OSMOLALITY UR: 358 MOSM/KG (ref 300–1100)
OXYCODONE UR QL SCN: NEGATIVE
OXYHGB MFR BLDV: 92.6 % (ref 94–99)
PAW @ PEAK INSP FLOW SETTING VENT: 0 CMH2O
PCO2 BLDA: 44.1 MM HG (ref 35–45)
PCO2 TEMP ADJ BLD: 44.1 MM HG (ref 35–45)
PCP UR QL SCN: NEGATIVE
PH BLDA: 7.41 PH UNITS (ref 7.35–7.45)
PH, TEMP CORRECTED: 7.41 PH UNITS
PLATELET # BLD AUTO: 254 10*3/MM3 (ref 140–450)
PMV BLD AUTO: 8.6 FL (ref 6–12)
PO2 BLDA: 76.4 MM HG (ref 83–108)
PO2 TEMP ADJ BLD: 76.4 MM HG (ref 83–108)
POTASSIUM SERPL-SCNC: 4.2 MMOL/L (ref 3.5–5.2)
PROT SERPL-MCNC: 6.8 G/DL (ref 6–8.5)
QT INTERVAL: 348 MS
QTC INTERVAL: 457 MS
RBC # BLD AUTO: 4.32 10*6/MM3 (ref 3.77–5.28)
RHINOVIRUS RNA SPEC NAA+PROBE: NOT DETECTED
RSV RNA NPH QL NAA+NON-PROBE: NOT DETECTED
SARS-COV-2 RNA RESP QL NAA+PROBE: NOT DETECTED
SODIUM SERPL-SCNC: 124 MMOL/L (ref 136–145)
SODIUM SERPL-SCNC: 125 MMOL/L (ref 136–145)
SODIUM SERPL-SCNC: 125 MMOL/L (ref 136–145)
SODIUM SERPL-SCNC: 126 MMOL/L (ref 136–145)
SODIUM SERPL-SCNC: 127 MMOL/L (ref 136–145)
SODIUM UR-SCNC: 28 MMOL/L
TOTAL RATE: 0 BREATHS/MINUTE
TRICYCLICS UR QL SCN: NEGATIVE
TROPONIN T NUMERIC DELTA: NORMAL
TROPONIN T SERPL HS-MCNC: <6 NG/L
WBC NRBC COR # BLD AUTO: 7.14 10*3/MM3 (ref 3.4–10.8)
WHOLE BLOOD HOLD COAG: NORMAL
WHOLE BLOOD HOLD SPECIMEN: NORMAL

## 2025-05-19 PROCEDURE — 63710000001 REVEFENACIN 175 MCG/3ML SOLUTION: Performed by: NURSE PRACTITIONER

## 2025-05-19 PROCEDURE — 94761 N-INVAS EAR/PLS OXIMETRY MLT: CPT

## 2025-05-19 PROCEDURE — 84484 ASSAY OF TROPONIN QUANT: CPT | Performed by: EMERGENCY MEDICINE

## 2025-05-19 PROCEDURE — 36415 COLL VENOUS BLD VENIPUNCTURE: CPT

## 2025-05-19 PROCEDURE — G0378 HOSPITAL OBSERVATION PER HR: HCPCS

## 2025-05-19 PROCEDURE — 36600 WITHDRAWAL OF ARTERIAL BLOOD: CPT

## 2025-05-19 PROCEDURE — 84300 ASSAY OF URINE SODIUM: CPT | Performed by: NURSE PRACTITIONER

## 2025-05-19 PROCEDURE — 25010000002 DOXYCYCLINE 100 MG RECONSTITUTED SOLUTION 1 EACH VIAL: Performed by: EMERGENCY MEDICINE

## 2025-05-19 PROCEDURE — 83930 ASSAY OF BLOOD OSMOLALITY: CPT | Performed by: NURSE PRACTITIONER

## 2025-05-19 PROCEDURE — 25010000002 THIAMINE HCL 200 MG/2ML SOLUTION: Performed by: INTERNAL MEDICINE

## 2025-05-19 PROCEDURE — 99291 CRITICAL CARE FIRST HOUR: CPT

## 2025-05-19 PROCEDURE — 94640 AIRWAY INHALATION TREATMENT: CPT

## 2025-05-19 PROCEDURE — 87077 CULTURE AEROBIC IDENTIFY: CPT | Performed by: NURSE PRACTITIONER

## 2025-05-19 PROCEDURE — 80053 COMPREHEN METABOLIC PANEL: CPT | Performed by: EMERGENCY MEDICINE

## 2025-05-19 PROCEDURE — 25010000002 DOXYCYCLINE 100 MG RECONSTITUTED SOLUTION 1 EACH VIAL: Performed by: NURSE PRACTITIONER

## 2025-05-19 PROCEDURE — 25010000002 MAGNESIUM SULFATE IN D5W 1G/100ML (PREMIX) 1-5 GM/100ML-% SOLUTION: Performed by: EMERGENCY MEDICINE

## 2025-05-19 PROCEDURE — 25810000003 SODIUM CHLORIDE 0.9 % SOLUTION: Performed by: NURSE PRACTITIONER

## 2025-05-19 PROCEDURE — 83880 ASSAY OF NATRIURETIC PEPTIDE: CPT | Performed by: EMERGENCY MEDICINE

## 2025-05-19 PROCEDURE — 93005 ELECTROCARDIOGRAM TRACING: CPT | Performed by: EMERGENCY MEDICINE

## 2025-05-19 PROCEDURE — 83050 HGB METHEMOGLOBIN QUAN: CPT

## 2025-05-19 PROCEDURE — 87040 BLOOD CULTURE FOR BACTERIA: CPT | Performed by: EMERGENCY MEDICINE

## 2025-05-19 PROCEDURE — 82805 BLOOD GASES W/O2 SATURATION: CPT

## 2025-05-19 PROCEDURE — 83605 ASSAY OF LACTIC ACID: CPT | Performed by: EMERGENCY MEDICINE

## 2025-05-19 PROCEDURE — 71045 X-RAY EXAM CHEST 1 VIEW: CPT

## 2025-05-19 PROCEDURE — 85025 COMPLETE CBC W/AUTO DIFF WBC: CPT | Performed by: EMERGENCY MEDICINE

## 2025-05-19 PROCEDURE — 94799 UNLISTED PULMONARY SVC/PX: CPT

## 2025-05-19 PROCEDURE — 0202U NFCT DS 22 TRGT SARS-COV-2: CPT | Performed by: EMERGENCY MEDICINE

## 2025-05-19 PROCEDURE — 83935 ASSAY OF URINE OSMOLALITY: CPT | Performed by: NURSE PRACTITIONER

## 2025-05-19 PROCEDURE — 87205 SMEAR GRAM STAIN: CPT | Performed by: NURSE PRACTITIONER

## 2025-05-19 PROCEDURE — 25010000002 METHYLPREDNISOLONE PER 125 MG: Performed by: EMERGENCY MEDICINE

## 2025-05-19 PROCEDURE — 85379 FIBRIN DEGRADATION QUANT: CPT | Performed by: NURSE PRACTITIONER

## 2025-05-19 PROCEDURE — 87186 SC STD MICRODIL/AGAR DIL: CPT | Performed by: NURSE PRACTITIONER

## 2025-05-19 PROCEDURE — 87070 CULTURE OTHR SPECIMN AEROBIC: CPT | Performed by: NURSE PRACTITIONER

## 2025-05-19 PROCEDURE — 25010000002 ENOXAPARIN PER 10 MG: Performed by: NURSE PRACTITIONER

## 2025-05-19 PROCEDURE — 25010000002 METHYLPREDNISOLONE PER 125 MG: Performed by: INTERNAL MEDICINE

## 2025-05-19 PROCEDURE — 99223 1ST HOSP IP/OBS HIGH 75: CPT | Performed by: NURSE PRACTITIONER

## 2025-05-19 PROCEDURE — 82375 ASSAY CARBOXYHB QUANT: CPT

## 2025-05-19 PROCEDURE — 84295 ASSAY OF SERUM SODIUM: CPT | Performed by: NURSE PRACTITIONER

## 2025-05-19 PROCEDURE — 80307 DRUG TEST PRSMV CHEM ANLYZR: CPT | Performed by: INTERNAL MEDICINE

## 2025-05-19 RX ORDER — FENOFIBRATE 48 MG/1
48 TABLET, FILM COATED ORAL DAILY
Status: DISCONTINUED | OUTPATIENT
Start: 2025-05-19 | End: 2025-05-24 | Stop reason: HOSPADM

## 2025-05-19 RX ORDER — CITALOPRAM HYDROBROMIDE 40 MG/1
40 TABLET ORAL DAILY
Status: DISCONTINUED | OUTPATIENT
Start: 2025-05-19 | End: 2025-05-21

## 2025-05-19 RX ORDER — ACETAMINOPHEN 650 MG/1
650 SUPPOSITORY RECTAL EVERY 4 HOURS PRN
Status: DISCONTINUED | OUTPATIENT
Start: 2025-05-19 | End: 2025-05-24 | Stop reason: HOSPADM

## 2025-05-19 RX ORDER — SODIUM CHLORIDE 9 MG/ML
75 INJECTION, SOLUTION INTRAVENOUS CONTINUOUS
Status: DISCONTINUED | OUTPATIENT
Start: 2025-05-19 | End: 2025-05-19

## 2025-05-19 RX ORDER — MIDAZOLAM HYDROCHLORIDE 1 MG/ML
4 INJECTION, SOLUTION INTRAMUSCULAR; INTRAVENOUS
Status: DISCONTINUED | OUTPATIENT
Start: 2025-05-19 | End: 2025-05-24 | Stop reason: HOSPADM

## 2025-05-19 RX ORDER — SODIUM CHLORIDE 0.9 % (FLUSH) 0.9 %
10 SYRINGE (ML) INJECTION EVERY 12 HOURS SCHEDULED
Status: DISCONTINUED | OUTPATIENT
Start: 2025-05-19 | End: 2025-05-24 | Stop reason: HOSPADM

## 2025-05-19 RX ORDER — METHYLPREDNISOLONE SODIUM SUCCINATE 125 MG/2ML
80 INJECTION, POWDER, LYOPHILIZED, FOR SOLUTION INTRAMUSCULAR; INTRAVENOUS EVERY 12 HOURS
Status: COMPLETED | OUTPATIENT
Start: 2025-05-19 | End: 2025-05-22

## 2025-05-19 RX ORDER — ROSUVASTATIN CALCIUM 20 MG/1
40 TABLET, COATED ORAL DAILY
Status: DISCONTINUED | OUTPATIENT
Start: 2025-05-19 | End: 2025-05-24 | Stop reason: HOSPADM

## 2025-05-19 RX ORDER — ACETAMINOPHEN 500 MG
1000 TABLET ORAL ONCE
Status: COMPLETED | OUTPATIENT
Start: 2025-05-19 | End: 2025-05-19

## 2025-05-19 RX ORDER — SODIUM CHLORIDE 0.9 % (FLUSH) 0.9 %
10 SYRINGE (ML) INJECTION AS NEEDED
Status: DISCONTINUED | OUTPATIENT
Start: 2025-05-19 | End: 2025-05-24 | Stop reason: HOSPADM

## 2025-05-19 RX ORDER — MIDAZOLAM HYDROCHLORIDE 1 MG/ML
2 INJECTION, SOLUTION INTRAMUSCULAR; INTRAVENOUS
Status: DISCONTINUED | OUTPATIENT
Start: 2025-05-19 | End: 2025-05-24 | Stop reason: HOSPADM

## 2025-05-19 RX ORDER — BISACODYL 10 MG
10 SUPPOSITORY, RECTAL RECTAL DAILY PRN
Status: DISCONTINUED | OUTPATIENT
Start: 2025-05-19 | End: 2025-05-24 | Stop reason: HOSPADM

## 2025-05-19 RX ORDER — ACETAMINOPHEN 325 MG/1
650 TABLET ORAL EVERY 4 HOURS PRN
Status: DISCONTINUED | OUTPATIENT
Start: 2025-05-19 | End: 2025-05-24 | Stop reason: HOSPADM

## 2025-05-19 RX ORDER — BISACODYL 5 MG/1
5 TABLET, DELAYED RELEASE ORAL DAILY PRN
Status: DISCONTINUED | OUTPATIENT
Start: 2025-05-19 | End: 2025-05-24 | Stop reason: HOSPADM

## 2025-05-19 RX ORDER — METHYLPREDNISOLONE SODIUM SUCCINATE 125 MG/2ML
125 INJECTION, POWDER, LYOPHILIZED, FOR SOLUTION INTRAMUSCULAR; INTRAVENOUS ONCE
Status: COMPLETED | OUTPATIENT
Start: 2025-05-19 | End: 2025-05-19

## 2025-05-19 RX ORDER — METHYLPREDNISOLONE SODIUM SUCCINATE 125 MG/2ML
62.5 INJECTION, POWDER, LYOPHILIZED, FOR SOLUTION INTRAMUSCULAR; INTRAVENOUS DAILY
Status: DISCONTINUED | OUTPATIENT
Start: 2025-05-20 | End: 2025-05-19

## 2025-05-19 RX ORDER — NITROGLYCERIN 0.4 MG/1
0.4 TABLET SUBLINGUAL
Status: DISCONTINUED | OUTPATIENT
Start: 2025-05-19 | End: 2025-05-24 | Stop reason: HOSPADM

## 2025-05-19 RX ORDER — ENOXAPARIN SODIUM 100 MG/ML
40 INJECTION SUBCUTANEOUS DAILY
Status: DISCONTINUED | OUTPATIENT
Start: 2025-05-19 | End: 2025-05-24 | Stop reason: HOSPADM

## 2025-05-19 RX ORDER — IPRATROPIUM BROMIDE AND ALBUTEROL SULFATE 2.5; .5 MG/3ML; MG/3ML
3 SOLUTION RESPIRATORY (INHALATION) EVERY 4 HOURS PRN
Status: DISCONTINUED | OUTPATIENT
Start: 2025-05-19 | End: 2025-05-24 | Stop reason: HOSPADM

## 2025-05-19 RX ORDER — BUDESONIDE 0.5 MG/2ML
0.5 INHALANT ORAL
Status: DISCONTINUED | OUTPATIENT
Start: 2025-05-19 | End: 2025-05-24 | Stop reason: HOSPADM

## 2025-05-19 RX ORDER — IPRATROPIUM BROMIDE AND ALBUTEROL SULFATE 2.5; .5 MG/3ML; MG/3ML
3 SOLUTION RESPIRATORY (INHALATION)
Status: DISCONTINUED | OUTPATIENT
Start: 2025-05-19 | End: 2025-05-20

## 2025-05-19 RX ORDER — LOSARTAN POTASSIUM 50 MG/1
50 TABLET ORAL
Status: DISCONTINUED | OUTPATIENT
Start: 2025-05-19 | End: 2025-05-24 | Stop reason: HOSPADM

## 2025-05-19 RX ORDER — ARFORMOTEROL TARTRATE 15 UG/2ML
15 SOLUTION RESPIRATORY (INHALATION)
Status: DISCONTINUED | OUTPATIENT
Start: 2025-05-19 | End: 2025-05-24 | Stop reason: HOSPADM

## 2025-05-19 RX ORDER — ONDANSETRON 2 MG/ML
4 INJECTION INTRAMUSCULAR; INTRAVENOUS EVERY 6 HOURS PRN
Status: DISCONTINUED | OUTPATIENT
Start: 2025-05-19 | End: 2025-05-24 | Stop reason: HOSPADM

## 2025-05-19 RX ORDER — AMOXICILLIN 250 MG
2 CAPSULE ORAL 2 TIMES DAILY PRN
Status: DISCONTINUED | OUTPATIENT
Start: 2025-05-19 | End: 2025-05-24 | Stop reason: HOSPADM

## 2025-05-19 RX ORDER — MAGNESIUM SULFATE 1 G/100ML
1 INJECTION INTRAVENOUS ONCE
Status: COMPLETED | OUTPATIENT
Start: 2025-05-19 | End: 2025-05-19

## 2025-05-19 RX ORDER — GUAIFENESIN 600 MG/1
600 TABLET, EXTENDED RELEASE ORAL EVERY 12 HOURS SCHEDULED
Status: DISCONTINUED | OUTPATIENT
Start: 2025-05-19 | End: 2025-05-24 | Stop reason: HOSPADM

## 2025-05-19 RX ORDER — NICOTINE 21 MG/24HR
1 PATCH, TRANSDERMAL 24 HOURS TRANSDERMAL
Status: DISCONTINUED | OUTPATIENT
Start: 2025-05-19 | End: 2025-05-24 | Stop reason: HOSPADM

## 2025-05-19 RX ORDER — ACETAMINOPHEN 160 MG/5ML
650 SOLUTION ORAL EVERY 4 HOURS PRN
Status: DISCONTINUED | OUTPATIENT
Start: 2025-05-19 | End: 2025-05-24 | Stop reason: HOSPADM

## 2025-05-19 RX ORDER — MONTELUKAST SODIUM 10 MG/1
10 TABLET ORAL NIGHTLY
Status: DISCONTINUED | OUTPATIENT
Start: 2025-05-19 | End: 2025-05-24 | Stop reason: HOSPADM

## 2025-05-19 RX ORDER — LORAZEPAM 1 MG/1
1 TABLET ORAL
Status: DISCONTINUED | OUTPATIENT
Start: 2025-05-19 | End: 2025-05-24 | Stop reason: HOSPADM

## 2025-05-19 RX ORDER — LORAZEPAM 1 MG/1
2 TABLET ORAL
Status: DISCONTINUED | OUTPATIENT
Start: 2025-05-19 | End: 2025-05-24 | Stop reason: HOSPADM

## 2025-05-19 RX ORDER — WATER 10 ML/10ML
INJECTION INTRAMUSCULAR; INTRAVENOUS; SUBCUTANEOUS
Status: COMPLETED
Start: 2025-05-19 | End: 2025-05-19

## 2025-05-19 RX ORDER — FOLIC ACID 1 MG/1
1 TABLET ORAL DAILY
Status: DISCONTINUED | OUTPATIENT
Start: 2025-05-19 | End: 2025-05-24 | Stop reason: HOSPADM

## 2025-05-19 RX ORDER — ONDANSETRON 4 MG/1
4 TABLET, ORALLY DISINTEGRATING ORAL EVERY 6 HOURS PRN
Status: DISCONTINUED | OUTPATIENT
Start: 2025-05-19 | End: 2025-05-24 | Stop reason: HOSPADM

## 2025-05-19 RX ORDER — IPRATROPIUM BROMIDE AND ALBUTEROL SULFATE 2.5; .5 MG/3ML; MG/3ML
3 SOLUTION RESPIRATORY (INHALATION) ONCE
Status: COMPLETED | OUTPATIENT
Start: 2025-05-19 | End: 2025-05-19

## 2025-05-19 RX ORDER — MULTIPLE VITAMINS W/ MINERALS TAB 9MG-400MCG
1 TAB ORAL DAILY
Status: DISCONTINUED | OUTPATIENT
Start: 2025-05-19 | End: 2025-05-24 | Stop reason: HOSPADM

## 2025-05-19 RX ORDER — THIAMINE HYDROCHLORIDE 100 MG/ML
200 INJECTION, SOLUTION INTRAMUSCULAR; INTRAVENOUS EVERY 8 HOURS SCHEDULED
Status: DISCONTINUED | OUTPATIENT
Start: 2025-05-19 | End: 2025-05-24 | Stop reason: HOSPADM

## 2025-05-19 RX ORDER — POLYETHYLENE GLYCOL 3350 17 G/17G
17 POWDER, FOR SOLUTION ORAL DAILY PRN
Status: DISCONTINUED | OUTPATIENT
Start: 2025-05-19 | End: 2025-05-24 | Stop reason: HOSPADM

## 2025-05-19 RX ORDER — BENZONATATE 100 MG/1
200 CAPSULE ORAL 3 TIMES DAILY PRN
Status: DISCONTINUED | OUTPATIENT
Start: 2025-05-19 | End: 2025-05-24 | Stop reason: HOSPADM

## 2025-05-19 RX ORDER — LEVOTHYROXINE SODIUM 100 UG/1
100 TABLET ORAL EVERY MORNING
Status: DISCONTINUED | OUTPATIENT
Start: 2025-05-19 | End: 2025-05-24

## 2025-05-19 RX ORDER — SODIUM CHLORIDE 9 MG/ML
40 INJECTION, SOLUTION INTRAVENOUS AS NEEDED
Status: DISCONTINUED | OUTPATIENT
Start: 2025-05-19 | End: 2025-05-24 | Stop reason: HOSPADM

## 2025-05-19 RX ADMIN — SODIUM CHLORIDE 75 ML/HR: 9 INJECTION, SOLUTION INTRAVENOUS at 07:45

## 2025-05-19 RX ADMIN — WATER 10 ML: 1 INJECTION INTRAMUSCULAR; INTRAVENOUS; SUBCUTANEOUS at 17:30

## 2025-05-19 RX ADMIN — METHYLPREDNISOLONE SODIUM SUCCINATE 125 MG: 125 INJECTION, POWDER, FOR SOLUTION INTRAMUSCULAR; INTRAVENOUS at 03:21

## 2025-05-19 RX ADMIN — BUDESONIDE 0.5 MG: 0.5 SUSPENSION RESPIRATORY (INHALATION) at 21:28

## 2025-05-19 RX ADMIN — THIAMINE HYDROCHLORIDE 200 MG: 100 INJECTION, SOLUTION INTRAMUSCULAR; INTRAVENOUS at 21:16

## 2025-05-19 RX ADMIN — ACETAMINOPHEN 1000 MG: 500 TABLET ORAL at 04:59

## 2025-05-19 RX ADMIN — ARFORMOTEROL TARTRATE 15 MCG: 15 SOLUTION RESPIRATORY (INHALATION) at 21:28

## 2025-05-19 RX ADMIN — LOSARTAN POTASSIUM 50 MG: 50 TABLET, FILM COATED ORAL at 09:59

## 2025-05-19 RX ADMIN — MONTELUKAST 10 MG: 10 TABLET, FILM COATED ORAL at 21:16

## 2025-05-19 RX ADMIN — DOXYCYCLINE 100 MG: 100 INJECTION, POWDER, LYOPHILIZED, FOR SOLUTION INTRAVENOUS at 06:05

## 2025-05-19 RX ADMIN — CITALOPRAM HYDROBROMIDE 40 MG: 40 TABLET ORAL at 09:58

## 2025-05-19 RX ADMIN — IPRATROPIUM BROMIDE AND ALBUTEROL SULFATE 3 ML: 2.5; .5 SOLUTION RESPIRATORY (INHALATION) at 03:29

## 2025-05-19 RX ADMIN — Medication 10 ML: at 21:18

## 2025-05-19 RX ADMIN — LEVOTHYROXINE SODIUM 100 MCG: 0.1 TABLET ORAL at 09:58

## 2025-05-19 RX ADMIN — MAGNESIUM SULFATE 1 G: 1 INJECTION INTRAVENOUS at 05:00

## 2025-05-19 RX ADMIN — IPRATROPIUM BROMIDE AND ALBUTEROL SULFATE 3 ML: 2.5; .5 SOLUTION RESPIRATORY (INHALATION) at 21:27

## 2025-05-19 RX ADMIN — ROSUVASTATIN 40 MG: 20 TABLET, FILM COATED ORAL at 09:58

## 2025-05-19 RX ADMIN — NICOTINE 1 PATCH: 21 PATCH TRANSDERMAL at 17:30

## 2025-05-19 RX ADMIN — IPRATROPIUM BROMIDE AND ALBUTEROL SULFATE 3 ML: 2.5; .5 SOLUTION RESPIRATORY (INHALATION) at 17:29

## 2025-05-19 RX ADMIN — FENOFIBRATE 48 MG: 48 TABLET ORAL at 12:18

## 2025-05-19 RX ADMIN — Medication 10 ML: at 09:59

## 2025-05-19 RX ADMIN — Medication 1 TABLET: at 17:30

## 2025-05-19 RX ADMIN — METHYLPREDNISOLONE SODIUM SUCCINATE 80 MG: 125 INJECTION, POWDER, FOR SOLUTION INTRAMUSCULAR; INTRAVENOUS at 17:30

## 2025-05-19 RX ADMIN — ACETAMINOPHEN 650 MG: 325 TABLET ORAL at 21:16

## 2025-05-19 RX ADMIN — BENZONATATE 200 MG: 100 CAPSULE ORAL at 17:30

## 2025-05-19 RX ADMIN — ENOXAPARIN SODIUM 40 MG: 100 INJECTION SUBCUTANEOUS at 09:59

## 2025-05-19 RX ADMIN — BUDESONIDE 0.5 MG: 0.5 SUSPENSION RESPIRATORY (INHALATION) at 11:25

## 2025-05-19 RX ADMIN — IPRATROPIUM BROMIDE AND ALBUTEROL SULFATE 3 ML: 2.5; .5 SOLUTION RESPIRATORY (INHALATION) at 04:46

## 2025-05-19 RX ADMIN — FOLIC ACID 1 MG: 1 TABLET ORAL at 17:30

## 2025-05-19 RX ADMIN — GUAIFENESIN 600 MG: 600 TABLET, EXTENDED RELEASE ORAL at 09:58

## 2025-05-19 RX ADMIN — GUAIFENESIN 600 MG: 600 TABLET, EXTENDED RELEASE ORAL at 21:16

## 2025-05-19 RX ADMIN — REVEFENACIN 175 MCG: 175 SOLUTION RESPIRATORY (INHALATION) at 11:25

## 2025-05-19 RX ADMIN — DOXYCYCLINE 100 MG: 100 INJECTION, POWDER, LYOPHILIZED, FOR SOLUTION INTRAVENOUS at 17:29

## 2025-05-19 RX ADMIN — ARFORMOTEROL TARTRATE 15 MCG: 15 SOLUTION RESPIRATORY (INHALATION) at 11:25

## 2025-05-19 NOTE — PLAN OF CARE
Goal Outcome Evaluation:              Outcome Evaluation: VSS on 3-4L. report of SOA and hot flashes on exertion. prn and scheduled breathing tx as ordered. seizure precautions in place. CIWA 1.

## 2025-05-19 NOTE — PROGRESS NOTES
[facility] Medicine Services  PROGRESS NOTE    Patient Name: Florence Dee  : 1960  MRN: [id number]    Date of Admission: 2025  Primary Care Physician: Joann Hankins DO    Subjective   Subjective     CC:  SOB    HPI:  Resting in bed no acute distress but still has shortness of breath on she feels tight in the chest.  No fever or chills.  No chest pain or palpitation or shortness of breath.  No nausea or vomiting.      Objective   Objective     Vital Signs:   Temp:  [98 °F (36.7 °C)-100.4 °F (38 °C)] 98.6 °F (37 °C)  Heart Rate:  [] 81  Resp:  [18-22] 20  BP: (116-149)/(71-93) 129/85  Flow (L/min) (Oxygen Therapy):  [2-15] 4     Physical Exam:  Constitutional: No acute distress, awake, alert  HENT: NCAT, mucous membranes moist  Respiratory: Harsh breath sounds, cough on deep inspiration, respiratory effort normal, currently on 4 L of nasal cannula and saturating above 96%.  Cardiovascular: RRR, no murmurs, rubs, or gallops  Gastrointestinal: Positive bowel sounds, soft, nontender, nondistended  Musculoskeletal: No bilateral ankle edema  Psychiatric: Appropriate affect, cooperative  Neurologic: Oriented x 3, speech clear  Skin: No rashes     Results Reviewed:  LAB RESULTS:      Lab 25  0506 25  0320   WBC  --  7.14   HEMOGLOBIN  --  14.4   HEMATOCRIT  --  43.2   PLATELETS  --  254   NEUTROS ABS  --  6.01   IMMATURE GRANS (ABS)  --  0.04   LYMPHS ABS  --  0.55*   MONOS ABS  --  0.45   EOS ABS  --  0.07   MCV  --  100.0*   LACTATE  --  1.3   D DIMER QUANT  --  0.63*   HSTROP T <6 <6         Lab 25  1211 25  0809 25  0320   SODIUM 125* 124* 125*   POTASSIUM  --   --  4.2   CHLORIDE  --   --  88*   CO2  --   --  26.0   ANION GAP  --   --  11.0   BUN  --   --  7*   CREATININE  --   --  0.57   EGFR  --   --  102.9   GLUCOSE  --   --  106*   CALCIUM  --   --  8.4*         Lab 25  0320   TOTAL PROTEIN 6.8   ALBUMIN 4.3   GLOBULIN 2.5    ALT (SGPT) 85*   AST (SGOT) 81*   BILIRUBIN 0.3   ALK PHOS 87         Lab 05/19/25  0506 05/19/25  0320   PROBNP  --  36.6   HSTROP T <6 <6                 Lab 05/19/25  0340   PH, ARTERIAL 7.412   PCO2, ARTERIAL 44.1   PO2 ART 76.4*   FIO2 36   HCO3 ART 28.0*   BASE EXCESS ART 2.9*   CARBOXYHEMOGLOBIN 3.2*     Brief Urine Lab Results       None            Microbiology Results Abnormal       Procedure Component Value - Date/Time    Respiratory Panel PCR w/COVID-19(SARS-CoV-2) [facility] In-House, NP Swab in UTM/VTM, 2 HR TAT - Swab, Nasopharynx [[id number]]  (Abnormal) Collected: 05/19/25 0327    Lab Status: Final result Specimen: Swab from Nasopharynx Updated: 05/19/25 0609     ADENOVIRUS, PCR Not Detected     Coronavirus 229E Not Detected     Coronavirus HKU1 Not Detected     Coronavirus NL63 Not Detected     Coronavirus OC43 Not Detected     COVID19 Not Detected     Human Metapneumovirus Not Detected     Human Rhinovirus/Enterovirus Not Detected     Influenza A PCR Not Detected     Influenza B PCR Not Detected     Parainfluenza Virus 1 Not Detected     Parainfluenza Virus 2 Not Detected     Parainfluenza Virus 3 Detected     Parainfluenza Virus 4 Not Detected     RSV, PCR Not Detected     Bordetella pertussis pcr Not Detected     Bordetella parapertussis PCR Not Detected     Chlamydophila pneumoniae PCR Not Detected     Mycoplasma pneumo by PCR Not Detected    Narrative:      In the setting of a positive respiratory panel with a viral infection PLUS a negative procalcitonin without other underlying concern for bacterial infection, consider observing off antibiotics or discontinuation of antibiotics and continue supportive care. If the respiratory panel is positive for atypical bacterial infection (Bordetella pertussis, Chlamydophila pneumoniae, or Mycoplasma pneumoniae), consider antibiotic de-escalation to target atypical bacterial infection.            XR Chest 1 View  Result Date: 5/19/2025  XR  CHEST 1 VW Date of Exam: 5/19/2025 3:11 AM EDT Indication: SOA triage protocol Comparison: None available. Findings: The lungs are hyperexpanded. There is interstitial prominence. There is no pneumothorax, pleural effusion or focal airspace consolidation. Heart size and pulmonary vasculature appear within normal limits. Regional bones appear intact.     Impression: Impression: Hyperexpanded lungs with interstitial prominence. Correlate for COPD. Electronically Signed: Marshall Craig MD  5/19/2025 4:18 AM EDT  Workstation ID: [id number]          Current medications:  Scheduled Meds:arformoterol, 15 mcg, Nebulization, BID - RT   And  budesonide, 0.5 mg, Nebulization, BID - RT   And  revefenacin, 175 mcg, Nebulization, Daily - RT  citalopram, 40 mg, Oral, Daily  doxycycline, 100 mg, Intravenous, BID  enoxaparin sodium, 40 mg, Subcutaneous, Daily  fenofibrate, 48 mg, Oral, Daily  guaiFENesin, 600 mg, Oral, Q12H  levothyroxine, 100 mcg, Oral, QAM  losartan, 50 mg, Oral, Q24H  methylPREDNISolone sodium succinate, 80 mg, Intravenous, Q12H  montelukast, 10 mg, Oral, Nightly  rosuvastatin, 40 mg, Oral, Daily  sodium chloride, 10 mL, Intravenous, Q12H      Continuous Infusions:sodium chloride, 75 mL/hr, Last Rate: 75 mL/hr (05/19/25 1418)      PRN Meds:.  acetaminophen **OR** acetaminophen **OR** acetaminophen    benzonatate    senna-docusate sodium **AND** polyethylene glycol **AND** bisacodyl **AND** bisacodyl    ipratropium-albuterol    melatonin    nitroglycerin    ondansetron ODT **OR** ondansetron    sodium chloride    sodium chloride    sodium chloride    Assessment & Plan   Assessment & Plan     Active Hospital Problems    Diagnosis  POA    **COPD exacerbation [J44.1]  Yes    Hyponatremia [E87.1]  Unknown    Dyslipidemia [E78.5]  Unknown    Parainfluenza virus infection [B34.8]  Unknown    Acute on chronic respiratory failure with hypoxia [J96.21]  Yes    History of cigarette smoking [Z87.891]  Not Applicable    Primary  hypertension [I10]  Yes    Pulmonary emphysema [J43.9]  Yes    Acquired hypothyroidism [E03.9]  Yes      Resolved Hospital Problems   No resolved problems to display.        Brief Hospital Course to date:  Florence Dee is a 64 y.o. female with past medical history significant for COPD, hypothyroidism, hypertension, hyperlipidemia, history of tobacco abuse.  Patient was admitted to the hospital for severe shortness of breath and respiratory failure secondary to parainfluenza infection and also COPD exacerbation.    COPD exacerbation 2/2 parainfluenza viral infection  Ongoing tobacco abuse  Acute respiratory failure secondary to above  -Will get CTA of chest to rule out pulmonary embolism  -Continue neb treatment and also steroids     Hyponatremia  - hold hctz  - also on citalopram, continuing for now  - Will discontinue IV fluid and put the patient on fluid restriction.  Will check sodium levels every 4 hours    Elevated LFT's  - chronically elevated, monitor  - on tricor / statin, continue for now     HTN / HLD  - continue ARB, hold HCTZ r/t low sodium  - continue tricor / statin, monitor LFT's     Hypothyroidism  - continue levothyroxine    Alcohol abuse  -Start on alcohol withdrawal protocol  -Fall precaution     Mood disorder  - continue citalopram, monitor sodium levels      Expected Discharge Location and Transportation: Home  Expected Discharge in 2 to 3 days  Expected Discharge Date: 5/21/2025; Expected Discharge Time:      VTE Prophylaxis:  Pharmacologic VTE prophylaxis orders are present.         AM-PAC 6 Clicks Score (PT): 24 (05/19/25 1003)    CODE STATUS:   Code Status and Medical Interventions: CPR (Attempt to Resuscitate); Full Support   Ordered at: 05/19/25 0610     Code Status (Patient has no pulse and is not breathing):    CPR (Attempt to Resuscitate)     Medical Interventions (Patient has pulse or is breathing):    Full Support       Lenny Corrales MD  05/19/25

## 2025-05-19 NOTE — Clinical Note
Level of Care: Telemetry [5]   Diagnosis: COPD exacerbation [988335]   Admitting Physician: NADER OSWALD [[id number]]   Attending Physician: NADER OSWALD [[id number]]   Is patient appropriate for Inpatient Observation Unit?: Yes [1]

## 2025-05-19 NOTE — ED NOTES
Florence Dee    Nursing Report ED to Floor:  Mental status: aox4  Ambulatory status: standby, quickly gets SOA  Oxygen Therapy:  4l nc  Cardiac Rhythm: SR  Admitted from: home  Safety Concerns:  none  Precautions: none  Social Issues: none  ED Room #:  16    ED Nurse Phone Extension - [phone] or may call [phone].      HPI:   Chief Complaint   Patient presents with    Shortness of Breath       Past Medical History:  Past Medical History:   Diagnosis Date    Disease of thyroid gland     Graves disease     Hypertension         Past Surgical History:  Past Surgical History:   Procedure Laterality Date    ORBITAL DECOMPRESSION          Admitting Doctor:   Pedro Luis Lauren DO    Consulting Provider(s):  Consults       No orders found from 4/20/2025 to 5/20/2025.             Admitting Diagnosis:   The primary encounter diagnosis was Acute respiratory failure with hypoxia. Diagnoses of Atypical pneumonia, COPD exacerbation, Primary hypertension, and Transaminitis were also pertinent to this visit.    Most Recent Vitals:   Vitals:    05/19/25 0430 05/19/25 0446 05/19/25 0500 05/19/25 0530   BP: 136/80  148/88 129/84   BP Location:       Patient Position:       Pulse: 94  97 104   Resp:  20     Temp:       TempSrc:       SpO2: 98%  97% 95%   Weight:       Height:           Active LDAs/IV Access:   Lines, Drains & Airways       Active LDAs       Name Placement date Placement time Site Days    Peripheral IV 05/19/25 0306 20 G Right Antecubital 05/19/25  0306  Antecubital  less than 1                    Labs (abnormal labs have a star):   Labs Reviewed   COMPREHENSIVE METABOLIC PANEL - Abnormal; Notable for the following components:       Result Value    Glucose 106 (*)     BUN 7 (*)     Sodium 125 (*)     Chloride 88 (*)     Calcium 8.4 (*)     ALT (SGPT) 85 (*)     AST (SGOT) 81 (*)     All other components within normal limits    Narrative:     GFR Categories in Chronic Kidney Disease (CKD)              GFR Category           GFR (mL/min/1.73)    Interpretation  G1                    90 or greater        Normal or high (1)  G2                    60-89                Mild decrease (1)  G3a                   45-59                Mild to moderate decrease  G3b                   30-44                Moderate to severe decrease  G4                    15-29                Severe decrease  G5                    14 or less           Kidney failure    (1)In the absence of evidence of kidney disease, neither GFR category G1 or G2 fulfill the criteria for CKD.    eGFR calculation 2021 CKD-EPI creatinine equation, which does not include race as a factor   CBC WITH AUTO DIFFERENTIAL - Abnormal; Notable for the following components:    .0 (*)     MCH 33.3 (*)     Neutrophil % 84.1 (*)     Lymphocyte % 7.7 (*)     Immature Grans % 0.6 (*)     Lymphocytes, Absolute 0.55 (*)     All other components within normal limits   BLOOD GAS, ARTERIAL W/CO-OXIMETRY - Abnormal; Notable for the following components:    pO2, Arterial 76.4 (*)     HCO3, Arterial 28.0 (*)     Base Excess, Arterial 2.9 (*)     Oxyhemoglobin 92.6 (*)     Carboxyhemoglobin 3.2 (*)     pO2, Temperature Corrected 76.4 (*)     All other components within normal limits   BNP (IN-HOUSE) - Normal    Narrative:     This assay is used as an aid in the diagnosis of individuals suspected of having heart failure. It can be used as an aid in the diagnosis of acute decompensated heart failure (ADHF) in patients presenting with signs and symptoms of ADHF to the emergency department (ED). In addition, NT-proBNP of <300 pg/mL indicates ADHF is not likely.    Age Range Result Interpretation  NT-proBNP Concentration (pg/mL:      <50             Positive            >450                   Gray                 300-450                    Negative             <300    50-75           Positive            >900                  Gray                300-900                  Negative            <300      >75              Positive            >1800                  Gray                300-1800                  Negative            <300   TROPONIN - Normal    Narrative:     High Sensitive Troponin T Reference Range:  <14.0 ng/L- Negative Female for AMI  <22.0 ng/L- Negative Male for AMI  >=14 - Abnormal Female indicating possible myocardial injury.  >=22 - Abnormal Male indicating possible myocardial injury.   Clinicians would have to utilize clinical acumen, EKG, Troponin, and serial changes to determine if it is an Acute Myocardial Infarction or myocardial injury due to an underlying chronic condition.        LACTIC ACID, PLASMA - Normal   RESPIRATORY PANEL PCR W/ COVID-19 (SARS-COV-2), NP SWAB IN UTM/VTP, 2 HR TAT   BLOOD CULTURE   RAINBOW DRAW    Narrative:     The following orders were created for panel order Roy Draw.  Procedure                               Abnormality         Status                     ---------                               -----------         ------                     Green Top (Gel)[[id number]]                                  Final result               Lavender Top[[id number]]                                     Final result               Gold Top - SST[[id number]]                                   Final result               Landry Top[[id number]]                                         Final result               Light Blue Top[[id number]]                                   Final result                 Please view results for these tests on the individual orders.   BLOOD GAS, ARTERIAL   HIGH SENSITIVITIY TROPONIN T 1HR    Narrative:     High Sensitive Troponin T Reference Range:  <14.0 ng/L- Negative Female for AMI  <22.0 ng/L- Negative Male for AMI  >=14 - Abnormal Female indicating possible myocardial injury.  >=22 - Abnormal Male indicating possible myocardial injury.   Clinicians would have to utilize clinical acumen, EKG, Troponin, and serial changes to determine if it is an Acute Myocardial  Infarction or myocardial injury due to an underlying chronic condition.        CBC AND DIFFERENTIAL    Narrative:     The following orders were created for panel order CBC & Differential.  Procedure                               Abnormality         Status                     ---------                               -----------         ------                     CBC Auto Differential[[id number]]        Abnormal            Final result                 Please view results for these tests on the individual orders.   GREEN TOP   LAVENDER TOP   GOLD TOP - SST   GRAY TOP   LIGHT BLUE TOP       Meds Given in ED:   Medications   sodium chloride 0.9 % flush 10 mL (has no administration in time range)   doxycycline (VIBRAMYCIN) 100 mg in sodium chloride 0.9 % 100 mL MBP (has no administration in time range)   ipratropium-albuterol (DUO-NEB) nebulizer solution 3 mL (3 mL Nebulization Given 5/19/25 0329)   methylPREDNISolone sodium succinate (SOLU-Medrol) injection 125 mg (125 mg Intravenous Given 5/19/25 0321)   ipratropium-albuterol (DUO-NEB) nebulizer solution 3 mL (3 mL Nebulization Given 5/19/25 0446)   magnesium sulfate in D5W 1g/100mL (PREMIX) (1 g Intravenous New Bag 5/19/25 0500)   acetaminophen (TYLENOL) tablet 1,000 mg (1,000 mg Oral Given 5/19/25 0459)           Last NIH score:                                                          Dysphagia screening results:  Patient Factors Component (Dysphagia:Stroke or Rule-out)  Best Eye Response: 4-->(E4) spontaneous (05/19/25 0303)  Best Motor Response: 6-->(M6) obeys commands (05/19/25 0303)  Best Verbal Response: 5-->(V5) oriented (05/19/25 0303)  [place] Coma Scale Score: 15 (05/19/25 0303)     [place] Coma Scale:  No data recorded     CIWA:        Restraint Type:            Isolation Status:  No active isolations

## 2025-05-19 NOTE — H&P
[facility] Medicine Services  HISTORY AND PHYSICAL    Patient Name: Florence Dee  : 1960  MRN: [id number]  Primary Care Physician: Joann Hankins DO  Date of admission: 2025    Subjective   Subjective     Chief Complaint:  Shortness of breath    HPI:  Florence Dee is a 64 y.o. female with PMHx of emphysema, hypothyroidism, HTN, HLD and prior tobacco use who presents to the ED for evaluation of shortness of breath. Symptoms began Saturday with the change in weather (this is a trigger for her typically), slept with the window open and developed congested cough, wheezing and shortness of breath. Has been using neb treatments at home and felt better during the day today but tonight felt worse again so presents to the ED. Denied fever / chills at home. No known sick contacts.    Arrived in the ED requiring NRB mask, has been weaned down to 5 liters NC. Does not wear home oxygen. Had temp 100.4 as well. Respiratory PCR is positive for parainfluenza virus. Chest xray w/ hyperexpanded lungs w/ interstitial prominence. She is hypoxic on ABG w/ pO2 76.4. Sodium 125, she is on Hctz and citalopram. AST / ALT elevated but this appears chronic. She will be admitted to hospital medicine for further management.    Review of Systems   Constitutional:  Positive for activity change, appetite change and fatigue. Negative for chills and fever.   HENT:  Positive for congestion. Negative for trouble swallowing.    Respiratory:  Positive for cough, shortness of breath and wheezing.    Cardiovascular: Negative.    Psychiatric/Behavioral:  Positive for sleep disturbance.    All other systems reviewed and are negative.           Personal History     Past Medical History:   Diagnosis Date    Disease of thyroid gland     Graves disease     Hypertension              Past Surgical History:   Procedure Laterality Date    ORBITAL DECOMPRESSION         Family History:   family history includes  Hypertension in her father and mother; Stroke in her father and mother.     Social History:  reports that she quit smoking about 5 years ago. Her smoking use included cigarettes. She started smoking about 30 years ago. She has a 12.5 pack-year smoking history. She has never used smokeless tobacco. She reports current alcohol use of about 5.0 - 6.0 standard drinks of alcohol per week. She reports that she does not use drugs.  Social History     Social History Narrative    Not on file       Medications:  albuterol, albuterol sulfate HFA, citalopram, fenofibrate, levothyroxine, montelukast, olmesartan-hydrochlorothiazide, and rosuvastatin    No Known Allergies    Objective   Objective     Vital Signs:   Temp:  [100.4 °F (38 °C)] 100.4 °F (38 °C)  Heart Rate:  [] 93  Resp:  [18-20] 20  BP: (116-149)/(71-89) 116/71  Flow (L/min) (Oxygen Therapy):  [5-15] 5    Physical Exam  Vitals reviewed.   Constitutional:       General: She is not in acute distress.     Appearance: She is well-developed.   HENT:      Head: Normocephalic and atraumatic.      Nose: Nose normal.      Mouth/Throat:      Pharynx: Oropharynx is clear.   Eyes:      Extraocular Movements: Extraocular movements intact.      Conjunctiva/sclera: Conjunctivae normal.      Pupils: Pupils are equal, round, and reactive to light.   Cardiovascular:      Rate and Rhythm: Normal rate and regular rhythm.      Pulses: Normal pulses.      Heart sounds: Normal heart sounds. No murmur heard.  Pulmonary:      Effort: Tachypnea, accessory muscle usage and respiratory distress present.      Breath sounds: Wheezing present.   Abdominal:      General: Bowel sounds are normal. There is no distension.      Palpations: Abdomen is soft.      Tenderness: There is no abdominal tenderness.   Musculoskeletal:         General: No swelling. Normal range of motion.      Cervical back: Normal range of motion and neck supple.   Skin:     General: Skin is warm and dry.      Capillary  Refill: Capillary refill takes less than 2 seconds.      Comments: Flushed appearance     Neurological:      Mental Status: She is alert and oriented to person, place, and time.      Cranial Nerves: No cranial nerve deficit.   Psychiatric:         Mood and Affect: Mood normal.         Behavior: Behavior normal.             Result Review:  I have personally reviewed the results from the time of this admission to 5/19/2025 06:26 EDT and agree with these findings:  [x]  Laboratory list / accordion  [x]  Microbiology  [x]  Radiology  [x]  EKG/Telemetry   []  Cardiology/Vascular   []  Pathology  []  Old records  []  Other:  Most notable findings include:      LAB RESULTS:      Lab 05/19/25  0320   WBC 7.14   HEMOGLOBIN 14.4   HEMATOCRIT 43.2   PLATELETS 254   NEUTROS ABS 6.01   IMMATURE GRANS (ABS) 0.04   LYMPHS ABS 0.55*   MONOS ABS 0.45   EOS ABS 0.07   .0*   LACTATE 1.3         Lab 05/19/25  0320   SODIUM 125*   POTASSIUM 4.2   CHLORIDE 88*   CO2 26.0   ANION GAP 11.0   BUN 7*   CREATININE 0.57   EGFR 102.9   GLUCOSE 106*   CALCIUM 8.4*         Lab 05/19/25  0320   TOTAL PROTEIN 6.8   ALBUMIN 4.3   GLOBULIN 2.5   ALT (SGPT) 85*   AST (SGOT) 81*   BILIRUBIN 0.3   ALK PHOS 87         Lab 05/19/25  0506 05/19/25  0320   PROBNP  --  36.6   HSTROP T <6 <6                 Lab 05/19/25  0340   PH, ARTERIAL 7.412   PCO2, ARTERIAL 44.1   PO2 ART 76.4*   FIO2 36   HCO3 ART 28.0*   BASE EXCESS ART 2.9*   CARBOXYHEMOGLOBIN 3.2*     Brief Urine Lab Results       None          Microbiology Results (last 10 days)       Procedure Component Value - Date/Time    Respiratory Panel PCR w/COVID-19(SARS-CoV-2) [facility]/WOODY/[facility] In-House, NP Swab in UTM/VTM, 2 HR TAT - Swab, Nasopharynx [[id number]]  (Abnormal) Collected: 05/19/25 0327    Lab Status: Final result Specimen: Swab from Nasopharynx Updated: 05/19/25 0609     ADENOVIRUS, PCR Not Detected     Coronavirus 229E Not Detected     Coronavirus HKU1 Not Detected      Coronavirus NL63 Not Detected     Coronavirus OC43 Not Detected     COVID19 Not Detected     Human Metapneumovirus Not Detected     Human Rhinovirus/Enterovirus Not Detected     Influenza A PCR Not Detected     Influenza B PCR Not Detected     Parainfluenza Virus 1 Not Detected     Parainfluenza Virus 2 Not Detected     Parainfluenza Virus 3 Detected     Parainfluenza Virus 4 Not Detected     RSV, PCR Not Detected     Bordetella pertussis pcr Not Detected     Bordetella parapertussis PCR Not Detected     Chlamydophila pneumoniae PCR Not Detected     Mycoplasma pneumo by PCR Not Detected    Narrative:      In the setting of a positive respiratory panel with a viral infection PLUS a negative procalcitonin without other underlying concern for bacterial infection, consider observing off antibiotics or discontinuation of antibiotics and continue supportive care. If the respiratory panel is positive for atypical bacterial infection (Bordetella pertussis, Chlamydophila pneumoniae, or Mycoplasma pneumoniae), consider antibiotic de-escalation to target atypical bacterial infection.            XR Chest 1 View  Result Date: 5/19/2025  XR CHEST 1 VW Date of Exam: 5/19/2025 3:11 AM EDT Indication: SOA triage protocol Comparison: None available. Findings: The lungs are hyperexpanded. There is interstitial prominence. There is no pneumothorax, pleural effusion or focal airspace consolidation. Heart size and pulmonary vasculature appear within normal limits. Regional bones appear intact.     Impression: Impression: Hyperexpanded lungs with interstitial prominence. Correlate for COPD. Electronically Signed: Marshall Craig MD  5/19/2025 4:18 AM EDT  Workstation ID: [id number]          Assessment & Plan   Assessment & Plan       COPD exacerbation    Acquired hypothyroidism    History of cigarette smoking    Primary hypertension    Pulmonary emphysema    Hyponatremia    Dyslipidemia    Parainfluenza virus infection    COPD exacerbation /  parainfluenza viral infection  Chronic pulmonary emphysema / Hx smoking  - hypoxic, requiring 5 liters NC w/ increased work of breathing  - d dimer pending, if elevated obtain CTA chest to r/o PE  - respiratory pcr positive for parainfluenza  - continue doxycycline, started in ED  - tre nebs scheduled, duo nebs prn  - methylprednisolone 125 mg in ED, continue 62.5 mg daily x 5 days  - pulmonary toilet / IS / flutter valve  - add tessalon PRN, mucinex BID  - sputum culture ordered  - wean oxygen as tolerated    Hyponatremia  - hold hctz  - also on citalopram, continuing for now  - check urine sodium, urine osmolality, serum osmolality  - gentle fluids  - repeat sodium Q 4 hours    Elevated LFT's  - chronically elevated, monitor  - on tricor / statin, continue for now    HTN / HLD  - continue ARB, hold HCTZ r/t low sodium  - continue tricor / statin, monitor LFT's    Hypothyroidism  - continue levothyroxine    Mood disorder  - continue citalopram, monitor sodium levels        DVT prophylaxis:  Lovenox    CODE STATUS:     Code Status (Patient has no pulse and is not breathing): CPR (Attempt to Resuscitate)  Medical Interventions (Patient has pulse or is breathing): Full Support      Expected Discharge     Expected Discharge Date: 5/21/2025; Expected Discharge Time:       Signature: Electronically signed by OSITO Trevino, 05/19/25, 6:26 AM EDT      Total time spent: 50 minutes  Time spent includes time reviewing chart, face-to-face time, counseling patient/family/caregiver, ordering medications/tests/procedures, communicating with other health care professionals, documenting clinical information in the electronic health record, and coordination of care.

## 2025-05-19 NOTE — CASE MANAGEMENT/SOCIAL WORK
Discharge Planning Assessment  [facility]     Patient Name: Florence Dee  MRN: [id number]  Today's Date: 5/19/2025    Admit Date: 5/19/2025    Plan: Home   Discharge Needs Assessment       Row Name 05/19/25 1217       Living Environment    People in Home spouse    Current Living Arrangements home    Primary Care Provided by self    Provides Primary Care For no one    Family Caregiver if Needed spouse    Family Caregiver Names Remi Dee,  [phone]    Able to Return to Prior Arrangements yes       Transition Planning    Patient/Family Anticipates Transition to home with family    Patient/Family Anticipated Services at Transition     Transportation Anticipated family or friend will provide       Discharge Needs Assessment    Equipment Currently Used at Home nebulizer    Concerns to be Addressed denies needs/concerns at this time    Discharge Coordination/Progress Lives in a house in [place] with her  Remi, independent with ADLs.  Only DME is a nebulizer machine. No history of home health and no advanced directives.                   Discharge Plan       Row Name 05/19/25 1218       Plan    Plan Home    Patient/Family in Agreement with Plan yes    Plan Comments I spoke with Ms Dee at bedside.  Ms Dee confirmed that she resides at the address on file in a two story house in [place] with her .  There are no stairs to enter the house.  Ms Dee is independent with ADLs.  Her only DME is a nebulizer machine, and she does not remember which company provided the machine.  She has not had home health in the past and does not have any advanced directives.  Her insurance is [employer], and she has prescription coverage.  Her PCP is Joann Hankins, and she gets her prescriptions filled at [facility] off of [facility].  At this time she denies any discharge needs, but is currently on oxygen with none at home. Her  can transport her home at discharge.     Final Discharge Disposition Code 01 - home or self-care                    Expected Discharge Date and Time       Expected Discharge Date Expected Discharge Time    May 21, 2025            Demographic Summary    No documentation.                  Functional Status       Row Name 05/19/25 1216       Functional Status    Usual Activity Tolerance good    Current Activity Tolerance good       Functional Status, IADL    Medications independent    Meal Preparation independent    Housekeeping independent    Laundry independent    Shopping independent       Mental Status    General Appearance WDL WDL                   Psychosocial    No documentation.                  Abuse/Neglect    No documentation.                  Legal    No documentation.                  Substance Abuse    No documentation.                  Patient Forms    No documentation.                     Samantha Steiner RN

## 2025-05-19 NOTE — ED PROVIDER NOTES
Subjective   History of Present Illness  This is a 64-year-old female with a history of hypertension and asthma presenting to the emergency department with some shortness of breath.  Patient has been having troubles breathing over the last few days.  States that they intensified last night.  She felt she could not catch her breath.  She has had a pretty significant cough as well.  Productive of some white sputum.  Denies any hemoptysis.  She has had diffuse wheezing.  Denies any fevers or chills.  No headache or change in vision.  No focal weakness.    History provided by:  Patient and EMS personnel   used: No        Review of Systems   Constitutional:  Negative for chills and fever.   HENT:  Negative for congestion, ear pain and sore throat.    Eyes:  Negative for visual disturbance.   Respiratory:  Positive for cough, shortness of breath and wheezing.    Cardiovascular:  Negative for chest pain.   Gastrointestinal:  Negative for abdominal pain.   Genitourinary:  Negative for difficulty urinating.   Musculoskeletal:  Negative for arthralgias.   Skin:  Negative for rash.   Neurological:  Negative for dizziness, weakness and numbness.   Psychiatric/Behavioral:  Negative for agitation.        Past Medical History:   Diagnosis Date    Disease of thyroid gland     Graves disease     Hypertension        No Known Allergies    Past Surgical History:   Procedure Laterality Date    ORBITAL DECOMPRESSION         Family History   Problem Relation Age of Onset    Hypertension Mother     Stroke Mother     Hypertension Father     Stroke Father     Breast cancer Neg Hx     Ovarian cancer Neg Hx        Social History     Socioeconomic History    Marital status:    Tobacco Use    Smoking status: Former     Current packs/day: 0.00     Average packs/day: 0.5 packs/day for 25.0 years (12.5 ttl pk-yrs)     Types: Cigarettes     Start date: 1994     Quit date: 2019     Years since quittin.3     Smokeless tobacco: Never   Vaping Use    Vaping status: Never Used   Substance and Sexual Activity    Alcohol use: Yes     Alcohol/week: 5.0 - 6.0 standard drinks of alcohol     Types: 5 - 6 Glasses of wine per week    Drug use: No    Sexual activity: Defer           Objective   Physical Exam  Vitals and nursing note reviewed.   Constitutional:       General: She is in acute distress.      Appearance: She is ill-appearing.   HENT:      Mouth/Throat:      Pharynx: No posterior oropharyngeal erythema.   Eyes:      Conjunctiva/sclera: Conjunctivae normal.      Pupils: Pupils are equal, round, and reactive to light.   Cardiovascular:      Rate and Rhythm: Regular rhythm. Tachycardia present.   Pulmonary:      Effort: Tachypnea and respiratory distress present.      Breath sounds: Wheezing present.   Abdominal:      General: Abdomen is flat. There is no distension.      Palpations: There is no mass.      Tenderness: There is no abdominal tenderness. There is no guarding or rebound.   Musculoskeletal:         General: No deformity. Normal range of motion.   Skin:     General: Skin is warm.      Findings: No rash.   Neurological:      General: No focal deficit present.      Mental Status: She is alert and oriented to person, place, and time.      Motor: No weakness.         ECG 12 Lead      Date/Time: 5/19/2025 4:30 AM    Performed by: Ramy Thao MD  Authorized by: Ramy Thao MD  Interpreted by ED physician  Comparison: compared with previous ECG   Similar to previous ECG  Rhythm: sinus rhythm  Rate: normal  BPM: 94  QRS axis: normal  Conduction: conduction normal  Other findings comments: Nonspecific ST changes  Clinical impression: non-specific ECG  Comments: Interpretation:  EKG was directly visualized by myself, interpretations as documented in hospital course.               ED Course  ED Course as of 05/19/25 0552   Mon May 19, 2025   0432 BP: 137/87 [JK]   0432 Heart Rate: 96 [JK]   0432 Resp: 20  [JK]   0432 SpO2: 100 % [JK]   0432 Device (Oxygen Therapy): nonrebreather mask [JK]   0437 Interpretation:  Patient's repeat vitals, telemetry tracing, and pulse oximetry tracing were directly viewed and interpreted by myself.   O2 sat 100% on nonrebreather, interpreted as acute hypoxia.  Telemetry rhythm strip revealed a rate of 96 bpm, interpreted as normal sinus rhythm [JK]   0437 Blood Gas, Arterial With Co-Ox(!)  Interpretation:  Patient's Blood Gas was directly viewed and interpreted by myself.   Acute hypoxia [JK]   0550 CBC & Differential(!) [JK]   0550 Lactic Acid, Plasma [JK]   0550 High Sensitivity Troponin T [JK]   0550 BNP [JK]   0550 Comprehensive Metabolic Panel(!)  Interpretation:  Laboratory studies were reviewed and interpreted directly by myself.  CBC was unremarkable, CMP showed some chronic elevation ALT at 85 and AST at 81, lactate normal, troponin normal, BNP normal [JK]   0550 XR Chest 1 View  Interpretation:  Imaging was directly visualized by myself, per my interpretations, chest x-ray showed chronic emphysematous changes.  [JK]   0551 Patient required additional DuoNeb therapy for persistent wheezing.  She continues to require supplemental oxygen. [JK]   0551 Patient was also administered broad-spectrum antibiotics, IV magnesium and IV steroids [JK]   0551 Findings consistent with acute respiratory failure secondary to atypical pneumonia.  Patient will be continued on respiratory support.  Admitted to hospital for further evaluation and treatment [JK]   0551 Based on the patient's presentation, history and diffuse work-up in the emergency department, the patient is deemed appropriate for admission to the hospital for further evaluation and treatment.  This was discussed with the patient at bedside.  They are in agreement with the current medical management.    Admitting physician: Dr. Lauren    Discussion was had with admitting physician regarding the laboratory and imaging findings.  We  did discuss current therapeutics in the emergency department and progression of the patient.  Working diagnosis was conveyed to the admitting physician, as well as current status and prognosis for the patient.  They are in agreement with these findings and have accepted admission.    Shared decision making:   After full review of the patient's clinical presentation, review of any work-up including but not limited to laboratory studies and radiology obtained, I had a discussion with the patient.  Treatment options were discussed as well as the risks, benefits and consequences.  I discussed all findings with the patient and family members if available.  During the discussion, treatment goals were understood by all as well as any misconceptions which were addressed with the patient.  Ample time was given for any questions they may have had.  They are in agreement with the treatment plan as well as final disposition. [JK]      ED Course User Index  [JK] Ramy Thao MD                                                       Medical Decision Making  This is a 64-year-old female with a history of restrictive lung disease presenting to the emergency department with shortness of breath.  Patient appears to be in mild respiratory distress on presentation.  She is hypoxic on room air, has a low-grade fever.  Findings are concerning for acute pneumonia and respiratory failure.  Patient's overall presentation is deemed critical.  Given the initial presentation and past medical history, the patient has a high risk of serious morbidity and mortality.  Patient was immediately transferred to the resuscitation bay.  Bilateral IV access was established in the patient.  Placed on continuous telemetry monitoring.  Supplied supplemental oxygen. Differential is broad and will require extensive treatment and evaluation.  Workup initiated.      Differential diagnosis: Acute respiratory failure, chronic lung disease, pneumonia,  bronchitis, viral syndrome, acute kidney injury, electrolyte abnormality, anemia      Problems Addressed:  Acute respiratory failure with hypoxia: complicated acute illness or injury  Atypical pneumonia: complicated acute illness or injury  COPD exacerbation: complicated acute illness or injury  Primary hypertension: complicated acute illness or injury  Transaminitis: complicated acute illness or injury    Amount and/or Complexity of Data Reviewed  Independent Historian: EMS  External Data Reviewed: labs, radiology, ECG and notes.     Details: External laboratories, imaging as well as notes were reviewed personally by myself.  All relevant studies were used to guide decision making.     Date of previous record: 10/17/2022    Source of note: Primary physician    Summary:  Patient was seen and evaluated for routine visit.  I did review basic laboratory studies on file as well as a previous chest x-ray and EKG.  Records reviewed    Labs: ordered. Decision-making details documented in ED Course.  Radiology: ordered and independent interpretation performed. Decision-making details documented in ED Course.  ECG/medicine tests: ordered and independent interpretation performed. Decision-making details documented in ED Course.    Risk  OTC drugs.  Prescription drug management.  Decision regarding hospitalization.    Critical Care  Total time providing critical care: 40 minutes (Authorized and performed by: Ramy Thao MD  I personally spent a total of 40 minutes of critical care time with the patient.  Due to the high probability of clinically significant, life-threatening deterioration, the patient required my highest level of care to intervene emergently.  These interventions, including, but not limited to, establishing IV access, continuous pulse oximeter and telemetry monitoring, frequent monitoring and reevaluations, management the patient's airway and cardiovascular system, discussion with other consultants as  needed, which bear directly on the management the patient.  This also includes obtaining history, examining the patient, frequent reevaluations and coordinating high level of care.  Failure to emergently initiate these interventions would carry a high probability of resulting in sudden, clinically significant or life threatening deterioration in the patient's condition.  This does not include time spent on separately reported billable procedures.)        Final diagnoses:   Acute respiratory failure with hypoxia   Atypical pneumonia   COPD exacerbation   Primary hypertension   Transaminitis       ED Disposition  ED Disposition       ED Disposition   Decision to Admit    Condition   --    Comment   Level of Care: Telemetry [5]   Diagnosis: COPD exacerbation [967788]   Admitting Physician: NADER OSWALD [[id number]]   Attending Physician: NADER OSWALD [[id number]]                 No follow-up provider specified.       Medication List      No changes were made to your prescriptions during this visit.            Ramy Thao MD  05/19/25 0571

## 2025-05-20 ENCOUNTER — APPOINTMENT (OUTPATIENT)
Dept: CT IMAGING | Facility: HOSPITAL | Age: 65
DRG: 193 | End: 2025-05-20
Payer: COMMERCIAL

## 2025-05-20 LAB
ALBUMIN SERPL-MCNC: 3.8 G/DL (ref 3.5–5.2)
ALBUMIN/GLOB SERPL: 1.7 G/DL
ALP SERPL-CCNC: 65 U/L (ref 39–117)
ALT SERPL W P-5'-P-CCNC: 51 U/L (ref 1–33)
ANION GAP SERPL CALCULATED.3IONS-SCNC: 10 MMOL/L (ref 5–15)
AST SERPL-CCNC: 38 U/L (ref 1–32)
BASOPHILS # BLD AUTO: 0.01 10*3/MM3 (ref 0–0.2)
BASOPHILS NFR BLD AUTO: 0.1 % (ref 0–1.5)
BILIRUB SERPL-MCNC: 0.3 MG/DL (ref 0–1.2)
BUN SERPL-MCNC: 10 MG/DL (ref 8–23)
BUN/CREAT SERPL: 19.2 (ref 7–25)
CALCIUM SPEC-SCNC: 8.6 MG/DL (ref 8.6–10.5)
CHLORIDE SERPL-SCNC: 89 MMOL/L (ref 98–107)
CO2 SERPL-SCNC: 26 MMOL/L (ref 22–29)
CREAT SERPL-MCNC: 0.52 MG/DL (ref 0.57–1)
DEPRECATED RDW RBC AUTO: 45.7 FL (ref 37–54)
EGFRCR SERPLBLD CKD-EPI 2021: 105.2 ML/MIN/1.73
EOSINOPHIL # BLD AUTO: 0 10*3/MM3 (ref 0–0.4)
EOSINOPHIL NFR BLD AUTO: 0 % (ref 0.3–6.2)
ERYTHROCYTE [DISTWIDTH] IN BLOOD BY AUTOMATED COUNT: 12.5 % (ref 12.3–15.4)
GLOBULIN UR ELPH-MCNC: 2.3 GM/DL
GLUCOSE SERPL-MCNC: 145 MG/DL (ref 65–99)
HCT VFR BLD AUTO: 38.7 % (ref 34–46.6)
HGB BLD-MCNC: 13.1 G/DL (ref 12–15.9)
IMM GRANULOCYTES # BLD AUTO: 0.05 10*3/MM3 (ref 0–0.05)
IMM GRANULOCYTES NFR BLD AUTO: 0.5 % (ref 0–0.5)
LYMPHOCYTES # BLD AUTO: 0.5 10*3/MM3 (ref 0.7–3.1)
LYMPHOCYTES NFR BLD AUTO: 4.8 % (ref 19.6–45.3)
MAGNESIUM SERPL-MCNC: 2 MG/DL (ref 1.6–2.4)
MCH RBC QN AUTO: 33.5 PG (ref 26.6–33)
MCHC RBC AUTO-ENTMCNC: 33.9 G/DL (ref 31.5–35.7)
MCV RBC AUTO: 99 FL (ref 79–97)
MONOCYTES # BLD AUTO: 0.58 10*3/MM3 (ref 0.1–0.9)
MONOCYTES NFR BLD AUTO: 5.5 % (ref 5–12)
NEUTROPHILS NFR BLD AUTO: 89.1 % (ref 42.7–76)
NEUTROPHILS NFR BLD AUTO: 9.37 10*3/MM3 (ref 1.7–7)
NRBC BLD AUTO-RTO: 0 /100 WBC (ref 0–0.2)
PLATELET # BLD AUTO: 236 10*3/MM3 (ref 140–450)
PMV BLD AUTO: 8.5 FL (ref 6–12)
POTASSIUM SERPL-SCNC: 4.3 MMOL/L (ref 3.5–5.2)
PROT SERPL-MCNC: 6.1 G/DL (ref 6–8.5)
RBC # BLD AUTO: 3.91 10*6/MM3 (ref 3.77–5.28)
SODIUM SERPL-SCNC: 124 MMOL/L (ref 136–145)
SODIUM SERPL-SCNC: 125 MMOL/L (ref 136–145)
SODIUM SERPL-SCNC: 125 MMOL/L (ref 136–145)
SODIUM SERPL-SCNC: 126 MMOL/L (ref 136–145)
SODIUM SERPL-SCNC: 127 MMOL/L (ref 136–145)
WBC NRBC COR # BLD AUTO: 10.51 10*3/MM3 (ref 3.4–10.8)

## 2025-05-20 PROCEDURE — 71275 CT ANGIOGRAPHY CHEST: CPT

## 2025-05-20 PROCEDURE — 99232 SBSQ HOSP IP/OBS MODERATE 35: CPT | Performed by: STUDENT IN AN ORGANIZED HEALTH CARE EDUCATION/TRAINING PROGRAM

## 2025-05-20 PROCEDURE — 94799 UNLISTED PULMONARY SVC/PX: CPT

## 2025-05-20 PROCEDURE — 80053 COMPREHEN METABOLIC PANEL: CPT | Performed by: NURSE PRACTITIONER

## 2025-05-20 PROCEDURE — 25010000002 METHYLPREDNISOLONE PER 125 MG: Performed by: INTERNAL MEDICINE

## 2025-05-20 PROCEDURE — 85025 COMPLETE CBC W/AUTO DIFF WBC: CPT | Performed by: NURSE PRACTITIONER

## 2025-05-20 PROCEDURE — 94761 N-INVAS EAR/PLS OXIMETRY MLT: CPT

## 2025-05-20 PROCEDURE — 84295 ASSAY OF SERUM SODIUM: CPT | Performed by: NURSE PRACTITIONER

## 2025-05-20 PROCEDURE — 83735 ASSAY OF MAGNESIUM: CPT | Performed by: NURSE PRACTITIONER

## 2025-05-20 PROCEDURE — G0378 HOSPITAL OBSERVATION PER HR: HCPCS

## 2025-05-20 PROCEDURE — 25010000002 CEFTRIAXONE PER 250 MG: Performed by: STUDENT IN AN ORGANIZED HEALTH CARE EDUCATION/TRAINING PROGRAM

## 2025-05-20 PROCEDURE — 25010000002 THIAMINE HCL 200 MG/2ML SOLUTION: Performed by: INTERNAL MEDICINE

## 2025-05-20 PROCEDURE — 25010000002 ENOXAPARIN PER 10 MG: Performed by: NURSE PRACTITIONER

## 2025-05-20 PROCEDURE — 25510000001 IOPAMIDOL PER 1 ML: Performed by: INTERNAL MEDICINE

## 2025-05-20 PROCEDURE — 25810000003 SODIUM CHLORIDE 0.9 % SOLUTION: Performed by: STUDENT IN AN ORGANIZED HEALTH CARE EDUCATION/TRAINING PROGRAM

## 2025-05-20 PROCEDURE — 25010000002 DOXYCYCLINE 100 MG RECONSTITUTED SOLUTION 1 EACH VIAL: Performed by: NURSE PRACTITIONER

## 2025-05-20 PROCEDURE — 94664 DEMO&/EVAL PT USE INHALER: CPT

## 2025-05-20 RX ORDER — SODIUM CHLORIDE 9 MG/ML
100 INJECTION, SOLUTION INTRAVENOUS CONTINUOUS
Status: DISCONTINUED | OUTPATIENT
Start: 2025-05-20 | End: 2025-05-20

## 2025-05-20 RX ORDER — IOPAMIDOL 755 MG/ML
75 INJECTION, SOLUTION INTRAVASCULAR
Status: COMPLETED | OUTPATIENT
Start: 2025-05-20 | End: 2025-05-20

## 2025-05-20 RX ORDER — SODIUM CHLORIDE 9 MG/ML
100 INJECTION, SOLUTION INTRAVENOUS CONTINUOUS
Status: DISCONTINUED | OUTPATIENT
Start: 2025-05-20 | End: 2025-05-21

## 2025-05-20 RX ORDER — IPRATROPIUM BROMIDE AND ALBUTEROL SULFATE 2.5; .5 MG/3ML; MG/3ML
3 SOLUTION RESPIRATORY (INHALATION)
Status: DISCONTINUED | OUTPATIENT
Start: 2025-05-20 | End: 2025-05-22

## 2025-05-20 RX ADMIN — ARFORMOTEROL TARTRATE 15 MCG: 15 SOLUTION RESPIRATORY (INHALATION) at 08:59

## 2025-05-20 RX ADMIN — ARFORMOTEROL TARTRATE 15 MCG: 15 SOLUTION RESPIRATORY (INHALATION) at 21:30

## 2025-05-20 RX ADMIN — IPRATROPIUM BROMIDE AND ALBUTEROL SULFATE 3 ML: 2.5; .5 SOLUTION RESPIRATORY (INHALATION) at 08:59

## 2025-05-20 RX ADMIN — BUDESONIDE 0.5 MG: 0.5 SUSPENSION RESPIRATORY (INHALATION) at 21:30

## 2025-05-20 RX ADMIN — FENOFIBRATE 48 MG: 48 TABLET ORAL at 08:44

## 2025-05-20 RX ADMIN — SODIUM CHLORIDE 1000 MG: 900 INJECTION INTRAVENOUS at 10:07

## 2025-05-20 RX ADMIN — IPRATROPIUM BROMIDE AND ALBUTEROL SULFATE 3 ML: 2.5; .5 SOLUTION RESPIRATORY (INHALATION) at 12:58

## 2025-05-20 RX ADMIN — Medication 10 ML: at 08:45

## 2025-05-20 RX ADMIN — IPRATROPIUM BROMIDE AND ALBUTEROL SULFATE 3 ML: 2.5; .5 SOLUTION RESPIRATORY (INHALATION) at 21:30

## 2025-05-20 RX ADMIN — THIAMINE HYDROCHLORIDE 200 MG: 100 INJECTION, SOLUTION INTRAMUSCULAR; INTRAVENOUS at 20:32

## 2025-05-20 RX ADMIN — Medication 10 ML: at 20:34

## 2025-05-20 RX ADMIN — METHYLPREDNISOLONE SODIUM SUCCINATE 80 MG: 125 INJECTION, POWDER, FOR SOLUTION INTRAMUSCULAR; INTRAVENOUS at 04:23

## 2025-05-20 RX ADMIN — NICOTINE 1 PATCH: 21 PATCH TRANSDERMAL at 08:46

## 2025-05-20 RX ADMIN — ENOXAPARIN SODIUM 40 MG: 100 INJECTION SUBCUTANEOUS at 08:44

## 2025-05-20 RX ADMIN — IOPAMIDOL 75 ML: 755 INJECTION, SOLUTION INTRAVENOUS at 04:35

## 2025-05-20 RX ADMIN — THIAMINE HYDROCHLORIDE 200 MG: 100 INJECTION, SOLUTION INTRAMUSCULAR; INTRAVENOUS at 06:01

## 2025-05-20 RX ADMIN — MONTELUKAST 10 MG: 10 TABLET, FILM COATED ORAL at 20:32

## 2025-05-20 RX ADMIN — ROSUVASTATIN 40 MG: 20 TABLET, FILM COATED ORAL at 08:44

## 2025-05-20 RX ADMIN — Medication 1 TABLET: at 08:44

## 2025-05-20 RX ADMIN — METHYLPREDNISOLONE SODIUM SUCCINATE 80 MG: 125 INJECTION, POWDER, FOR SOLUTION INTRAMUSCULAR; INTRAVENOUS at 16:08

## 2025-05-20 RX ADMIN — GUAIFENESIN 600 MG: 600 TABLET, EXTENDED RELEASE ORAL at 08:44

## 2025-05-20 RX ADMIN — BUDESONIDE 0.5 MG: 0.5 SUSPENSION RESPIRATORY (INHALATION) at 08:59

## 2025-05-20 RX ADMIN — BENZONATATE 200 MG: 100 CAPSULE ORAL at 08:44

## 2025-05-20 RX ADMIN — LOSARTAN POTASSIUM 50 MG: 50 TABLET, FILM COATED ORAL at 08:44

## 2025-05-20 RX ADMIN — IPRATROPIUM BROMIDE AND ALBUTEROL SULFATE 3 ML: 2.5; .5 SOLUTION RESPIRATORY (INHALATION) at 16:17

## 2025-05-20 RX ADMIN — LEVOTHYROXINE SODIUM 100 MCG: 0.1 TABLET ORAL at 06:02

## 2025-05-20 RX ADMIN — CITALOPRAM HYDROBROMIDE 40 MG: 40 TABLET ORAL at 08:44

## 2025-05-20 RX ADMIN — THIAMINE HYDROCHLORIDE 200 MG: 100 INJECTION, SOLUTION INTRAMUSCULAR; INTRAVENOUS at 14:25

## 2025-05-20 RX ADMIN — SODIUM CHLORIDE 100 ML/HR: 900 INJECTION, SOLUTION INTRAVENOUS at 14:25

## 2025-05-20 RX ADMIN — DOXYCYCLINE 100 MG: 100 INJECTION, POWDER, LYOPHILIZED, FOR SOLUTION INTRAVENOUS at 08:45

## 2025-05-20 RX ADMIN — GUAIFENESIN 600 MG: 600 TABLET, EXTENDED RELEASE ORAL at 20:32

## 2025-05-20 RX ADMIN — FOLIC ACID 1 MG: 1 TABLET ORAL at 08:44

## 2025-05-20 RX ADMIN — IPRATROPIUM BROMIDE AND ALBUTEROL SULFATE 3 ML: 2.5; .5 SOLUTION RESPIRATORY (INHALATION) at 01:27

## 2025-05-20 RX ADMIN — DOXYCYCLINE 100 MG: 100 INJECTION, POWDER, LYOPHILIZED, FOR SOLUTION INTRAVENOUS at 20:32

## 2025-05-20 NOTE — PLAN OF CARE
Goal Outcome Evaluation:  Plan of Care Reviewed With: patient        Progress: improving  Outcome Evaluation: PT has been on oxygen at 4L NC and was moved to 3L and pt has maintained well. PT had a CT and was diagnosed with pneumonia and aortic aneurysm dialation. PT is still in droplet precaution

## 2025-05-20 NOTE — PROGRESS NOTES
[facility] Medicine Services  PROGRESS NOTE    Patient Name: Florence Dee  : 1960  MRN: [id number]    Date of Admission: 2025  Primary Care Physician: Joann Hankins DO    Subjective   Subjective     CC:  SOB    HPI:  Patient seen resting comfortably in bed in no acute distress.  Reports breathing has improved today.  Otherwise no acute complaints.      Objective   Objective     Vital Signs:   Temp:  [97.7 °F (36.5 °C)-101 °F (38.3 °C)] 98.3 °F (36.8 °C)  Heart Rate:  [] 84  Resp:  [16-24] 18  BP: (121-134)/(84-99) 131/86  Flow (L/min) (Oxygen Therapy):  [2-4] 2     Physical Exam  Cardiovascular:      Rate and Rhythm: Normal rate.      Pulses: Normal pulses.   Pulmonary:      Effort: Pulmonary effort is normal.      Comments: Manage breath sounds throughout  Abdominal:      General: There is no distension.      Palpations: Abdomen is soft.      Tenderness: There is no abdominal tenderness. There is no guarding or rebound.   Musculoskeletal:      Right lower leg: No edema.      Left lower leg: No edema.   Skin:     General: Skin is warm.   Neurological:      Mental Status: She is alert and oriented to person, place, and time.   Psychiatric:         Mood and Affect: Mood normal.         Behavior: Behavior normal.           Results Reviewed:  LAB RESULTS:      Lab 25  0420 25  0506 25  0320   WBC 10.51  --  7.14   HEMOGLOBIN 13.1  --  14.4   HEMATOCRIT 38.7  --  43.2   PLATELETS 236  --  254   NEUTROS ABS 9.37*  --  6.01   IMMATURE GRANS (ABS) 0.05  --  0.04   LYMPHS ABS 0.50*  --  0.55*   MONOS ABS 0.58  --  0.45   EOS ABS 0.00  --  0.07   MCV 99.0*  --  100.0*   LACTATE  --   --  1.3   D DIMER QUANT  --   --  0.63*   HSTROP T  --  <6 <6         Lab 25  1615 25  1223 25  0807 25  0420 25  0052 25  0809 25  0320   SODIUM 126* 126* 127* 125*  125* 124*   < > 125*   POTASSIUM  --   --   --  4.3  --   --   4.2   CHLORIDE  --   --   --  89*  --   --  88*   CO2  --   --   --  26.0  --   --  26.0   ANION GAP  --   --   --  10.0  --   --  11.0   BUN  --   --   --  10  --   --  7*   CREATININE  --   --   --  0.52*  --   --  0.57   EGFR  --   --   --  105.2  --   --  102.9   GLUCOSE  --   --   --  145*  --   --  106*   CALCIUM  --   --   --  8.6  --   --  8.4*   MAGNESIUM  --   --   --  2.0  --   --   --     < > = values in this interval not displayed.         Lab 05/20/25  0420 05/19/25  0320   TOTAL PROTEIN 6.1 6.8   ALBUMIN 3.8 4.3   GLOBULIN 2.3 2.5   ALT (SGPT) 51* 85*   AST (SGOT) 38* 81*   BILIRUBIN 0.3 0.3   ALK PHOS 65 87         Lab 05/19/25  0506 05/19/25  0320   PROBNP  --  36.6   HSTROP T <6 <6                 Lab 05/19/25  0340   PH, ARTERIAL 7.412   PCO2, ARTERIAL 44.1   PO2 ART 76.4*   FIO2 36   HCO3 ART 28.0*   BASE EXCESS ART 2.9*   CARBOXYHEMOGLOBIN 3.2*     Brief Urine Lab Results       None            Microbiology Results Abnormal       Procedure Component Value - Date/Time    Respiratory Panel PCR w/COVID-19(SARS-CoV-2) [facility] In-House, NP Swab in [facility], 2 HR TAT - Swab, Nasopharynx [[id number]]  (Abnormal) Collected: 05/19/25 0327    Lab Status: Final result Specimen: Swab from Nasopharynx Updated: 05/19/25 0609     ADENOVIRUS, PCR Not Detected     Coronavirus 229E Not Detected     Coronavirus HKU1 Not Detected     Coronavirus NL63 Not Detected     Coronavirus OC43 Not Detected     COVID19 Not Detected     Human Metapneumovirus Not Detected     Human Rhinovirus/Enterovirus Not Detected     Influenza A PCR Not Detected     Influenza B PCR Not Detected     Parainfluenza Virus 1 Not Detected     Parainfluenza Virus 2 Not Detected     Parainfluenza Virus 3 Detected     Parainfluenza Virus 4 Not Detected     RSV, PCR Not Detected     Bordetella pertussis pcr Not Detected     Bordetella parapertussis PCR Not Detected     Chlamydophila pneumoniae PCR Not Detected     Mycoplasma pneumo by  PCR Not Detected    Narrative:      In the setting of a positive respiratory panel with a viral infection PLUS a negative procalcitonin without other underlying concern for bacterial infection, consider observing off antibiotics or discontinuation of antibiotics and continue supportive care. If the respiratory panel is positive for atypical bacterial infection (Bordetella pertussis, Chlamydophila pneumoniae, or Mycoplasma pneumoniae), consider antibiotic de-escalation to target atypical bacterial infection.            CT Angiogram Chest  Result Date: 5/20/2025  CT ANGIOGRAM CHEST Date of Exam: 5/20/2025 4:31 AM EDT Indication: SOB, elevated D-dimer. Comparison: Chest radiograph 5/19/2025 Technique: CTA of the chest was performed before and after the uneventful intravenous administration of 75 mL Isovue-370. Reconstructed coronal and sagittal images were also obtained. In addition, a 3-D volume rendered image was created for interpretation. Automated exposure control and iterative reconstruction methods were used. Findings: Thyroid is atrophic and not well seen. The trachea and esophagus appear within normal limits. Heart size is normal. Minimal coronary artery calcification. No pericardial effusion or mediastinal lymphadenopathy. There is aneurysmal dilatation of the ascending aorta up to 43 mm. There is minimal aortic atherosclerosis. Main pulmonary artery is normal diameter. No evidence of pulmonary embolism. There is patchy airspace disease in the left lung and there is tree-in-bud nodularity in the right lower lobe consistent with multifocal pneumonia. There is associated peribronchial thickening, which is most pronounced in the left dependent lung. There is mild reactive left hilar adenopathy. Airways are patent. No pneumothorax or pleural effusion. There is mild emphysema. No acute findings in the superficial soft tissues or within the limited images of the upper abdomen. No acute osseous abnormality or  significant osseous degenerative change.     Impression: Impression: 1.No evidence of pulmonary embolism. 2.Multifocal pneumonia, most pronounced in the left lung. 3.Mild emphysema. 4.Aneurysmal dilatation of the ascending aorta up to 43 mm. Electronically Signed: Marshall Craig MD  5/20/2025 4:42 AM EDT  Workstation ID: [id number]    XR Chest 1 View  Result Date: 5/19/2025  XR CHEST 1 VW Date of Exam: 5/19/2025 3:11 AM EDT Indication: SOA triage protocol Comparison: None available. Findings: The lungs are hyperexpanded. There is interstitial prominence. There is no pneumothorax, pleural effusion or focal airspace consolidation. Heart size and pulmonary vasculature appear within normal limits. Regional bones appear intact.     Impression: Impression: Hyperexpanded lungs with interstitial prominence. Correlate for COPD. Electronically Signed: Marshall Craig MD  5/19/2025 4:18 AM EDT  Workstation ID: [id number]          Current medications:  Scheduled Meds:arformoterol, 15 mcg, Nebulization, BID - RT   And  budesonide, 0.5 mg, Nebulization, BID - RT  cefTRIAXone, 1,000 mg, Intravenous, Q24H  citalopram, 40 mg, Oral, Daily  doxycycline, 100 mg, Intravenous, BID  enoxaparin sodium, 40 mg, Subcutaneous, Daily  fenofibrate, 48 mg, Oral, Daily  folic acid, 1 mg, Oral, Daily  guaiFENesin, 600 mg, Oral, Q12H  ipratropium-albuterol, 3 mL, Nebulization, Q4H - RT  levothyroxine, 100 mcg, Oral, QAM  losartan, 50 mg, Oral, Q24H  methylPREDNISolone sodium succinate, 80 mg, Intravenous, Q12H  montelukast, 10 mg, Oral, Nightly  multivitamin with minerals, 1 tablet, Oral, Daily  nicotine, 1 patch, Transdermal, Q24H  rosuvastatin, 40 mg, Oral, Daily  sodium chloride, 10 mL, Intravenous, Q12H  thiamine (B-1) IV, 200 mg, Intravenous, Q8H   Followed by  [START ON 5/25/2025] thiamine, 100 mg, Oral, Daily      Continuous Infusions:sodium chloride, 100 mL/hr, Last Rate: 100 mL/hr (05/20/25 1628)      PRN Meds:.  acetaminophen **OR**  acetaminophen **OR** acetaminophen    benzonatate    senna-docusate sodium **AND** polyethylene glycol **AND** bisacodyl **AND** bisacodyl    ipratropium-albuterol    LORazepam **OR** midazolam **OR** LORazepam **OR** midazolam **OR** midazolam **OR** midazolam    Magnesium Standard Dose Replacement - Follow Nurse / BPA Driven Protocol    melatonin    nitroglycerin    ondansetron ODT **OR** ondansetron    sodium chloride    sodium chloride    sodium chloride    Assessment & Plan   Assessment & Plan     Active Hospital Problems    Diagnosis  POA    **COPD exacerbation [J44.1]  Yes    Hyponatremia [E87.1]  Unknown    Dyslipidemia [E78.5]  Unknown    Parainfluenza virus infection [B34.8]  Unknown    Acute on chronic respiratory failure with hypoxia [J96.21]  Yes    History of cigarette smoking [Z87.891]  Not Applicable    Primary hypertension [I10]  Yes    Pulmonary emphysema [J43.9]  Yes    Acquired hypothyroidism [E03.9]  Yes      Resolved Hospital Problems   No resolved problems to display.        Brief Hospital Course to date:  Florence Dee is a 64 y.o. female with past medical history significant for COPD, hypothyroidism, hypertension, hyperlipidemia, history of tobacco abuse.  Patient was admitted to the hospital for severe shortness of breath and respiratory failure secondary to parainfluenza infection and also COPD exacerbation.     COPD exacerbation 2/2 parainfluenza viral infection  Ongoing tobacco abuse  Acute respiratory failure secondary to above  - Chest x-ray revealing hyperexpanded lungs  - CTA chest negative for pulmonary embolism, however revealing a multifocal pneumonia, mild emphysema  - Respiratory panel positive for parainfluenza virus  PLAN  - Start ceftriaxone and doxycycline  - Continue inhalers and nebulizers.  Continue IV steroids for now.  - Incentive spirometer and flutter valve  - Wean oxygen for goal saturation of 88-92%     Hyponatremia, symptomatic  - Holding home  hydrochlorothiazide  - Continue home citalopram for now, may need to discontinue if patient continues to be hyponatremic  - Continue every 4 hour sodium checks.  Start IV fluids today in setting of low urine sodium, low serum osmolality     Elevated LFT's  - Chronically elevated, monitor  - On tricor / statin, continue for now     HTN / HLD  - Continue ARB, hold HCTZ 2/2 low sodium  - Continue tricor / statin, monitor LFT's     Hypothyroidism  - Continue levothyroxine     Alcohol abuse  - Start on alcohol withdrawal protocol, CIWA  - Fall precaution     Mood disorder  - Continue citalopram, monitor sodium levels    Aneurysmal dilation of ascending aorta  - Incidental finding, dilatation of 43 mm.  Will need outpatient surveillance.    Expected Discharge Location and Transportation: D  Expected Discharge   Expected Discharge Date: 5/21/2025; Expected Discharge Time:      VTE Prophylaxis:  Pharmacologic VTE prophylaxis orders are present.         AM-PAC 6 Clicks Score (PT): 24 (05/19/25 2025)    CODE STATUS:   Code Status and Medical Interventions: CPR (Attempt to Resuscitate); Full Support   Ordered at: 05/19/25 0610     Code Status (Patient has no pulse and is not breathing):    CPR (Attempt to Resuscitate)     Medical Interventions (Patient has pulse or is breathing):    Full Support       Colin Almanzar DO  05/20/25

## 2025-05-20 NOTE — PLAN OF CARE
Goal Outcome Evaluation:              Outcome Evaluation: VSS on 2L. NSR. no SOB reported this shift. alert and oriented X 4. no complains of pain/discomfort.  NS @ 100ml/hr.

## 2025-05-21 LAB
ANION GAP SERPL CALCULATED.3IONS-SCNC: 9 MMOL/L (ref 5–15)
BUN SERPL-MCNC: 11 MG/DL (ref 8–23)
BUN/CREAT SERPL: 20.4 (ref 7–25)
CALCIUM SPEC-SCNC: 8.5 MG/DL (ref 8.6–10.5)
CHLORIDE SERPL-SCNC: 91 MMOL/L (ref 98–107)
CO2 SERPL-SCNC: 25 MMOL/L (ref 22–29)
CREAT SERPL-MCNC: 0.54 MG/DL (ref 0.57–1)
EGFRCR SERPLBLD CKD-EPI 2021: 104.2 ML/MIN/1.73
GLUCOSE SERPL-MCNC: 139 MG/DL (ref 65–99)
POTASSIUM SERPL-SCNC: 4.4 MMOL/L (ref 3.5–5.2)
SODIUM SERPL-SCNC: 124 MMOL/L (ref 136–145)
SODIUM SERPL-SCNC: 125 MMOL/L (ref 136–145)
SODIUM SERPL-SCNC: 127 MMOL/L (ref 136–145)
SODIUM SERPL-SCNC: 128 MMOL/L (ref 136–145)
SODIUM SERPL-SCNC: 129 MMOL/L (ref 136–145)

## 2025-05-21 PROCEDURE — 25010000002 DOXYCYCLINE 100 MG RECONSTITUTED SOLUTION 1 EACH VIAL: Performed by: NURSE PRACTITIONER

## 2025-05-21 PROCEDURE — 84295 ASSAY OF SERUM SODIUM: CPT | Performed by: NURSE PRACTITIONER

## 2025-05-21 PROCEDURE — 94761 N-INVAS EAR/PLS OXIMETRY MLT: CPT

## 2025-05-21 PROCEDURE — 80048 BASIC METABOLIC PNL TOTAL CA: CPT | Performed by: STUDENT IN AN ORGANIZED HEALTH CARE EDUCATION/TRAINING PROGRAM

## 2025-05-21 PROCEDURE — 99232 SBSQ HOSP IP/OBS MODERATE 35: CPT | Performed by: STUDENT IN AN ORGANIZED HEALTH CARE EDUCATION/TRAINING PROGRAM

## 2025-05-21 PROCEDURE — 25010000002 CEFTRIAXONE PER 250 MG: Performed by: STUDENT IN AN ORGANIZED HEALTH CARE EDUCATION/TRAINING PROGRAM

## 2025-05-21 PROCEDURE — 25010000002 THIAMINE HCL 200 MG/2ML SOLUTION: Performed by: INTERNAL MEDICINE

## 2025-05-21 PROCEDURE — 94799 UNLISTED PULMONARY SVC/PX: CPT

## 2025-05-21 PROCEDURE — 25010000002 ENOXAPARIN PER 10 MG: Performed by: NURSE PRACTITIONER

## 2025-05-21 PROCEDURE — 97162 PT EVAL MOD COMPLEX 30 MIN: CPT

## 2025-05-21 PROCEDURE — 25810000003 SODIUM CHLORIDE 0.9 % SOLUTION: Performed by: STUDENT IN AN ORGANIZED HEALTH CARE EDUCATION/TRAINING PROGRAM

## 2025-05-21 PROCEDURE — 94664 DEMO&/EVAL PT USE INHALER: CPT

## 2025-05-21 PROCEDURE — 97530 THERAPEUTIC ACTIVITIES: CPT

## 2025-05-21 PROCEDURE — 25010000002 METHYLPREDNISOLONE PER 125 MG: Performed by: INTERNAL MEDICINE

## 2025-05-21 RX ORDER — CITALOPRAM HYDROBROMIDE 20 MG/1
20 TABLET ORAL DAILY
Status: DISCONTINUED | OUTPATIENT
Start: 2025-05-21 | End: 2025-05-21

## 2025-05-21 RX ORDER — CITALOPRAM HYDROBROMIDE 20 MG/1
20 TABLET ORAL DAILY
Status: DISCONTINUED | OUTPATIENT
Start: 2025-05-22 | End: 2025-05-24 | Stop reason: HOSPADM

## 2025-05-21 RX ORDER — CITALOPRAM HYDROBROMIDE 10 MG/1
10 TABLET ORAL DAILY
Status: DISCONTINUED | OUTPATIENT
Start: 2025-05-28 | End: 2025-05-21

## 2025-05-21 RX ORDER — SODIUM CHLORIDE 1 G/1
1 TABLET ORAL
Status: DISCONTINUED | OUTPATIENT
Start: 2025-05-21 | End: 2025-05-24

## 2025-05-21 RX ORDER — WATER 10 ML/10ML
INJECTION INTRAMUSCULAR; INTRAVENOUS; SUBCUTANEOUS
Status: COMPLETED
Start: 2025-05-21 | End: 2025-05-21

## 2025-05-21 RX ORDER — DOXYCYCLINE 100 MG/1
100 CAPSULE ORAL EVERY 12 HOURS SCHEDULED
Status: COMPLETED | OUTPATIENT
Start: 2025-05-21 | End: 2025-05-24

## 2025-05-21 RX ADMIN — FOLIC ACID 1 MG: 1 TABLET ORAL at 08:58

## 2025-05-21 RX ADMIN — LOSARTAN POTASSIUM 50 MG: 50 TABLET, FILM COATED ORAL at 08:58

## 2025-05-21 RX ADMIN — IPRATROPIUM BROMIDE AND ALBUTEROL SULFATE 3 ML: 2.5; .5 SOLUTION RESPIRATORY (INHALATION) at 16:07

## 2025-05-21 RX ADMIN — ENOXAPARIN SODIUM 40 MG: 100 INJECTION SUBCUTANEOUS at 08:58

## 2025-05-21 RX ADMIN — SODIUM CHLORIDE 1 G: 1 TABLET ORAL at 17:19

## 2025-05-21 RX ADMIN — MONTELUKAST 10 MG: 10 TABLET, FILM COATED ORAL at 20:01

## 2025-05-21 RX ADMIN — IPRATROPIUM BROMIDE AND ALBUTEROL SULFATE 3 ML: 2.5; .5 SOLUTION RESPIRATORY (INHALATION) at 23:38

## 2025-05-21 RX ADMIN — IPRATROPIUM BROMIDE AND ALBUTEROL SULFATE 3 ML: 2.5; .5 SOLUTION RESPIRATORY (INHALATION) at 20:18

## 2025-05-21 RX ADMIN — DOXYCYCLINE 100 MG: 100 INJECTION, POWDER, LYOPHILIZED, FOR SOLUTION INTRAVENOUS at 09:50

## 2025-05-21 RX ADMIN — SODIUM CHLORIDE 1000 MG: 900 INJECTION INTRAVENOUS at 08:58

## 2025-05-21 RX ADMIN — WATER: 1 INJECTION INTRAMUSCULAR; INTRAVENOUS; SUBCUTANEOUS at 16:00

## 2025-05-21 RX ADMIN — ARFORMOTEROL TARTRATE 15 MCG: 15 SOLUTION RESPIRATORY (INHALATION) at 08:40

## 2025-05-21 RX ADMIN — GUAIFENESIN 600 MG: 600 TABLET, EXTENDED RELEASE ORAL at 20:01

## 2025-05-21 RX ADMIN — BUDESONIDE 0.5 MG: 0.5 SUSPENSION RESPIRATORY (INHALATION) at 08:40

## 2025-05-21 RX ADMIN — IPRATROPIUM BROMIDE AND ALBUTEROL SULFATE 3 ML: 2.5; .5 SOLUTION RESPIRATORY (INHALATION) at 00:31

## 2025-05-21 RX ADMIN — GUAIFENESIN 600 MG: 600 TABLET, EXTENDED RELEASE ORAL at 08:58

## 2025-05-21 RX ADMIN — Medication 5 MG: at 01:57

## 2025-05-21 RX ADMIN — IPRATROPIUM BROMIDE AND ALBUTEROL SULFATE 3 ML: 2.5; .5 SOLUTION RESPIRATORY (INHALATION) at 08:40

## 2025-05-21 RX ADMIN — FENOFIBRATE 48 MG: 48 TABLET ORAL at 08:58

## 2025-05-21 RX ADMIN — LEVOTHYROXINE SODIUM 100 MCG: 0.1 TABLET ORAL at 05:59

## 2025-05-21 RX ADMIN — SODIUM CHLORIDE 1 G: 1 TABLET ORAL at 09:02

## 2025-05-21 RX ADMIN — BUDESONIDE 0.5 MG: 0.5 SUSPENSION RESPIRATORY (INHALATION) at 20:18

## 2025-05-21 RX ADMIN — Medication 5 MG: at 21:06

## 2025-05-21 RX ADMIN — ROSUVASTATIN 40 MG: 20 TABLET, FILM COATED ORAL at 08:58

## 2025-05-21 RX ADMIN — IPRATROPIUM BROMIDE AND ALBUTEROL SULFATE 3 ML: 2.5; .5 SOLUTION RESPIRATORY (INHALATION) at 12:08

## 2025-05-21 RX ADMIN — NICOTINE 1 PATCH: 21 PATCH TRANSDERMAL at 09:02

## 2025-05-21 RX ADMIN — METHYLPREDNISOLONE SODIUM SUCCINATE 80 MG: 125 INJECTION, POWDER, FOR SOLUTION INTRAMUSCULAR; INTRAVENOUS at 16:00

## 2025-05-21 RX ADMIN — Medication 1 TABLET: at 08:58

## 2025-05-21 RX ADMIN — METHYLPREDNISOLONE SODIUM SUCCINATE 80 MG: 125 INJECTION, POWDER, FOR SOLUTION INTRAMUSCULAR; INTRAVENOUS at 06:00

## 2025-05-21 RX ADMIN — ARFORMOTEROL TARTRATE 15 MCG: 15 SOLUTION RESPIRATORY (INHALATION) at 20:19

## 2025-05-21 RX ADMIN — IPRATROPIUM BROMIDE AND ALBUTEROL SULFATE 3 ML: 2.5; .5 SOLUTION RESPIRATORY (INHALATION) at 04:15

## 2025-05-21 RX ADMIN — SODIUM CHLORIDE 100 ML/HR: 900 INJECTION, SOLUTION INTRAVENOUS at 01:09

## 2025-05-21 RX ADMIN — CITALOPRAM 20 MG: 20 TABLET, FILM COATED ORAL at 09:02

## 2025-05-21 RX ADMIN — THIAMINE HYDROCHLORIDE 200 MG: 100 INJECTION, SOLUTION INTRAMUSCULAR; INTRAVENOUS at 06:00

## 2025-05-21 RX ADMIN — SODIUM CHLORIDE 1 G: 1 TABLET ORAL at 12:22

## 2025-05-21 RX ADMIN — STANDARDIZED SENNA CONCENTRATE AND DOCUSATE SODIUM 2 TABLET: 8.6; 5 TABLET, FILM COATED ORAL at 17:56

## 2025-05-21 RX ADMIN — DOXYCYCLINE 100 MG: 100 CAPSULE ORAL at 20:01

## 2025-05-21 NOTE — PLAN OF CARE
Goal Outcome Evaluation:  Plan of Care Reviewed With: patient           Outcome Evaluation: Physical therapy evaluation complete. The patient with improved stability and gait speed with use of rollator. Patient reports decreased symptoms of dyspnea with rollator. Patient presents below baseline for mobility and would continue to benefit from skilled PT to address strength and activity tolerance deficits. Recommend rollator at hospital discharge.    Anticipated Discharge Disposition (PT): home with assist

## 2025-05-21 NOTE — PLAN OF CARE
Goal Outcome Evaluation:  Plan of Care Reviewed With: patient        Progress: improving  Outcome Evaluation: Outcome Evaluation: PT maintaining on 2L NC. PT is getting hot flashes that she thinks is due to the steroid. The pt does claim she feels a little better but chest is still tight when she breathes. PT has been restless and was giving melatonin at her request

## 2025-05-21 NOTE — CASE MANAGEMENT/SOCIAL WORK
Continued Stay Note  [facility]     Patient Name: Florence Dee  MRN: [id number]  Today's Date: 5/21/2025    Admit Date: 5/19/2025    Plan: Home   Discharge Plan       Row Name 05/21/25 1131       Plan    Plan Home    Patient/Family in Agreement with Plan yes    Plan Comments I spoke with Ms Dee at bedside.  During MDR it was discussed that she may be discharged on Friday.  At this time Ms eDe denies any discharge needs.  She is currently on room air at 94% when I spoke to her. Case management will continue to follow.    Final Discharge Disposition Code 01 - home or self-care                   Discharge Codes    No documentation.                 Expected Discharge Date and Time       Expected Discharge Date Expected Discharge Time    May 21, 2025               Samantha Steiner RN

## 2025-05-21 NOTE — PROGRESS NOTES
[facility] Medicine Services  PROGRESS NOTE    Patient Name: Florence Dee  : 1960  MRN: [id number]    Date of Admission: 2025  Primary Care Physician: Joann Hankins DO    Subjective   Subjective     CC:  SOB    HPI:  Patient seen resting comfortably in bed in no acute distress.  Reports improvement in respiratory status today.  Breathing much better on exam today.  Minimal wheezing.      Objective   Objective     Vital Signs:   Temp:  [98 °F (36.7 °C)-98.7 °F (37.1 °C)] 98.3 °F (36.8 °C)  Heart Rate:  [65-97] 97  Resp:  [16-20] 18  BP: (121-144)/(84-94) 125/84  Flow (L/min) (Oxygen Therapy):  [1-2] 1     Physical Exam  Cardiovascular:      Rate and Rhythm: Normal rate.      Pulses: Normal pulses.   Pulmonary:      Effort: Pulmonary effort is normal. No respiratory distress.      Breath sounds: Wheezing present.      Comments: Diminished breath sounds throughout  Abdominal:      General: There is no distension.      Palpations: Abdomen is soft.      Tenderness: There is no abdominal tenderness. There is no guarding or rebound.   Musculoskeletal:      Right lower leg: No edema.      Left lower leg: No edema.   Skin:     General: Skin is warm.   Neurological:      Mental Status: She is alert and oriented to person, place, and time.   Psychiatric:         Mood and Affect: Mood normal.         Behavior: Behavior normal.           Results Reviewed:  LAB RESULTS:      Lab 25  0420 25  0506 25  0320   WBC 10.51  --  7.14   HEMOGLOBIN 13.1  --  14.4   HEMATOCRIT 38.7  --  43.2   PLATELETS 236  --  254   NEUTROS ABS 9.37*  --  6.01   IMMATURE GRANS (ABS) 0.05  --  0.04   LYMPHS ABS 0.50*  --  0.55*   MONOS ABS 0.58  --  0.45   EOS ABS 0.00  --  0.07   MCV 99.0*  --  100.0*   LACTATE  --   --  1.3   D DIMER QUANT  --   --  0.63*   HSTROP T  --  <6 <6         Lab 25  1352 25  0759 25  0402 25  0013 25  2201 25  0807  05/20/25  0420 05/19/25  0809 05/19/25  0320   SODIUM 128* 125* 124* 125*  125* 126*   < > 125*  125*   < > 125*   POTASSIUM  --   --   --  4.4  --   --  4.3  --  4.2   CHLORIDE  --   --   --  91*  --   --  89*  --  88*   CO2  --   --   --  25.0  --   --  26.0  --  26.0   ANION GAP  --   --   --  9.0  --   --  10.0  --  11.0   BUN  --   --   --  11  --   --  10  --  7*   CREATININE  --   --   --  0.54*  --   --  0.52*  --  0.57   EGFR  --   --   --  104.2  --   --  105.2  --  102.9   GLUCOSE  --   --   --  139*  --   --  145*  --  106*   CALCIUM  --   --   --  8.5*  --   --  8.6  --  8.4*   MAGNESIUM  --   --   --   --   --   --  2.0  --   --     < > = values in this interval not displayed.         Lab 05/20/25  0420 05/19/25 0320   TOTAL PROTEIN 6.1 6.8   ALBUMIN 3.8 4.3   GLOBULIN 2.3 2.5   ALT (SGPT) 51* 85*   AST (SGOT) 38* 81*   BILIRUBIN 0.3 0.3   ALK PHOS 65 87         Lab 05/19/25  0506 05/19/25  0320   PROBNP  --  36.6   HSTROP T <6 <6                 Lab 05/19/25  0340   PH, ARTERIAL 7.412   PCO2, ARTERIAL 44.1   PO2 ART 76.4*   FIO2 36   HCO3 ART 28.0*   BASE EXCESS ART 2.9*   CARBOXYHEMOGLOBIN 3.2*     Brief Urine Lab Results       None            Microbiology Results Abnormal       Procedure Component Value - Date/Time    Respiratory Panel PCR w/COVID-19(SARS-CoV-2) [facility] In-House, NP Swab in [facility], 2 HR TAT - Swab, Nasopharynx [[id number]]  (Abnormal) Collected: 05/19/25 0327    Lab Status: Final result Specimen: Swab from Nasopharynx Updated: 05/19/25 0609     ADENOVIRUS, PCR Not Detected     Coronavirus 229E Not Detected     Coronavirus HKU1 Not Detected     Coronavirus NL63 Not Detected     Coronavirus OC43 Not Detected     COVID19 Not Detected     Human Metapneumovirus Not Detected     Human Rhinovirus/Enterovirus Not Detected     Influenza A PCR Not Detected     Influenza B PCR Not Detected     Parainfluenza Virus 1 Not Detected     Parainfluenza Virus 2 Not Detected      Parainfluenza Virus 3 Detected     Parainfluenza Virus 4 Not Detected     RSV, PCR Not Detected     Bordetella pertussis pcr Not Detected     Bordetella parapertussis PCR Not Detected     Chlamydophila pneumoniae PCR Not Detected     Mycoplasma pneumo by PCR Not Detected    Narrative:      In the setting of a positive respiratory panel with a viral infection PLUS a negative procalcitonin without other underlying concern for bacterial infection, consider observing off antibiotics or discontinuation of antibiotics and continue supportive care. If the respiratory panel is positive for atypical bacterial infection (Bordetella pertussis, Chlamydophila pneumoniae, or Mycoplasma pneumoniae), consider antibiotic de-escalation to target atypical bacterial infection.            CT Angiogram Chest  Result Date: 5/20/2025  CT ANGIOGRAM CHEST Date of Exam: 5/20/2025 4:31 AM EDT Indication: SOB, elevated D-dimer. Comparison: Chest radiograph 5/19/2025 Technique: CTA of the chest was performed before and after the uneventful intravenous administration of 75 mL Isovue-370. Reconstructed coronal and sagittal images were also obtained. In addition, a 3-D volume rendered image was created for interpretation. Automated exposure control and iterative reconstruction methods were used. Findings: Thyroid is atrophic and not well seen. The trachea and esophagus appear within normal limits. Heart size is normal. Minimal coronary artery calcification. No pericardial effusion or mediastinal lymphadenopathy. There is aneurysmal dilatation of the ascending aorta up to 43 mm. There is minimal aortic atherosclerosis. Main pulmonary artery is normal diameter. No evidence of pulmonary embolism. There is patchy airspace disease in the left lung and there is tree-in-bud nodularity in the right lower lobe consistent with multifocal pneumonia. There is associated peribronchial thickening, which is most pronounced in the left dependent lung. There is  mild reactive left hilar adenopathy. Airways are patent. No pneumothorax or pleural effusion. There is mild emphysema. No acute findings in the superficial soft tissues or within the limited images of the upper abdomen. No acute osseous abnormality or significant osseous degenerative change.     Impression: Impression: 1.No evidence of pulmonary embolism. 2.Multifocal pneumonia, most pronounced in the left lung. 3.Mild emphysema. 4.Aneurysmal dilatation of the ascending aorta up to 43 mm. Electronically Signed: Marshall Craig MD  5/20/2025 4:42 AM EDT  Workstation ID: [id number]          Current medications:  Scheduled Meds:arformoterol, 15 mcg, Nebulization, BID - RT   And  budesonide, 0.5 mg, Nebulization, BID - RT  cefTRIAXone, 1,000 mg, Intravenous, Q24H  [START ON 5/22/2025] citalopram, 20 mg, Oral, Daily  doxycycline, 100 mg, Oral, Q12H  enoxaparin sodium, 40 mg, Subcutaneous, Daily  fenofibrate, 48 mg, Oral, Daily  folic acid, 1 mg, Oral, Daily  guaiFENesin, 600 mg, Oral, Q12H  ipratropium-albuterol, 3 mL, Nebulization, Q4H - RT  levothyroxine, 100 mcg, Oral, QAM  losartan, 50 mg, Oral, Q24H  methylPREDNISolone sodium succinate, 80 mg, Intravenous, Q12H  montelukast, 10 mg, Oral, Nightly  multivitamin with minerals, 1 tablet, Oral, Daily  nicotine, 1 patch, Transdermal, Q24H  rosuvastatin, 40 mg, Oral, Daily  sodium chloride, 10 mL, Intravenous, Q12H  sodium chloride, 1 g, Oral, TID With Meals  thiamine (B-1) IV, 200 mg, Intravenous, Q8H   Followed by  [START ON 5/25/2025] thiamine, 100 mg, Oral, Daily      Continuous Infusions:     PRN Meds:.  acetaminophen **OR** acetaminophen **OR** acetaminophen    benzonatate    senna-docusate sodium **AND** polyethylene glycol **AND** bisacodyl **AND** bisacodyl    ipratropium-albuterol    LORazepam **OR** midazolam **OR** LORazepam **OR** midazolam **OR** midazolam **OR** midazolam    Magnesium Standard Dose Replacement - Follow Nurse / BPA Driven Protocol    melatonin     nitroglycerin    ondansetron ODT **OR** ondansetron    sodium chloride    sodium chloride    sodium chloride    Assessment & Plan   Assessment & Plan     Active Hospital Problems    Diagnosis  POA    **COPD exacerbation [J44.1]  Yes    Hyponatremia [E87.1]  Unknown    Dyslipidemia [E78.5]  Unknown    Parainfluenza virus infection [B34.8]  Unknown    Acute on chronic respiratory failure with hypoxia [J96.21]  Yes    History of cigarette smoking [Z87.891]  Not Applicable    Primary hypertension [I10]  Yes    Pulmonary emphysema [J43.9]  Yes    Acquired hypothyroidism [E03.9]  Yes      Resolved Hospital Problems   No resolved problems to display.        Brief Hospital Course to date:  Florence Dee is a 64 y.o. female with past medical history significant for COPD, hypothyroidism, hypertension, hyperlipidemia, history of tobacco abuse.  Patient was admitted to the hospital for severe shortness of breath and respiratory failure secondary to parainfluenza infection and also COPD exacerbation.     Acute hypoxic respiratory failure secondary to parainfluenza infection, multifocal pneumonia, and COPD exacerbation  Ongoing tobacco abuse  - Chest x-ray revealing hyperexpanded lungs  - CTA chest negative for pulmonary embolism, however revealing a multifocal pneumonia, mild emphysema  - Respiratory panel positive for parainfluenza virus  PLAN  - Start ceftriaxone and doxycycline  - Continue inhalers and nebulizers.  Continue IV steroids for now.  Plan to taper in upcoming days  - Incentive spirometer and flutter valve  - Wean oxygen for goal saturation of 88-92%     Hyponatremia, asymptomatic  - Holding home hydrochlorothiazide  - Will lower dose of citalopram to 20 mg daily.  Start salt tabs.  - Continue every 4 hour sodium checks.       Elevated LFT's  - Chronically elevated, monitor  - On tricor / statin, continue for now     HTN / HLD  - Continue ARB, hold HCTZ 2/2 low sodium  - Continue tricor / statin, monitor  LFT's     Hypothyroidism  - Continue levothyroxine     Alcohol abuse  - Start on alcohol withdrawal protocol, CIWA  - Fall precaution     Mood disorder  - Continue citalopram, monitor sodium levels    Aneurysmal dilation of ascending aorta  - Incidental finding, dilatation of 43 mm.  Will need outpatient surveillance.    Expected Discharge Location and Transportation: Home  Expected Discharge   Expected Discharge Date: 5/21/2025; Expected Discharge Time:      VTE Prophylaxis:  Pharmacologic VTE prophylaxis orders are present.         AM-PAC 6 Clicks Score (PT): 22 (05/21/25 3796)    CODE STATUS:   Code Status and Medical Interventions: CPR (Attempt to Resuscitate); Full Support   Ordered at: 05/19/25 0610     Code Status (Patient has no pulse and is not breathing):    CPR (Attempt to Resuscitate)     Medical Interventions (Patient has pulse or is breathing):    Full Support       Colin Almanzar DO  05/21/25

## 2025-05-21 NOTE — THERAPY EVALUATION
Patient Name: Florence Dee  : 1960    MRN: [id number]                              Today's Date: 2025       Admit Date: 2025    Visit Dx:     ICD-10-CM ICD-9-CM   1. Acute respiratory failure with hypoxia  J96.01 518.81   2. Atypical pneumonia  J18.9 486   3. COPD exacerbation  J44.1 491.21   4. Primary hypertension  I10 401.9   5. Transaminitis  R74.01 790.4     Patient Active Problem List   Diagnosis    Acquired hypothyroidism    Mild intermittent asthma without complication    History of cigarette smoking    Primary hypertension    Pulmonary emphysema    COPD exacerbation    Hyponatremia    Dyslipidemia    Parainfluenza virus infection    Acute on chronic respiratory failure with hypoxia     Past Medical History:   Diagnosis Date    Disease of thyroid gland     Graves disease     Hypertension      Past Surgical History:   Procedure Laterality Date    ORBITAL DECOMPRESSION        General Information       Row Name 25 1533          Physical Therapy Time and Intention    Document Type evaluation  -ML     Mode of Treatment physical therapy  -       Row Name 25 1533          General Information    Patient Profile Reviewed yes  -ML     Prior Level of Function independent:;all household mobility;community mobility;gait;transfer;bed mobility;ADL's;shopping;using stairs  -ML     Existing Precautions/Restrictions fall  monitor oxygen saturation  -ML     Barriers to Rehab none identified  -ML       Row Name 25 1533          Living Environment    Current Living Arrangements home  -ML     People in Home spouse  -ML       Row Name 25 1533          Home Main Entrance    Number of Stairs, Main Entrance none  -ML       Row Name 25 1533          Stairs Within Home, Primary    Number of Stairs, Within Home, Primary twelve  -ML     Stair Railings, Within Home, Primary railing on right side (ascending)  -ML       Row Name 25 1533          Cognition    Orientation Status  (Cognition) oriented x 3  -ML       Row Name 05/21/25 1533          Safety Issues/Impairments Affecting Functional Mobility    Safety Issues Affecting Function (Mobility) insight into deficits/self-awareness;safety precaution awareness  -ML     Impairments Affecting Function (Mobility) endurance/activity tolerance;shortness of breath;strength  -ML               User Key  (r) = Recorded By, (t) = Taken By, (c) = Cosigned By      Initials Name Provider Type    ML Cordelia Florian Physical Therapist                   Mobility       Row Name 05/21/25 1536          Bed Mobility    Bed Mobility supine-sit  -ML     Supine-Sit [place] (Bed Mobility) modified independence  -ML     Assistive Device (Bed Mobility) head of bed elevated  -ML       Row Name 05/21/25 1536          Sit-Stand Transfer    Sit-Stand [place] (Transfers) standby assist  -ML     Assistive Device (Sit-Stand Transfers) other (see comments);walker, 4-wheeled  no AD  -ML       Row Name 05/21/25 1536          Gait/Stairs (Locomotion)    [place] Level (Gait) standby assist  -ML     Assistive Device (Gait) walker, 4-wheeled  -ML     Distance in Feet (Gait) 15  + 45  -ML     Deviations/Abnormal Patterns (Gait) taj decreased  -ML     Comment, (Gait/Stairs) Patient demonstrates step to gait pattern without AD and decreased gait speed. With use of rollator patient demonstrates step through gait pattern and increased gait speed. Patient cues for pursed lip breathing. Patient reports improvement in dyspnea with use of rollator.  -ML               User Key  (r) = Recorded By, (t) = Taken By, (c) = Cosigned By      Initials Name Provider Type    Cordelia Bishop Physical Therapist                   Obj/Interventions       Row Name 05/21/25 1538          Range of Motion Comprehensive    General Range of Motion bilateral lower extremity ROM WFL  -ML       Row Name 05/21/25 1538          Strength Comprehensive (MMT)    General Manual Muscle Testing (MMT)  Assessment lower extremity strength deficits identified  -ML     Comment, General Manual Muscle Testing (MMT) Assessment BLE grossly 4/5  -ML       Row Name 05/21/25 1538          Balance    Balance Assessment sitting static balance;sitting dynamic balance;standing static balance;standing dynamic balance  -ML     Static Sitting Balance independent  -ML     Dynamic Sitting Balance independent  -ML     Position, Sitting Balance unsupported;sitting edge of bed;sitting in chair  -ML     Static Standing Balance standby assist  -ML     Dynamic Standing Balance standby assist  -ML     Position/Device Used, Standing Balance unsupported;supported;walker, 4-wheeled  -ML     Balance Interventions sitting;standing;sit to stand;supported;occupation based/functional task  -ML       Row Name 05/21/25 1538          Sensory Assessment (Somatosensory)    Sensory Assessment (Somatosensory) LE sensation intact  -ML               User Key  (r) = Recorded By, (t) = Taken By, (c) = Cosigned By      Initials Name Provider Type    ML Cordelia Florian Physical Therapist                   Goals/Plan       Row Name 05/21/25 1541          Transfer Goal 1 (PT)    Activity/Assistive Device (Transfer Goal 1, PT) sit-to-stand/stand-to-sit;bed-to-chair/chair-to-bed  -ML     [place] Level/Cues Needed (Transfer Goal 1, PT) independent  -ML     Time Frame (Transfer Goal 1, PT) short term goal (STG);5 days  -ML       Row Name 05/21/25 1541          Gait Training Goal 1 (PT)    Activity/Assistive Device (Gait Training Goal 1, PT) gait (walking locomotion);improve balance and speed;increase endurance/gait distance;other (see comments)  rollator  -ML     [place] Level (Gait Training Goal 1, PT) modified independence  -ML     Distance (Gait Training Goal 1, PT) 200  -ML     Time Frame (Gait Training Goal 1, PT) long term goal (LTG);10 days  -ML       Row Name 05/21/25 1541          Stairs Goal 1 (PT)    Activity/Assistive Device (Stairs Goal 1, PT)  ascending stairs;descending stairs;using handrail, right  -ML     [place] Level/Cues Needed (Stairs Goal 1, PT) standby assist  -ML     Number of Stairs (Stairs Goal 1, PT) 12  -ML     Time Frame (Stairs Goal 1, PT) long term goal (LTG);10 days  -ML       Row Name 05/21/25 1541          Therapy Assessment/Plan (PT)    Planned Therapy Interventions (PT) balance training;gait training;home exercise program;patient/family education;postural re-education;ROM (range of motion);stair training;strengthening;transfer training  -ML               User Key  (r) = Recorded By, (t) = Taken By, (c) = Cosigned By      Initials Name Provider Type    ML Cordelia Florian Physical Therapist                   Clinical Impression       Row Name 05/21/25 1539          Pain    Pretreatment Pain Rating 0/10 - no pain  -ML     Posttreatment Pain Rating 0/10 - no pain  -ML       Row Name 05/21/25 1539          Plan of Care Review    Plan of Care Reviewed With patient  -ML     Outcome Evaluation Physical therapy evaluation complete. The patient with improved stability and gait speed with use of rollator. Patient reports decreased symptoms of dyspnea with rollator. Patient presents below baseline for mobility and would continue to benefit from skilled PT to address strength and activity tolerance deficits. Recommend rollator at hospital discharge.  -ML       Row Name 05/21/25 153          Therapy Assessment/Plan (PT)    Patient/Family Therapy Goals Statement (PT) decrease dyspnea  -ML     Rehab Potential (PT) good  -ML     Criteria for Skilled Interventions Met (PT) yes;meets criteria;skilled treatment is necessary  -ML     Therapy Frequency (PT) daily  -ML     Predicted Duration of Therapy Intervention (PT) 5 days  -ML       Row Name 05/21/25 1539          Vital Signs    Pre SpO2 (%) 87  -ML     O2 Delivery Pre Treatment room air  -ML     Post SpO2 (%) 92  -ML     O2 Delivery Post Treatment room air  -ML       Row Name 05/21/25 1530           Positioning and Restraints    Pre-Treatment Position in bed  -ML     Post Treatment Position chair  -ML     In Chair notified nsg;sitting;call light within reach;encouraged to call for assist;waffle cushion  -ML               User Key  (r) = Recorded By, (t) = Taken By, (c) = Cosigned By      Initials Name Provider Type    Cordelia Bishop Physical Therapist                   Outcome Measures       Row Name 05/21/25 1542          How much help from another person do you currently need...    Turning from your back to your side while in flat bed without using bedrails? 4  -ML     Moving from lying on back to sitting on the side of a flat bed without bedrails? 4  -ML     Moving to and from a bed to a chair (including a wheelchair)? 4  -ML     Standing up from a chair using your arms (e.g., wheelchair, bedside chair)? 4  -ML     Climbing 3-5 steps with a railing? 3  -ML     To walk in hospital room? 3  -ML     AM-PAC 6 Clicks Score (PT) 22  -ML     Highest Level of Mobility Goal Walk 25 Feet or More-7  -ML       Row Name 05/21/25 1542          Functional Assessment    Outcome Measure Options AM-PAC 6 Clicks Basic Mobility (PT)  -ML               User Key  (r) = Recorded By, (t) = Taken By, (c) = Cosigned By      Initials Name Provider Type    Cordelia Bishop Physical Therapist                                 Physical Therapy Education       Title: PT OT SLP Therapies (In Progress)       Topic: Physical Therapy (In Progress)       Point: Mobility training (Done)       Learning Progress Summary            Patient Acceptance, E, VU,NR by  at 5/21/2025 1542    Comment: energy conservation strategies                      Point: Home exercise program (Not Started)       Learner Progress:  Not documented in this visit.              Point: Body mechanics (Done)       Learning Progress Summary            Patient Acceptance, E, VU,NR by  at 5/21/2025 1542    Comment: energy conservation strategies                       Point: Precautions (Done)       Learning Progress Summary            Patient Acceptance, E, VU,NR by  at 5/21/2025 1542    Comment: energy conservation strategies                                      User Key       Initials Effective Dates Name Provider Type Discipline     04/22/21 -  Cordelia Florian Physical Therapist PT                  PT Recommendation and Plan  Planned Therapy Interventions (PT): balance training, gait training, home exercise program, patient/family education, postural re-education, ROM (range of motion), stair training, strengthening, transfer training  Outcome Evaluation: Physical therapy evaluation complete. The patient with improved stability and gait speed with use of rollator. Patient reports decreased symptoms of dyspnea with rollator. Patient presents below baseline for mobility and would continue to benefit from skilled PT to address strength and activity tolerance deficits. Recommend rollator at hospital discharge.     Time Calculation:   PT Evaluation Complexity  History, PT Evaluation Complexity: 1-2 personal factors and/or comorbidities  Examination of Body Systems (PT Eval Complexity): total of 3 or more elements  Clinical Presentation (PT Evaluation Complexity): evolving  Clinical Decision Making (PT Evaluation Complexity): moderate complexity  Overall Complexity (PT Evaluation Complexity): moderate complexity     PT Charges       Row Name 05/21/25 1542             Time Calculation    Start Time 1412  -ML      PT Received On 05/21/25  -ML      PT Goal Re-Cert Due Date 05/31/25  -ML         Timed Charges    01591 - PT Therapeutic Activity Minutes 10  -ML         Untimed Charges    PT Eval/Re-eval Minutes 46  -ML         Total Minutes    Timed Charges Total Minutes 10  -ML      Untimed Charges Total Minutes 46  -ML       Total Minutes 56  -ML                User Key  (r) = Recorded By, (t) = Taken By, (c) = Cosigned By      Initials Name Provider Type    Cordelia Bishop Physical  Therapist                  Therapy Charges for Today       Code Description Service Date Service Provider Modifiers Qty    02753172257 HC PT THERAPEUTIC ACT EA 15 MIN 5/21/2025 Cordelai Florian GP 1    74425787305 HC PT EVAL MOD COMPLEXITY 4 5/21/2025 Cordelia Florian GP 1            PT G-Codes  Outcome Measure Options: AM-PAC 6 Clicks Basic Mobility (PT)  AM-PAC 6 Clicks Score (PT): 22  PT Discharge Summary  Anticipated Discharge Disposition (PT): home with assist    Cordelia Florian  5/21/2025

## 2025-05-22 LAB
ANION GAP SERPL CALCULATED.3IONS-SCNC: 9 MMOL/L (ref 5–15)
BACTERIA SPEC RESP CULT: ABNORMAL
BACTERIA SPEC RESP CULT: ABNORMAL
BUN SERPL-MCNC: 12 MG/DL (ref 8–23)
BUN/CREAT SERPL: 25.5 (ref 7–25)
CALCIUM SPEC-SCNC: 8.4 MG/DL (ref 8.6–10.5)
CHLORIDE SERPL-SCNC: 93 MMOL/L (ref 98–107)
CO2 SERPL-SCNC: 25 MMOL/L (ref 22–29)
CREAT SERPL-MCNC: 0.47 MG/DL (ref 0.57–1)
EGFRCR SERPLBLD CKD-EPI 2021: 107.8 ML/MIN/1.73
GLUCOSE SERPL-MCNC: 159 MG/DL (ref 65–99)
GRAM STN SPEC: ABNORMAL
MAGNESIUM SERPL-MCNC: 1.9 MG/DL (ref 1.6–2.4)
POTASSIUM SERPL-SCNC: 4.4 MMOL/L (ref 3.5–5.2)
SODIUM SERPL-SCNC: 127 MMOL/L (ref 136–145)
SODIUM SERPL-SCNC: 128 MMOL/L (ref 136–145)
SODIUM SERPL-SCNC: 129 MMOL/L (ref 136–145)
SODIUM SERPL-SCNC: 129 MMOL/L (ref 136–145)

## 2025-05-22 PROCEDURE — 25010000002 ENOXAPARIN PER 10 MG: Performed by: NURSE PRACTITIONER

## 2025-05-22 PROCEDURE — 25010000002 METHYLPREDNISOLONE PER 125 MG: Performed by: INTERNAL MEDICINE

## 2025-05-22 PROCEDURE — 80048 BASIC METABOLIC PNL TOTAL CA: CPT | Performed by: STUDENT IN AN ORGANIZED HEALTH CARE EDUCATION/TRAINING PROGRAM

## 2025-05-22 PROCEDURE — 25010000002 CEFTRIAXONE PER 250 MG: Performed by: STUDENT IN AN ORGANIZED HEALTH CARE EDUCATION/TRAINING PROGRAM

## 2025-05-22 PROCEDURE — 94799 UNLISTED PULMONARY SVC/PX: CPT

## 2025-05-22 PROCEDURE — 94761 N-INVAS EAR/PLS OXIMETRY MLT: CPT

## 2025-05-22 PROCEDURE — 84295 ASSAY OF SERUM SODIUM: CPT | Performed by: NURSE PRACTITIONER

## 2025-05-22 PROCEDURE — 99232 SBSQ HOSP IP/OBS MODERATE 35: CPT | Performed by: STUDENT IN AN ORGANIZED HEALTH CARE EDUCATION/TRAINING PROGRAM

## 2025-05-22 PROCEDURE — 94664 DEMO&/EVAL PT USE INHALER: CPT

## 2025-05-22 PROCEDURE — 83735 ASSAY OF MAGNESIUM: CPT | Performed by: STUDENT IN AN ORGANIZED HEALTH CARE EDUCATION/TRAINING PROGRAM

## 2025-05-22 RX ORDER — METHYLPREDNISOLONE SODIUM SUCCINATE 125 MG/2ML
80 INJECTION, POWDER, LYOPHILIZED, FOR SOLUTION INTRAMUSCULAR; INTRAVENOUS DAILY
Status: COMPLETED | OUTPATIENT
Start: 2025-05-23 | End: 2025-05-23

## 2025-05-22 RX ORDER — PREDNISONE 10 MG/1
10 TABLET ORAL
Status: DISCONTINUED | OUTPATIENT
Start: 2025-06-02 | End: 2025-05-24 | Stop reason: HOSPADM

## 2025-05-22 RX ORDER — IPRATROPIUM BROMIDE AND ALBUTEROL SULFATE 2.5; .5 MG/3ML; MG/3ML
3 SOLUTION RESPIRATORY (INHALATION)
Status: DISCONTINUED | OUTPATIENT
Start: 2025-05-22 | End: 2025-05-24 | Stop reason: HOSPADM

## 2025-05-22 RX ORDER — PREDNISONE 20 MG/1
20 TABLET ORAL
Status: DISCONTINUED | OUTPATIENT
Start: 2025-05-30 | End: 2025-05-24 | Stop reason: HOSPADM

## 2025-05-22 RX ORDER — PREDNISONE 5 MG/1
5 TABLET ORAL
Status: DISCONTINUED | OUTPATIENT
Start: 2025-06-05 | End: 2025-05-24 | Stop reason: HOSPADM

## 2025-05-22 RX ORDER — PREDNISONE 20 MG/1
40 TABLET ORAL
Status: DISCONTINUED | OUTPATIENT
Start: 2025-05-24 | End: 2025-05-24 | Stop reason: HOSPADM

## 2025-05-22 RX ADMIN — GUAIFENESIN 600 MG: 600 TABLET, EXTENDED RELEASE ORAL at 21:12

## 2025-05-22 RX ADMIN — SODIUM CHLORIDE 1000 MG: 900 INJECTION INTRAVENOUS at 09:31

## 2025-05-22 RX ADMIN — SODIUM CHLORIDE 1 G: 1 TABLET ORAL at 17:59

## 2025-05-22 RX ADMIN — LEVOTHYROXINE SODIUM 100 MCG: 0.1 TABLET ORAL at 09:31

## 2025-05-22 RX ADMIN — ACETAMINOPHEN 650 MG: 325 TABLET ORAL at 03:25

## 2025-05-22 RX ADMIN — Medication 1 TABLET: at 09:32

## 2025-05-22 RX ADMIN — DOXYCYCLINE 100 MG: 100 CAPSULE ORAL at 09:32

## 2025-05-22 RX ADMIN — ARFORMOTEROL TARTRATE 15 MCG: 15 SOLUTION RESPIRATORY (INHALATION) at 09:12

## 2025-05-22 RX ADMIN — CITALOPRAM 20 MG: 20 TABLET, FILM COATED ORAL at 09:32

## 2025-05-22 RX ADMIN — LOSARTAN POTASSIUM 50 MG: 50 TABLET, FILM COATED ORAL at 09:31

## 2025-05-22 RX ADMIN — SODIUM CHLORIDE 1 G: 1 TABLET ORAL at 12:35

## 2025-05-22 RX ADMIN — IPRATROPIUM BROMIDE AND ALBUTEROL SULFATE 3 ML: 2.5; .5 SOLUTION RESPIRATORY (INHALATION) at 03:09

## 2025-05-22 RX ADMIN — FOLIC ACID 1 MG: 1 TABLET ORAL at 09:32

## 2025-05-22 RX ADMIN — IPRATROPIUM BROMIDE AND ALBUTEROL SULFATE 3 ML: 2.5; .5 SOLUTION RESPIRATORY (INHALATION) at 09:12

## 2025-05-22 RX ADMIN — METHYLPREDNISOLONE SODIUM SUCCINATE 80 MG: 125 INJECTION, POWDER, FOR SOLUTION INTRAMUSCULAR; INTRAVENOUS at 05:35

## 2025-05-22 RX ADMIN — IPRATROPIUM BROMIDE AND ALBUTEROL SULFATE 3 ML: 2.5; .5 SOLUTION RESPIRATORY (INHALATION) at 12:21

## 2025-05-22 RX ADMIN — Medication 5 MG: at 21:18

## 2025-05-22 RX ADMIN — ROSUVASTATIN 40 MG: 20 TABLET, FILM COATED ORAL at 09:31

## 2025-05-22 RX ADMIN — SODIUM CHLORIDE 1 G: 1 TABLET ORAL at 09:32

## 2025-05-22 RX ADMIN — MONTELUKAST 10 MG: 10 TABLET, FILM COATED ORAL at 21:11

## 2025-05-22 RX ADMIN — ARFORMOTEROL TARTRATE 15 MCG: 15 SOLUTION RESPIRATORY (INHALATION) at 19:09

## 2025-05-22 RX ADMIN — DOXYCYCLINE 100 MG: 100 CAPSULE ORAL at 21:11

## 2025-05-22 RX ADMIN — Medication 10 ML: at 21:13

## 2025-05-22 RX ADMIN — BUDESONIDE 0.5 MG: 0.5 SUSPENSION RESPIRATORY (INHALATION) at 19:09

## 2025-05-22 RX ADMIN — BUDESONIDE 0.5 MG: 0.5 SUSPENSION RESPIRATORY (INHALATION) at 09:12

## 2025-05-22 RX ADMIN — Medication 10 ML: at 09:24

## 2025-05-22 RX ADMIN — ACETAMINOPHEN 650 MG: 325 TABLET ORAL at 21:18

## 2025-05-22 RX ADMIN — IPRATROPIUM BROMIDE AND ALBUTEROL SULFATE 3 ML: 2.5; .5 SOLUTION RESPIRATORY (INHALATION) at 19:09

## 2025-05-22 RX ADMIN — FENOFIBRATE 48 MG: 48 TABLET ORAL at 09:25

## 2025-05-22 RX ADMIN — GUAIFENESIN 600 MG: 600 TABLET, EXTENDED RELEASE ORAL at 09:32

## 2025-05-22 RX ADMIN — NICOTINE 1 PATCH: 21 PATCH TRANSDERMAL at 09:32

## 2025-05-22 RX ADMIN — ENOXAPARIN SODIUM 40 MG: 100 INJECTION SUBCUTANEOUS at 09:24

## 2025-05-22 NOTE — PLAN OF CARE
Goal Outcome Evaluation:  Plan of Care Reviewed With: patient        Progress: improving  Outcome Evaluation: PT has been up ad alexandra but have still been standing by when she gets up for the bathroom. PT reports feeling much better, but has complained of some pain in the lower left rib cage. Pain increases with coughing after breathing treatments. The patient has been on room air but had to be put on 2L NC while  sleeeping

## 2025-05-22 NOTE — PLAN OF CARE
Problem: Adult Inpatient Plan of Care  Goal: Plan of Care Review  Outcome: Progressing  Goal: Absence of Hospital-Acquired Illness or Injury  Outcome: Progressing  Intervention: Identify and Manage Fall Risk  Intervention: Prevent Skin Injury  Intervention: Prevent Infection  Goal: Optimal Comfort and Wellbeing  Outcome: Progressing  Intervention: Monitor Pain and Promote Comfort  Intervention: Provide Person-Centered Care  Goal: Readiness for Transition of Care  Outcome: Progressing     Problem: Fall Injury Risk  Goal: Absence of Fall and Fall-Related Injury  Outcome: Progressing  Intervention: Identify and Manage Contributors  Intervention: Promote Injury-Free Environment     Problem: COPD (Chronic Obstructive Pulmonary Disease) Exacerbation  Goal: Optimal Coping with Chronic Illness  Outcome: Progressing  Goal: Optimal Level of Functional [place]  Outcome: Progressing  Intervention: Optimize Functional Ability  Goal: Absence of Infection Signs and Symptoms  Outcome: Progressing  Intervention: Prevent or Manage Infection  Goal: Improved Oral Intake  Outcome: Progressing  Goal: Effective Oxygenation and Ventilation  Outcome: Progressing  Intervention: Promote Airway Secretion Clearance  Intervention: Optimize Oxygenation and Ventilation     Problem: Pneumonia  Goal: Fluid Balance  Outcome: Progressing  Goal: Absence of Infection Signs and Symptoms  Outcome: Progressing  Intervention: Prevent Infection Progression  Goal: Effective Oxygenation and Ventilation  Outcome: Progressing  Intervention: Promote Airway Secretion Clearance  Intervention: Optimize Oxygenation and Ventilation   Goal Outcome Evaluation:  Plan of Care Reviewed With: patient        Progress: improving

## 2025-05-22 NOTE — PROGRESS NOTES
[facility] Medicine Services  PROGRESS NOTE    Patient Name: Florence Dee  : 1960  MRN: [id number]    Date of Admission: 2025  Primary Care Physician: Joann Hankins DO    Subjective   Subjective     CC:  SOB    HPI:  Patient seen resting comfortably in bed in no acute distress.  Continues to report improvement in respiratory status today.  States she is breathing well today.  Minimal wheezing today.      Objective   Objective     Vital Signs:   Temp:  [97.7 °F (36.5 °C)-98.3 °F (36.8 °C)] 97.7 °F (36.5 °C)  Heart Rate:  [63-97] 72  Resp:  [16-20] 18  BP: (125-144)/(82-93) 133/90  Flow (L/min) (Oxygen Therapy):  [1-2] 1     Physical Exam  Cardiovascular:      Rate and Rhythm: Normal rate.      Pulses: Normal pulses.   Pulmonary:      Effort: Pulmonary effort is normal. No respiratory distress.      Comments: Diminished breath sounds throughout  Abdominal:      General: There is no distension.      Palpations: Abdomen is soft.      Tenderness: There is no abdominal tenderness. There is no guarding or rebound.   Musculoskeletal:      Right lower leg: No edema.      Left lower leg: No edema.   Skin:     General: Skin is warm.   Neurological:      Mental Status: She is alert and oriented to person, place, and time.   Psychiatric:         Mood and Affect: Mood normal.         Behavior: Behavior normal.           Results Reviewed:  LAB RESULTS:      Lab 25  0420 25  0506 25  0320   WBC 10.51  --  7.14   HEMOGLOBIN 13.1  --  14.4   HEMATOCRIT 38.7  --  43.2   PLATELETS 236  --  254   NEUTROS ABS 9.37*  --  6.01   IMMATURE GRANS (ABS) 0.05  --  0.04   LYMPHS ABS 0.50*  --  0.55*   MONOS ABS 0.58  --  0.45   EOS ABS 0.00  --  0.07   MCV 99.0*  --  100.0*   LACTATE  --   --  1.3   D DIMER QUANT  --   --  0.63*   HSTROP T  --  <6 <6         Lab 25  1343 25  0732 25  0353 25  0038 25  1944 25  0402 25  0013  05/20/25  0807 05/20/25  0420 05/19/25  0809 05/19/25  0320   SODIUM 129* 129* 128* 127* 129*   < > 125*  125*   < > 125*  125*   < > 125*   POTASSIUM  --   --   --  4.4  --   --  4.4  --  4.3  --  4.2   CHLORIDE  --   --   --  93*  --   --  91*  --  89*  --  88*   CO2  --   --   --  25.0  --   --  25.0  --  26.0  --  26.0   ANION GAP  --   --   --  9.0  --   --  9.0  --  10.0  --  11.0   BUN  --   --   --  12  --   --  11  --  10  --  7*   CREATININE  --   --   --  0.47*  --   --  0.54*  --  0.52*  --  0.57   EGFR  --   --   --  107.8  --   --  104.2  --  105.2  --  102.9   GLUCOSE  --   --   --  159*  --   --  139*  --  145*  --  106*   CALCIUM  --   --   --  8.4*  --   --  8.5*  --  8.6  --  8.4*   MAGNESIUM  --   --   --  1.9  --   --   --   --  2.0  --   --     < > = values in this interval not displayed.         Lab 05/20/25  0420 05/19/25  0320   TOTAL PROTEIN 6.1 6.8   ALBUMIN 3.8 4.3   GLOBULIN 2.3 2.5   ALT (SGPT) 51* 85*   AST (SGOT) 38* 81*   BILIRUBIN 0.3 0.3   ALK PHOS 65 87         Lab 05/19/25  0506 05/19/25  0320   PROBNP  --  36.6   HSTROP T <6 <6                 Lab 05/19/25  0340   PH, ARTERIAL 7.412   PCO2, ARTERIAL 44.1   PO2 ART 76.4*   FIO2 36   HCO3 ART 28.0*   BASE EXCESS ART 2.9*   CARBOXYHEMOGLOBIN 3.2*     Brief Urine Lab Results       None            Microbiology Results Abnormal       Procedure Component Value - Date/Time    Respiratory Culture - Sputum, Cough [[id number]]  (Abnormal)  (Susceptibility) Collected: 05/19/25 8485    Lab Status: Final result Specimen: Sputum from Cough Updated: 05/22/25 0840     Respiratory Culture Heavy growth (4+) Streptococcus pneumoniae      Light growth (2+) Normal Respiratory Mandy     Gram Stain Many (4+) WBCs per low power field      Rare (1+) Epithelial cells per low power field      Many (4+) Gram positive cocci in pairs and chains    Narrative:            Susceptibility        Streptococcus pneumoniae      CEE      Ceftriaxone (Meningitis)  Susceptible      Ceftriaxone (Non-meningitis) Susceptible      Levofloxacin Susceptible      Penicillin (Meningitis) Susceptible      Penicillin (Non-Meningitis) Susceptible                           Respiratory Panel PCR w/COVID-19(SARS-CoV-2) [facility] In-House, NP Swab in UTM/VTM, 2 HR TAT - Swab, Nasopharynx [[id number]]  (Abnormal) Collected: 05/19/25 0327    Lab Status: Final result Specimen: Swab from Nasopharynx Updated: 05/19/25 0609     ADENOVIRUS, PCR Not Detected     Coronavirus 229E Not Detected     Coronavirus HKU1 Not Detected     Coronavirus NL63 Not Detected     Coronavirus OC43 Not Detected     COVID19 Not Detected     Human Metapneumovirus Not Detected     Human Rhinovirus/Enterovirus Not Detected     Influenza A PCR Not Detected     Influenza B PCR Not Detected     Parainfluenza Virus 1 Not Detected     Parainfluenza Virus 2 Not Detected     Parainfluenza Virus 3 Detected     Parainfluenza Virus 4 Not Detected     RSV, PCR Not Detected     Bordetella pertussis pcr Not Detected     Bordetella parapertussis PCR Not Detected     Chlamydophila pneumoniae PCR Not Detected     Mycoplasma pneumo by PCR Not Detected    Narrative:      In the setting of a positive respiratory panel with a viral infection PLUS a negative procalcitonin without other underlying concern for bacterial infection, consider observing off antibiotics or discontinuation of antibiotics and continue supportive care. If the respiratory panel is positive for atypical bacterial infection (Bordetella pertussis, Chlamydophila pneumoniae, or Mycoplasma pneumoniae), consider antibiotic de-escalation to target atypical bacterial infection.            No radiology results from the last 24 hrs          Current medications:  Scheduled Meds:arformoterol, 15 mcg, Nebulization, BID - RT   And  budesonide, 0.5 mg, Nebulization, BID - RT  cefTRIAXone, 1,000 mg, Intravenous, Q24H  citalopram, 20 mg, Oral, Daily  doxycycline, 100 mg,  Oral, Q12H  enoxaparin sodium, 40 mg, Subcutaneous, Daily  fenofibrate, 48 mg, Oral, Daily  folic acid, 1 mg, Oral, Daily  guaiFENesin, 600 mg, Oral, Q12H  ipratropium-albuterol, 3 mL, Nebulization, Q4H - RT  levothyroxine, 100 mcg, Oral, QAM  losartan, 50 mg, Oral, Q24H  [START ON 5/23/2025] methylPREDNISolone sodium succinate, 80 mg, Intravenous, Daily  montelukast, 10 mg, Oral, Nightly  multivitamin with minerals, 1 tablet, Oral, Daily  nicotine, 1 patch, Transdermal, Q24H  rosuvastatin, 40 mg, Oral, Daily  sodium chloride, 10 mL, Intravenous, Q12H  sodium chloride, 1 g, Oral, TID With Meals  thiamine (B-1) IV, 200 mg, Intravenous, Q8H   Followed by  [START ON 5/25/2025] thiamine, 100 mg, Oral, Daily      Continuous Infusions:     PRN Meds:.  acetaminophen **OR** acetaminophen **OR** acetaminophen    benzonatate    senna-docusate sodium **AND** polyethylene glycol **AND** bisacodyl **AND** bisacodyl    ipratropium-albuterol    LORazepam **OR** midazolam **OR** LORazepam **OR** midazolam **OR** midazolam **OR** midazolam    Magnesium Standard Dose Replacement - Follow Nurse / BPA Driven Protocol    melatonin    nitroglycerin    ondansetron ODT **OR** ondansetron    sodium chloride    sodium chloride    sodium chloride    Assessment & Plan   Assessment & Plan     Active Hospital Problems    Diagnosis  POA    **COPD exacerbation [J44.1]  Yes    Hyponatremia [E87.1]  Unknown    Dyslipidemia [E78.5]  Unknown    Parainfluenza virus infection [B34.8]  Unknown    Acute on chronic respiratory failure with hypoxia [J96.21]  Yes    History of cigarette smoking [Z87.891]  Not Applicable    Primary hypertension [I10]  Yes    Pulmonary emphysema [J43.9]  Yes    Acquired hypothyroidism [E03.9]  Yes      Resolved Hospital Problems   No resolved problems to display.        Brief Hospital Course to date:  Florence Dee is a 64 y.o. female with past medical history significant for COPD, hypothyroidism, hypertension,  hyperlipidemia, history of tobacco abuse.  Patient was admitted to the hospital for severe shortness of breath and respiratory failure secondary to parainfluenza infection, strep pneumoniae pneumonia, and COPD exacerbation.  Patient started on IV antibiotics and IV steroids with significant improvement.     Acute hypoxic respiratory failure secondary to parainfluenza infection, multifocal pneumonia, and COPD exacerbation  Ongoing tobacco abuse  - Chest x-ray revealing hyperexpanded lungs  - CTA chest negative for pulmonary embolism, however revealing a multifocal pneumonia, mild emphysema  - Respiratory panel positive for parainfluenza virus  - Respiratory culture positive for strep pneumoniae  PLAN  - Continue ceftriaxone and doxycycline  - Continue inhalers and nebulizers.  Continue IV steroids for now.  Plan to taper with PO steroids.   - Incentive spirometer and flutter valve  - Wean oxygen for goal saturation of 88-92%     Hyponatremia, asymptomatic  - Holding home hydrochlorothiazide  - Will lower dose of citalopram to 20 mg daily.  Start salt tabs.  - Continue every 4 hour sodium checks.       Elevated LFT's  HLD  - Chronically elevated, monitor  - On tricor / statin, continue for now     HTN / HLD  - Continue ARB, hold HCTZ 2/2 low sodium     Hypothyroidism  - Continue levothyroxine     Alcohol abuse  - Start on alcohol withdrawal protocol, CIWA  - Fall precaution     Mood disorder  - Citalopram per above, monitor sodium levels    Aneurysmal dilation of ascending aorta  - Incidental finding, dilatation of 43 mm.  Will need outpatient surveillance.    Expected Discharge Location and Transportation: Home  Expected Discharge   Expected Discharge Date: 5/21/2025; Expected Discharge Time:      VTE Prophylaxis:  Pharmacologic VTE prophylaxis orders are present.         AM-PAC 6 Clicks Score (PT): 22 (05/21/25 2015)    CODE STATUS:   Code Status and Medical Interventions: CPR (Attempt to Resuscitate); Full Support    Ordered at: 05/19/25 0610     Code Status (Patient has no pulse and is not breathing):    CPR (Attempt to Resuscitate)     Medical Interventions (Patient has pulse or is breathing):    Full Support       Colin Almanzar DO  05/22/25

## 2025-05-23 LAB
QT INTERVAL: 362 MS
QTC INTERVAL: 452 MS
SODIUM SERPL-SCNC: 128 MMOL/L (ref 136–145)
SODIUM SERPL-SCNC: 129 MMOL/L (ref 136–145)
T4 FREE SERPL-MCNC: 0.84 NG/DL (ref 0.92–1.68)
TSH SERPL DL<=0.05 MIU/L-ACNC: 7.78 UIU/ML (ref 0.27–4.2)

## 2025-05-23 PROCEDURE — 25010000002 ENOXAPARIN PER 10 MG: Performed by: NURSE PRACTITIONER

## 2025-05-23 PROCEDURE — 25010000002 CEFTRIAXONE PER 250 MG: Performed by: STUDENT IN AN ORGANIZED HEALTH CARE EDUCATION/TRAINING PROGRAM

## 2025-05-23 PROCEDURE — 25010000002 METHYLPREDNISOLONE PER 125 MG: Performed by: STUDENT IN AN ORGANIZED HEALTH CARE EDUCATION/TRAINING PROGRAM

## 2025-05-23 PROCEDURE — 94799 UNLISTED PULMONARY SVC/PX: CPT

## 2025-05-23 PROCEDURE — 84295 ASSAY OF SERUM SODIUM: CPT | Performed by: NURSE PRACTITIONER

## 2025-05-23 PROCEDURE — 94664 DEMO&/EVAL PT USE INHALER: CPT

## 2025-05-23 PROCEDURE — 84439 ASSAY OF FREE THYROXINE: CPT | Performed by: STUDENT IN AN ORGANIZED HEALTH CARE EDUCATION/TRAINING PROGRAM

## 2025-05-23 PROCEDURE — 84443 ASSAY THYROID STIM HORMONE: CPT | Performed by: STUDENT IN AN ORGANIZED HEALTH CARE EDUCATION/TRAINING PROGRAM

## 2025-05-23 PROCEDURE — 99232 SBSQ HOSP IP/OBS MODERATE 35: CPT | Performed by: STUDENT IN AN ORGANIZED HEALTH CARE EDUCATION/TRAINING PROGRAM

## 2025-05-23 PROCEDURE — 94761 N-INVAS EAR/PLS OXIMETRY MLT: CPT

## 2025-05-23 RX ORDER — WATER 10 ML/10ML
INJECTION INTRAMUSCULAR; INTRAVENOUS; SUBCUTANEOUS
Status: COMPLETED
Start: 2025-05-23 | End: 2025-05-23

## 2025-05-23 RX ADMIN — DOXYCYCLINE 100 MG: 100 CAPSULE ORAL at 20:51

## 2025-05-23 RX ADMIN — Medication 5 MG: at 20:51

## 2025-05-23 RX ADMIN — DOXYCYCLINE 100 MG: 100 CAPSULE ORAL at 09:11

## 2025-05-23 RX ADMIN — IPRATROPIUM BROMIDE AND ALBUTEROL SULFATE 3 ML: 2.5; .5 SOLUTION RESPIRATORY (INHALATION) at 01:08

## 2025-05-23 RX ADMIN — CITALOPRAM 20 MG: 20 TABLET, FILM COATED ORAL at 09:11

## 2025-05-23 RX ADMIN — LEVOTHYROXINE SODIUM 100 MCG: 0.1 TABLET ORAL at 06:30

## 2025-05-23 RX ADMIN — METHYLPREDNISOLONE SODIUM SUCCINATE 80 MG: 125 INJECTION, POWDER, FOR SOLUTION INTRAMUSCULAR; INTRAVENOUS at 09:10

## 2025-05-23 RX ADMIN — SODIUM CHLORIDE 1000 MG: 900 INJECTION INTRAVENOUS at 09:11

## 2025-05-23 RX ADMIN — Medication 1 TABLET: at 09:11

## 2025-05-23 RX ADMIN — SODIUM CHLORIDE 1 G: 1 TABLET ORAL at 18:01

## 2025-05-23 RX ADMIN — IPRATROPIUM BROMIDE AND ALBUTEROL SULFATE 3 ML: 2.5; .5 SOLUTION RESPIRATORY (INHALATION) at 20:36

## 2025-05-23 RX ADMIN — IPRATROPIUM BROMIDE AND ALBUTEROL SULFATE 3 ML: 2.5; .5 SOLUTION RESPIRATORY (INHALATION) at 13:55

## 2025-05-23 RX ADMIN — LOSARTAN POTASSIUM 50 MG: 50 TABLET, FILM COATED ORAL at 09:11

## 2025-05-23 RX ADMIN — WATER: 1 INJECTION INTRAMUSCULAR; INTRAVENOUS; SUBCUTANEOUS at 09:13

## 2025-05-23 RX ADMIN — ENOXAPARIN SODIUM 40 MG: 100 INJECTION SUBCUTANEOUS at 09:11

## 2025-05-23 RX ADMIN — ROSUVASTATIN 40 MG: 20 TABLET, FILM COATED ORAL at 09:11

## 2025-05-23 RX ADMIN — FOLIC ACID 1 MG: 1 TABLET ORAL at 09:11

## 2025-05-23 RX ADMIN — ARFORMOTEROL TARTRATE 15 MCG: 15 SOLUTION RESPIRATORY (INHALATION) at 08:06

## 2025-05-23 RX ADMIN — IPRATROPIUM BROMIDE AND ALBUTEROL SULFATE 3 ML: 2.5; .5 SOLUTION RESPIRATORY (INHALATION) at 08:06

## 2025-05-23 RX ADMIN — Medication 10 ML: at 09:11

## 2025-05-23 RX ADMIN — MONTELUKAST 10 MG: 10 TABLET, FILM COATED ORAL at 20:51

## 2025-05-23 RX ADMIN — GUAIFENESIN 600 MG: 600 TABLET, EXTENDED RELEASE ORAL at 09:11

## 2025-05-23 RX ADMIN — STANDARDIZED SENNA CONCENTRATE AND DOCUSATE SODIUM 2 TABLET: 8.6; 5 TABLET, FILM COATED ORAL at 20:58

## 2025-05-23 RX ADMIN — ARFORMOTEROL TARTRATE 15 MCG: 15 SOLUTION RESPIRATORY (INHALATION) at 20:36

## 2025-05-23 RX ADMIN — FENOFIBRATE 48 MG: 48 TABLET ORAL at 09:11

## 2025-05-23 RX ADMIN — Medication 10 ML: at 20:53

## 2025-05-23 RX ADMIN — GUAIFENESIN 600 MG: 600 TABLET, EXTENDED RELEASE ORAL at 20:51

## 2025-05-23 RX ADMIN — BUDESONIDE 0.5 MG: 0.5 SUSPENSION RESPIRATORY (INHALATION) at 08:06

## 2025-05-23 RX ADMIN — BUDESONIDE 0.5 MG: 0.5 SUSPENSION RESPIRATORY (INHALATION) at 20:36

## 2025-05-23 RX ADMIN — SODIUM CHLORIDE 1 G: 1 TABLET ORAL at 12:57

## 2025-05-23 RX ADMIN — NICOTINE 1 PATCH: 21 PATCH TRANSDERMAL at 09:10

## 2025-05-23 RX ADMIN — SODIUM CHLORIDE 1 G: 1 TABLET ORAL at 09:11

## 2025-05-23 NOTE — PLAN OF CARE
Goal Outcome Evaluation:  Plan of Care Reviewed With: patient        Progress: improving  Outcome Evaluation: VSS. RA. No SOB reported. Wheezes still present. No pain. Continue POC.

## 2025-05-23 NOTE — PAYOR COMM NOTE
"Alin Anglin \"\" (64 y.o. Female)     [id number]     Myranda Handy RN  Utilization Review  [phone]  [phone]        Date of Birth   1960    Social Security Number       Address   [address] [place] [address]    Home Phone   [phone]    MRN   [id number]       Yazdanism   None    Marital Status                               Admission Date   2025    Admission Type   Emergency    Admitting Provider   Colin Almanzar DO    Attending Provider   Colin Almanzar DO    Department, Room/Bed   [facility] 6B, N645/1       Discharge Date       Discharge Disposition       Discharge Destination                                 Attending Provider: Colin Almanzar DO    Allergies: No Known Allergies    Isolation: Droplet, Contact   Infection: Other (25)   Code Status: CPR    Ht: 162.6 cm (64\")   Wt: 58.1 kg (128 lb)    Admission Cmt: None   Principal Problem: COPD exacerbation [J44.1]                   Active Insurance as of 2025       Primary Coverage       Payor Plan Insurance Group Employer/Plan Group    [facility] [id number]       Payor Plan Address Payor Plan Phone Number Payor Plan Fax Number Effective Dates    [address] [address]   2016 - None Entered    [facility] [id number]         Subscriber Name Subscriber Birth Date Member ID       ALIN ANGLIN 1960 [id number]                     Emergency Contacts        (Rel.) Home Phone Work Phone Mobile Phone    Remi Anglin (Spouse) [phone]                 History & Physical        Jenni Bryan APRN at 25 0610       Attestation signed by Pedro Luis Lauren DO at 25 0602      I have reviewed this documentation and agree.                          [facility]  HISTORY AND PHYSICAL    Patient Name: Alin Anglin  : 1960  MRN: [id number]  Primary Care Physician: Cr" Joann Kang DO  Date of admission: 5/19/2025    Subjective  Subjective     Chief Complaint:  Shortness of breath    HPI:  Florence Dee is a 64 y.o. female with PMHx of emphysema, hypothyroidism, HTN, HLD and prior tobacco use who presents to the ED for evaluation of shortness of breath. Symptoms began Saturday with the change in weather (this is a trigger for her typically), slept with the window open and developed congested cough, wheezing and shortness of breath. Has been using neb treatments at home and felt better during the day today but tonight felt worse again so presents to the ED. Denied fever / chills at home. No known sick contacts.    Arrived in the ED requiring NRB mask, has been weaned down to 5 liters NC. Does not wear home oxygen. Had temp 100.4 as well. Respiratory PCR is positive for parainfluenza virus. Chest xray w/ hyperexpanded lungs w/ interstitial prominence. She is hypoxic on ABG w/ pO2 76.4. Sodium 125, she is on Hctz and citalopram. AST / ALT elevated but this appears chronic. She will be admitted to hospital medicine for further management.    Review of Systems   Constitutional:  Positive for activity change, appetite change and fatigue. Negative for chills and fever.   HENT:  Positive for congestion. Negative for trouble swallowing.    Respiratory:  Positive for cough, shortness of breath and wheezing.    Cardiovascular: Negative.    Psychiatric/Behavioral:  Positive for sleep disturbance.    All other systems reviewed and are negative.           Personal History     Past Medical History:   Diagnosis Date    Disease of thyroid gland     Graves disease     Hypertension              Past Surgical History:   Procedure Laterality Date    ORBITAL DECOMPRESSION         Family History:   family history includes Hypertension in her father and mother; Stroke in her father and mother.     Social History:  reports that she quit smoking about 5 years ago. Her smoking use included cigarettes.  She started smoking about 30 years ago. She has a 12.5 pack-year smoking history. She has never used smokeless tobacco. She reports current alcohol use of about 5.0 - 6.0 standard drinks of alcohol per week. She reports that she does not use drugs.  Social History     Social History Narrative    Not on file       Medications:  albuterol, albuterol sulfate HFA, citalopram, fenofibrate, levothyroxine, montelukast, olmesartan-hydrochlorothiazide, and rosuvastatin    No Known Allergies    Objective  Objective     Vital Signs:   Temp:  [100.4 °F (38 °C)] 100.4 °F (38 °C)  Heart Rate:  [] 93  Resp:  [18-20] 20  BP: (116-149)/(71-89) 116/71  Flow (L/min) (Oxygen Therapy):  [5-15] 5    Physical Exam  Vitals reviewed.   Constitutional:       General: She is not in acute distress.     Appearance: She is well-developed.   HENT:      Head: Normocephalic and atraumatic.      Nose: Nose normal.      Mouth/Throat:      Pharynx: Oropharynx is clear.   Eyes:      Extraocular Movements: Extraocular movements intact.      Conjunctiva/sclera: Conjunctivae normal.      Pupils: Pupils are equal, round, and reactive to light.   Cardiovascular:      Rate and Rhythm: Normal rate and regular rhythm.      Pulses: Normal pulses.      Heart sounds: Normal heart sounds. No murmur heard.  Pulmonary:      Effort: Tachypnea, accessory muscle usage and respiratory distress present.      Breath sounds: Wheezing present.   Abdominal:      General: Bowel sounds are normal. There is no distension.      Palpations: Abdomen is soft.      Tenderness: There is no abdominal tenderness.   Musculoskeletal:         General: No swelling. Normal range of motion.      Cervical back: Normal range of motion and neck supple.   Skin:     General: Skin is warm and dry.      Capillary Refill: Capillary refill takes less than 2 seconds.      Comments: Flushed appearance     Neurological:      Mental Status: She is alert and oriented to person, place, and time.       Cranial Nerves: No cranial nerve deficit.   Psychiatric:         Mood and Affect: Mood normal.         Behavior: Behavior normal.             Result Review:  I have personally reviewed the results from the time of this admission to 5/19/2025 06:26 EDT and agree with these findings:  [x]  Laboratory list / accordion  [x]  Microbiology  [x]  Radiology  [x]  EKG/Telemetry   []  Cardiology/Vascular   []  Pathology  []  Old records  []  Other:  Most notable findings include:      LAB RESULTS:      Lab 05/19/25  0320   WBC 7.14   HEMOGLOBIN 14.4   HEMATOCRIT 43.2   PLATELETS 254   NEUTROS ABS 6.01   IMMATURE GRANS (ABS) 0.04   LYMPHS ABS 0.55*   MONOS ABS 0.45   EOS ABS 0.07   .0*   LACTATE 1.3         Lab 05/19/25  0320   SODIUM 125*   POTASSIUM 4.2   CHLORIDE 88*   CO2 26.0   ANION GAP 11.0   BUN 7*   CREATININE 0.57   EGFR 102.9   GLUCOSE 106*   CALCIUM 8.4*         Lab 05/19/25  0320   TOTAL PROTEIN 6.8   ALBUMIN 4.3   GLOBULIN 2.5   ALT (SGPT) 85*   AST (SGOT) 81*   BILIRUBIN 0.3   ALK PHOS 87         Lab 05/19/25  0506 05/19/25  0320   PROBNP  --  36.6   HSTROP T <6 <6                 Lab 05/19/25  0340   PH, ARTERIAL 7.412   PCO2, ARTERIAL 44.1   PO2 ART 76.4*   FIO2 36   HCO3 ART 28.0*   BASE EXCESS ART 2.9*   CARBOXYHEMOGLOBIN 3.2*     Brief Urine Lab Results       None          Microbiology Results (last 10 days)       Procedure Component Value - Date/Time    Respiratory Panel PCR w/COVID-19(SARS-CoV-2) [facility] In-House, NP Swab in UTM/VTM, 2 HR TAT - Swab, Nasopharynx [[id number]]  (Abnormal) Collected: 05/19/25 0327    Lab Status: Final result Specimen: Swab from Nasopharynx Updated: 05/19/25 0609     ADENOVIRUS, PCR Not Detected     Coronavirus 229E Not Detected     Coronavirus HKU1 Not Detected     Coronavirus NL63 Not Detected     Coronavirus OC43 Not Detected     COVID19 Not Detected     Human Metapneumovirus Not Detected     Human Rhinovirus/Enterovirus Not Detected     Influenza A  PCR Not Detected     Influenza B PCR Not Detected     Parainfluenza Virus 1 Not Detected     Parainfluenza Virus 2 Not Detected     Parainfluenza Virus 3 Detected     Parainfluenza Virus 4 Not Detected     RSV, PCR Not Detected     Bordetella pertussis pcr Not Detected     Bordetella parapertussis PCR Not Detected     Chlamydophila pneumoniae PCR Not Detected     Mycoplasma pneumo by PCR Not Detected    Narrative:      In the setting of a positive respiratory panel with a viral infection PLUS a negative procalcitonin without other underlying concern for bacterial infection, consider observing off antibiotics or discontinuation of antibiotics and continue supportive care. If the respiratory panel is positive for atypical bacterial infection (Bordetella pertussis, Chlamydophila pneumoniae, or Mycoplasma pneumoniae), consider antibiotic de-escalation to target atypical bacterial infection.            XR Chest 1 View  Result Date: 5/19/2025  XR CHEST 1 VW Date of Exam: 5/19/2025 3:11 AM EDT Indication: SOA triage protocol Comparison: None available. Findings: The lungs are hyperexpanded. There is interstitial prominence. There is no pneumothorax, pleural effusion or focal airspace consolidation. Heart size and pulmonary vasculature appear within normal limits. Regional bones appear intact.     Impression: Impression: Hyperexpanded lungs with interstitial prominence. Correlate for COPD. Electronically Signed: Marshall Craig MD  5/19/2025 4:18 AM EDT  Workstation ID: [id number]          Assessment & Plan  Assessment & Plan       COPD exacerbation    Acquired hypothyroidism    History of cigarette smoking    Primary hypertension    Pulmonary emphysema    Hyponatremia    Dyslipidemia    Parainfluenza virus infection    COPD exacerbation / parainfluenza viral infection  Chronic pulmonary emphysema / Hx smoking  - hypoxic, requiring 5 liters NC w/ increased work of breathing  - d dimer pending, if elevated obtain CTA chest to  r/o PE  - respiratory pcr positive for parainfluenza  - continue doxycycline, started in ED  - tre nebs scheduled, duo nebs prn  - methylprednisolone 125 mg in ED, continue 62.5 mg daily x 5 days  - pulmonary toilet / IS / flutter valve  - add tessalon PRN, mucinex BID  - sputum culture ordered  - wean oxygen as tolerated    Hyponatremia  - hold hctz  - also on citalopram, continuing for now  - check urine sodium, urine osmolality, serum osmolality  - gentle fluids  - repeat sodium Q 4 hours    Elevated LFT's  - chronically elevated, monitor  - on tricor / statin, continue for now    HTN / HLD  - continue ARB, hold HCTZ r/t low sodium  - continue tricor / statin, monitor LFT's    Hypothyroidism  - continue levothyroxine    Mood disorder  - continue citalopram, monitor sodium levels        DVT prophylaxis:  Lovenox    CODE STATUS:     Code Status (Patient has no pulse and is not breathing): CPR (Attempt to Resuscitate)  Medical Interventions (Patient has pulse or is breathing): Full Support      Expected Discharge     Expected Discharge Date: 5/21/2025; Expected Discharge Time:       Signature: Electronically signed by OSITO Trevino, 05/19/25, 6:26 AM EDT      Total time spent: 50 minutes  Time spent includes time reviewing chart, face-to-face time, counseling patient/family/caregiver, ordering medications/tests/procedures, communicating with other health care professionals, documenting clinical information in the electronic health record, and coordination of care.            Electronically signed by Pedro Luis Lauren DO at 05/19/25 0634       Vital Signs (last 5 days)       Date/Time Temp Temp src Pulse Resp BP Patient Position SpO2    05/23/25 0806 -- -- 67 18 -- Lying 96    05/23/25 0427 97.7 (36.5) Oral 70 20 142/92 Lying 94    05/23/25 0117 97.7 (36.5) Oral 76 16 139/93 Lying 94    05/23/25 0108 -- -- 82 16 -- -- 94    05/22/25 2006 98 (36.7) Oral -- 16 147/100 Sitting 90    05/22/25 1909 -- -- 94 16  -- -- 90    05/22/25 1900 -- -- 77 -- -- -- 92    05/22/25 1800 -- -- 88 16 -- -- 92    05/22/25 1700 -- -- 80 -- -- -- 95    05/22/25 1600 -- -- 86 16 -- -- 94    05/22/25 1500 -- -- 82 -- -- -- 91    05/22/25 1455 -- -- 71 18 139/89 Lying 93    05/22/25 1400 -- -- 82 -- -- -- 93    05/22/25 1300 -- -- 83 -- -- -- --    05/22/25 1221 -- -- 72 18 -- -- 91    05/22/25 1200 -- -- 76 16 -- -- 92    05/22/25 1100 -- -- 89 -- -- -- 88    05/22/25 1045 97.7 (36.5) Oral 83 18 133/90 Lying 92    05/22/25 1000 -- -- 77 18 -- -- 92    05/22/25 0912 -- -- 73 -- -- -- 91    05/22/25 0905 -- -- 78 -- -- -- 89    05/22/25 0900 -- -- 80 -- -- -- 87    05/22/25 0800 97.8 (36.6) Oral 77 18 144/86 Sitting 95    05/22/25 0700 -- -- 63 -- -- -- 96    05/22/25 0318 97.7 (36.5) Oral 68 18 132/84 Lying 99    05/22/25 0309 -- -- -- 18 -- -- --    05/22/25 0015 97.7 (36.5) Oral 71 20 128/82 Lying 93    05/21/25 2338 -- -- -- 20 -- -- --    05/21/25 2019 -- -- -- 20 -- -- --    05/21/25 1955 98.2 (36.8) Oral 76 20 136/93 Lying 91    05/21/25 1845 -- -- 78 -- -- -- 90    05/21/25 1645 -- -- 82 -- -- -- 94    05/21/25 1615 -- -- 82 -- -- -- 100    05/21/25 1607 -- -- 97 18 -- -- 90    05/21/25 1541 98.3 (36.8) Oral -- 18 125/84 Sitting --    05/21/25 1208 -- -- 91 18 -- -- 92    05/21/25 1119 98 (36.7) Oral -- 20 144/85 Sitting 92    05/21/25 0900 -- -- 79 -- 136/84 -- 95    05/21/25 0840 -- -- 73 18 -- -- 94    05/21/25 0415 -- -- 65 17 -- -- 99    05/21/25 0331 98.3 (36.8) Oral 65 16 121/84 Lying 98    05/21/25 0031 -- -- 80 16 -- Lying 98    05/20/25 2319 98.3 (36.8) Oral 74 18 124/92 Lying 97    05/20/25 2300 -- -- 88 -- -- -- 94    05/20/25 2130 -- -- 81 16 -- Lying 96    05/20/25 2100 -- -- 88 -- -- -- 96    05/20/25 2004 98.7 (37.1) Oral 80 18 137/94 Lying 96    05/20/25 1800 -- -- 89 -- -- -- 94    05/20/25 1700 -- -- 90 -- -- -- 99    05/20/25 1617 -- -- 84 18 -- Lying 95    05/20/25 1600 -- -- 75 -- -- -- 96    05/20/25 1517 98.3  (36.8) Oral -- 18 131/86 Lying 94    05/20/25 1500 -- -- 81 -- -- -- 94    05/20/25 1400 -- -- 78 -- -- -- 95    05/20/25 1300 -- -- 95 -- -- -- 95    05/20/25 1259 -- -- 95 -- -- -- 95    05/20/25 1200 -- -- 77 -- -- -- 96    05/20/25 1100 -- -- 82 -- -- -- 98    05/20/25 1047 98.1 (36.7) Oral 90 18 121/88 Lying --    05/20/25 1000 -- -- 84 -- -- -- 96    05/20/25 0900 -- -- 79 -- -- -- 92    05/20/25 0859 -- -- 79 18 -- -- 92    05/20/25 0843 97.7 (36.5) Axillary -- 18 128/84 Sitting --    05/20/25 0800 -- -- 70 -- -- -- 95    05/20/25 0700 -- -- 75 -- -- -- 98    05/20/25 0408 98.1 (36.7) Oral 73 16 124/84 Lying 98    05/20/25 0127 -- -- 76 16 -- -- 97    05/20/25 0100 -- -- 80 -- -- -- 97    05/19/25 2338 98.7 (37.1) Oral 92 18 132/84 Lying 96    05/19/25 2300 -- -- 94 -- -- -- 94    05/19/25 2219 98.7 (37.1) Oral -- -- -- -- --    05/19/25 2128 -- -- 109 18 -- -- 96    05/19/25 2112 101 (38.3) Oral -- 22 134/99 Lying 95    05/19/25 1800 -- -- 100 -- -- -- 96    05/19/25 1729 -- -- -- 24 -- -- --    05/19/25 1700 -- -- 88 -- -- -- 95    05/19/25 1600 -- -- 131 -- -- -- 96    05/19/25 1504 98.6 (37) Oral -- 20 129/85 Lying --    05/19/25 1500 -- -- 86 -- 129/85 -- 98    05/19/25 1400 -- -- 82 -- -- -- 95    05/19/25 1300 -- -- 82 -- -- -- 95    05/19/25 1200 -- -- 78 -- -- -- 95    05/19/25 1125 -- -- -- 22 -- -- --    05/19/25 1119 98 (36.7) Oral -- 18 147/93 Lying 94    05/19/25 1100 -- -- 77 -- -- -- 95    05/19/25 1000 -- -- 86 -- -- -- 92    05/19/25 0900 -- -- 80 -- -- -- 95    05/19/25 0804 98.2 (36.8) Oral -- 18 131/87 Sitting 97    05/19/25 0730 -- -- 81 -- 122/80 -- 94    05/19/25 0700 -- -- 87 -- 121/77 -- 95    05/19/25 0630 -- -- 92 -- 125/83 -- 94    05/19/25 0600 -- -- 93 -- 116/71 -- 94    05/19/25 0530 -- -- 104 -- 129/84 -- 95    05/19/25 0500 -- -- 97 -- 148/88 -- 97    05/19/25 0446 -- -- -- 20 -- -- --    05/19/25 0430 -- -- 94 -- 136/80 -- 98    05/19/25 0400 -- -- 105 -- 149/89 -- 97     05/19/25 0330 -- -- 96 -- 137/87 -- 95    05/19/25 0329 -- -- -- 20 -- -- --    05/19/25 0300 -- -- 112 -- -- -- 100    05/19/25 0253 100.4 (38) Oral 96 18 146/84 Lying --    05/19/25 0251 -- -- -- -- -- -- 100          Oxygen Therapy (last 5 days)       Date/Time SpO2 Device (Oxygen Therapy) Flow (L/min) (Oxygen Therapy) Oxygen Concentration (%) ETCO2 (mmHg)    05/23/25 0806 96 nasal cannula 2 -- --    05/23/25 0634 -- nasal cannula 2 -- --    05/23/25 0427 94 -- -- -- --    05/23/25 0412 -- nasal cannula 2 -- --    05/23/25 0212 -- nasal cannula 2 -- --    05/23/25 0117 94 -- -- -- --    05/23/25 0108 94 nasal cannula 2 -- --    05/23/25 0030 -- nasal cannula 2 -- --    05/22/25 2220 -- nasal cannula 2 -- --    05/22/25 2020 -- nasal cannula 2 -- --    05/22/25 2006 90 nasal cannula 1 -- --    05/22/25 1909 90 nasal cannula 0.5 -- --    05/22/25 1900 92 -- -- -- --    05/22/25 1800 92 nasal cannula;humidified 0.5 -- --    05/22/25 1700 95 -- -- -- --    05/22/25 1600 94 nasal cannula;humidified 1 -- --    05/22/25 1500 91 -- -- -- --    05/22/25 1455 93 nasal cannula;humidified 1 -- --    05/22/25 1400 93 nasal cannula;humidified 1 -- --    05/22/25 1221 91 humidified;nasal cannula 1 -- --    05/22/25 1200 92 nasal cannula;humidified 1 -- --    05/22/25 1100 88 -- -- -- --    05/22/25 1045 92 nasal cannula;humidified 1 -- --    05/22/25 1000 92 nasal cannula;humidified 1 -- --    05/22/25 0912 91 nasal cannula;humidified 1 -- --    05/22/25 0905 89 nasal cannula;humidified 1 -- --    05/22/25 0900 87 room air -- -- --    05/22/25 0800 95 nasal cannula;humidified 2 -- --    05/22/25 0700 96 -- -- -- --    05/22/25 0410 -- nasal cannula 2 -- --    05/22/25 0318 99 -- -- -- --    05/22/25 0309 -- nasal cannula 2 -- --    05/22/25 0022 -- nasal cannula 2 -- --    05/22/25 0015 93 -- -- -- --    05/21/25 2338 -- nasal cannula 2 -- --    05/21/25 2019 -- room air -- -- --    05/21/25 2015 -- room air -- -- --     05/21/25 1955 91 room air -- -- --    05/21/25 1845 90 -- -- -- --    05/21/25 1645 94 -- -- -- --    05/21/25 1615 100 -- -- -- --    05/21/25 1607 90 -- -- -- --    05/21/25 1208 92 room air -- -- --    05/21/25 1119 92 -- -- -- --    05/21/25 1000 -- room air -- -- --    05/21/25 0920 -- room air -- -- --    05/21/25 0900 95 -- 1 -- --    05/21/25 0840 94 humidified;nasal cannula 2 -- --    05/21/25 0800 -- nasal cannula -- -- --    05/21/25 0415 99 humidified;nasal cannula 2 -- --    05/21/25 0400 -- nasal cannula;humidified 2 -- --    05/21/25 0331 98 -- -- -- --    05/21/25 0031 98 bubble;nasal cannula 2 -- --    05/20/25 2319 97 -- -- -- --    05/20/25 2300 94 -- -- -- --    05/20/25 2130 96 nasal cannula 2 -- --    05/20/25 2100 96 -- -- -- --    05/20/25 2025 -- nasal cannula 2 -- --    05/20/25 2004 96 -- -- -- --    05/20/25 1800 94 nasal cannula 2 -- --    05/20/25 1700 99 -- -- -- --    05/20/25 1617 95 nasal cannula 2 -- --    05/20/25 1600 96 nasal cannula 2 -- --    05/20/25 1517 94 -- -- -- --    05/20/25 1500 94 -- -- -- --    05/20/25 1400 95 nasal cannula 2 -- --    05/20/25 1300 95 -- -- -- --    05/20/25 1259 95 nasal cannula;humidified 2 -- --    05/20/25 1200 96 nasal cannula 2 -- --    05/20/25 1100 98 -- -- -- --    05/20/25 1000 96 nasal cannula 2 -- --    05/20/25 0900 92 -- -- -- --    05/20/25 0859 92 nasal cannula;humidified 2 -- --    05/20/25 0843 -- nasal cannula;humidified 2 -- --    05/20/25 0800 95 nasal cannula;humidified 2 -- --    05/20/25 0713 -- humidified;nasal cannula 2 -- --    05/20/25 0700 98 -- -- -- --    05/20/25 0418 -- nasal cannula;humidified 3 -- --    05/20/25 0408 98 -- -- -- --    05/20/25 0215 -- nasal cannula;humidified 4 -- --    05/20/25 0127 97 nasal cannula 4 -- --    05/20/25 0100 97 -- -- -- --    05/20/25 0000 -- humidified;nasal cannula 4 -- --    05/19/25 2338 96 -- -- -- --    05/19/25 2300 94 -- -- -- --    05/19/25 2128 96 nasal cannula 4 -- --     25 2112 95 nasal cannula;humidified 4 -- --    25 2025 -- nasal cannula 4 -- --    25 1800 96 nasal cannula 4 -- --    25 1729 -- -- 3.5 -- --    25 1700 95 -- -- -- --    25 1600 96 nasal cannula 3.5 -- --    25 1500 98 -- -- -- --    25 1400 95 nasal cannula 4 -- --    25 1300 95 -- -- -- --    25 1200 95 nasal cannula 2 -- --    25 1140 -- -- 4 -- --    25 1125 -- nasal cannula 4 -- --    25 1119 94 -- -- -- --    25 1100 95 -- -- -- --    25 1000 92 nasal cannula 3 -- --    25 0900 95 -- -- -- --    25 0804 97 nasal cannula 3 -- --    25 0730 94 -- -- -- --    25 0700 95 -- -- -- --    25 0630 94 -- -- -- --    25 0600 94 -- -- -- --    25 0530 95 -- -- -- --    25 0500 97 -- -- -- --    25 0446 -- nasal cannula 5 -- --    25 0430 98 -- -- -- --    25 0400 97 -- -- -- --    25 0330 95 -- -- -- --    25 0329 -- nasal cannula 5 -- --    25 0300 100 -- -- -- --    25 0251 100 nonrebreather mask 15 -- --          Lines, Drains & Airways       Active LDAs       Name Placement date Placement time Site Days    Peripheral IV 25 1115 20 G Anterior;Left;Proximal Forearm 25  1115  Forearm  2                     Physician Progress Notes (all)        Colin Almanzar DO at 25 1445              [facility] Medicine Services  PROGRESS NOTE    Patient Name: Florence Dee  : 1960  MRN: [id number]    Date of Admission: 2025  Primary Care Physician: Joann Hankins DO    Subjective   Subjective     CC:  SOB    HPI:  Patient seen resting comfortably in bed in no acute distress.  Continues to report improvement in respiratory status today.  States she is breathing well today.  Minimal wheezing today.      Objective   Objective     Vital Signs:   Temp:  [97.7 °F (36.5 °C)-98.3 °F (36.8 °C)] 97.7 °F  (36.5 °C)  Heart Rate:  [63-97] 72  Resp:  [16-20] 18  BP: (125-144)/(82-93) 133/90  Flow (L/min) (Oxygen Therapy):  [1-2] 1     Physical Exam  Cardiovascular:      Rate and Rhythm: Normal rate.      Pulses: Normal pulses.   Pulmonary:      Effort: Pulmonary effort is normal. No respiratory distress.      Comments: Diminished breath sounds throughout  Abdominal:      General: There is no distension.      Palpations: Abdomen is soft.      Tenderness: There is no abdominal tenderness. There is no guarding or rebound.   Musculoskeletal:      Right lower leg: No edema.      Left lower leg: No edema.   Skin:     General: Skin is warm.   Neurological:      Mental Status: She is alert and oriented to person, place, and time.   Psychiatric:         Mood and Affect: Mood normal.         Behavior: Behavior normal.           Results Reviewed:  LAB RESULTS:      Lab 05/20/25  0420 05/19/25  0506 05/19/25  0320   WBC 10.51  --  7.14   HEMOGLOBIN 13.1  --  14.4   HEMATOCRIT 38.7  --  43.2   PLATELETS 236  --  254   NEUTROS ABS 9.37*  --  6.01   IMMATURE GRANS (ABS) 0.05  --  0.04   LYMPHS ABS 0.50*  --  0.55*   MONOS ABS 0.58  --  0.45   EOS ABS 0.00  --  0.07   MCV 99.0*  --  100.0*   LACTATE  --   --  1.3   D DIMER QUANT  --   --  0.63*   HSTROP T  --  <6 <6         Lab 05/22/25  1343 05/22/25  0732 05/22/25  0353 05/22/25  0038 05/21/25  1944 05/21/25  0402 05/21/25  0013 05/20/25  0807 05/20/25  0420 05/19/25  0809 05/19/25  0320   SODIUM 129* 129* 128* 127* 129*   < > 125*  125*   < > 125*  125*   < > 125*   POTASSIUM  --   --   --  4.4  --   --  4.4  --  4.3  --  4.2   CHLORIDE  --   --   --  93*  --   --  91*  --  89*  --  88*   CO2  --   --   --  25.0  --   --  25.0  --  26.0  --  26.0   ANION GAP  --   --   --  9.0  --   --  9.0  --  10.0  --  11.0   BUN  --   --   --  12  --   --  11  --  10  --  7*   CREATININE  --   --   --  0.47*  --   --  0.54*  --  0.52*  --  0.57   EGFR  --   --   --  107.8  --   --  104.2  --   105.2  --  102.9   GLUCOSE  --   --   --  159*  --   --  139*  --  145*  --  106*   CALCIUM  --   --   --  8.4*  --   --  8.5*  --  8.6  --  8.4*   MAGNESIUM  --   --   --  1.9  --   --   --   --  2.0  --   --     < > = values in this interval not displayed.         Lab 05/20/25  0420 05/19/25  0320   TOTAL PROTEIN 6.1 6.8   ALBUMIN 3.8 4.3   GLOBULIN 2.3 2.5   ALT (SGPT) 51* 85*   AST (SGOT) 38* 81*   BILIRUBIN 0.3 0.3   ALK PHOS 65 87         Lab 05/19/25  0506 05/19/25  0320   PROBNP  --  36.6   HSTROP T <6 <6                 Lab 05/19/25  0340   PH, ARTERIAL 7.412   PCO2, ARTERIAL 44.1   PO2 ART 76.4*   FIO2 36   HCO3 ART 28.0*   BASE EXCESS ART 2.9*   CARBOXYHEMOGLOBIN 3.2*     Brief Urine Lab Results       None            Microbiology Results Abnormal       Procedure Component Value - Date/Time    Respiratory Culture - Sputum, Cough [[id number]]  (Abnormal)  (Susceptibility) Collected: 05/19/25 1514    Lab Status: Final result Specimen: Sputum from Cough Updated: 05/22/25 0840     Respiratory Culture Heavy growth (4+) Streptococcus pneumoniae      Light growth (2+) Normal Respiratory Mandy     Gram Stain Many (4+) WBCs per low power field      Rare (1+) Epithelial cells per low power field      Many (4+) Gram positive cocci in pairs and chains    Narrative:            Susceptibility        Streptococcus pneumoniae      CEE      Ceftriaxone (Meningitis) Susceptible      Ceftriaxone (Non-meningitis) Susceptible      Levofloxacin Susceptible      Penicillin (Meningitis) Susceptible      Penicillin (Non-Meningitis) Susceptible                           Respiratory Panel PCR w/COVID-19(SARS-CoV-2) [facility] In-House, NP Swab in [facility], 2 HR TAT - Swab, Nasopharynx [[id number]]  (Abnormal) Collected: 05/19/25 0327    Lab Status: Final result Specimen: Swab from Nasopharynx Updated: 05/19/25 0609     ADENOVIRUS, PCR Not Detected     Coronavirus 229E Not Detected     Coronavirus HKU1 Not Detected      Coronavirus NL63 Not Detected     Coronavirus OC43 Not Detected     COVID19 Not Detected     Human Metapneumovirus Not Detected     Human Rhinovirus/Enterovirus Not Detected     Influenza A PCR Not Detected     Influenza B PCR Not Detected     Parainfluenza Virus 1 Not Detected     Parainfluenza Virus 2 Not Detected     Parainfluenza Virus 3 Detected     Parainfluenza Virus 4 Not Detected     RSV, PCR Not Detected     Bordetella pertussis pcr Not Detected     Bordetella parapertussis PCR Not Detected     Chlamydophila pneumoniae PCR Not Detected     Mycoplasma pneumo by PCR Not Detected    Narrative:      In the setting of a positive respiratory panel with a viral infection PLUS a negative procalcitonin without other underlying concern for bacterial infection, consider observing off antibiotics or discontinuation of antibiotics and continue supportive care. If the respiratory panel is positive for atypical bacterial infection (Bordetella pertussis, Chlamydophila pneumoniae, or Mycoplasma pneumoniae), consider antibiotic de-escalation to target atypical bacterial infection.            No radiology results from the last 24 hrs          Current medications:  Scheduled Meds:arformoterol, 15 mcg, Nebulization, BID - RT   And  budesonide, 0.5 mg, Nebulization, BID - RT  cefTRIAXone, 1,000 mg, Intravenous, Q24H  citalopram, 20 mg, Oral, Daily  doxycycline, 100 mg, Oral, Q12H  enoxaparin sodium, 40 mg, Subcutaneous, Daily  fenofibrate, 48 mg, Oral, Daily  folic acid, 1 mg, Oral, Daily  guaiFENesin, 600 mg, Oral, Q12H  ipratropium-albuterol, 3 mL, Nebulization, Q4H - RT  levothyroxine, 100 mcg, Oral, QAM  losartan, 50 mg, Oral, Q24H  [START ON 5/23/2025] methylPREDNISolone sodium succinate, 80 mg, Intravenous, Daily  montelukast, 10 mg, Oral, Nightly  multivitamin with minerals, 1 tablet, Oral, Daily  nicotine, 1 patch, Transdermal, Q24H  rosuvastatin, 40 mg, Oral, Daily  sodium chloride, 10 mL, Intravenous, Q12H  sodium  chloride, 1 g, Oral, TID With Meals  thiamine (B-1) IV, 200 mg, Intravenous, Q8H   Followed by  [START ON 5/25/2025] thiamine, 100 mg, Oral, Daily      Continuous Infusions:     PRN Meds:.  acetaminophen **OR** acetaminophen **OR** acetaminophen    benzonatate    senna-docusate sodium **AND** polyethylene glycol **AND** bisacodyl **AND** bisacodyl    ipratropium-albuterol    LORazepam **OR** midazolam **OR** LORazepam **OR** midazolam **OR** midazolam **OR** midazolam    Magnesium Standard Dose Replacement - Follow Nurse / BPA Driven Protocol    melatonin    nitroglycerin    ondansetron ODT **OR** ondansetron    sodium chloride    sodium chloride    sodium chloride    Assessment & Plan   Assessment & Plan     Active Hospital Problems    Diagnosis  POA    **COPD exacerbation [J44.1]  Yes    Hyponatremia [E87.1]  Unknown    Dyslipidemia [E78.5]  Unknown    Parainfluenza virus infection [B34.8]  Unknown    Acute on chronic respiratory failure with hypoxia [J96.21]  Yes    History of cigarette smoking [Z87.891]  Not Applicable    Primary hypertension [I10]  Yes    Pulmonary emphysema [J43.9]  Yes    Acquired hypothyroidism [E03.9]  Yes      Resolved Hospital Problems   No resolved problems to display.        Brief Hospital Course to date:  Florence Dee is a 64 y.o. female with past medical history significant for COPD, hypothyroidism, hypertension, hyperlipidemia, history of tobacco abuse.  Patient was admitted to the hospital for severe shortness of breath and respiratory failure secondary to parainfluenza infection, strep pneumoniae pneumonia, and COPD exacerbation.  Patient started on IV antibiotics and IV steroids with significant improvement.     Acute hypoxic respiratory failure secondary to parainfluenza infection, multifocal pneumonia, and COPD exacerbation  Ongoing tobacco abuse  - Chest x-ray revealing hyperexpanded lungs  - CTA chest negative for pulmonary embolism, however revealing a multifocal  pneumonia, mild emphysema  - Respiratory panel positive for parainfluenza virus  - Respiratory culture positive for strep pneumoniae  PLAN  - Continue ceftriaxone and doxycycline  - Continue inhalers and nebulizers.  Continue IV steroids for now.  Plan to taper with PO steroids.   - Incentive spirometer and flutter valve  - Wean oxygen for goal saturation of 88-92%     Hyponatremia, asymptomatic  - Holding home hydrochlorothiazide  - Will lower dose of citalopram to 20 mg daily.  Start salt tabs.  - Continue every 4 hour sodium checks.       Elevated LFT's  HLD  - Chronically elevated, monitor  - On tricor / statin, continue for now     HTN / HLD  - Continue ARB, hold HCTZ 2/2 low sodium     Hypothyroidism  - Continue levothyroxine     Alcohol abuse  - Start on alcohol withdrawal protocol, CIWA  - Fall precaution     Mood disorder  - Citalopram per above, monitor sodium levels    Aneurysmal dilation of ascending aorta  - Incidental finding, dilatation of 43 mm.  Will need outpatient surveillance.    Expected Discharge Location and Transportation: Home  Expected Discharge   Expected Discharge Date: 2025; Expected Discharge Time:      VTE Prophylaxis:  Pharmacologic VTE prophylaxis orders are present.         AM-PAC 6 Clicks Score (PT): 22 (25)    CODE STATUS:   Code Status and Medical Interventions: CPR (Attempt to Resuscitate); Full Support   Ordered at: 25 0610     Code Status (Patient has no pulse and is not breathing):    CPR (Attempt to Resuscitate)     Medical Interventions (Patient has pulse or is breathing):    Full Support       Colin Almanzar DO  25        Electronically signed by Colin Almanzar DO at 25 1321       Colin Almanzar DO at 25 1717              [facility] Medicine Services  PROGRESS NOTE    Patient Name: Florence Dee  : 1960  MRN: [id number]    Date of Admission: 2025  Primary Care Physician: Joann Hankins,  DO    Subjective   Subjective     CC:  SOB    HPI:  Patient seen resting comfortably in bed in no acute distress.  Reports improvement in respiratory status today.  Breathing much better on exam today.  Minimal wheezing.      Objective   Objective     Vital Signs:   Temp:  [98 °F (36.7 °C)-98.7 °F (37.1 °C)] 98.3 °F (36.8 °C)  Heart Rate:  [65-97] 97  Resp:  [16-20] 18  BP: (121-144)/(84-94) 125/84  Flow (L/min) (Oxygen Therapy):  [1-2] 1     Physical Exam  Cardiovascular:      Rate and Rhythm: Normal rate.      Pulses: Normal pulses.   Pulmonary:      Effort: Pulmonary effort is normal. No respiratory distress.      Breath sounds: Wheezing present.      Comments: Diminished breath sounds throughout  Abdominal:      General: There is no distension.      Palpations: Abdomen is soft.      Tenderness: There is no abdominal tenderness. There is no guarding or rebound.   Musculoskeletal:      Right lower leg: No edema.      Left lower leg: No edema.   Skin:     General: Skin is warm.   Neurological:      Mental Status: She is alert and oriented to person, place, and time.   Psychiatric:         Mood and Affect: Mood normal.         Behavior: Behavior normal.           Results Reviewed:  LAB RESULTS:      Lab 05/20/25  0420 05/19/25  0506 05/19/25  0320   WBC 10.51  --  7.14   HEMOGLOBIN 13.1  --  14.4   HEMATOCRIT 38.7  --  43.2   PLATELETS 236  --  254   NEUTROS ABS 9.37*  --  6.01   IMMATURE GRANS (ABS) 0.05  --  0.04   LYMPHS ABS 0.50*  --  0.55*   MONOS ABS 0.58  --  0.45   EOS ABS 0.00  --  0.07   MCV 99.0*  --  100.0*   LACTATE  --   --  1.3   D DIMER QUANT  --   --  0.63*   HSTROP T  --  <6 <6         Lab 05/21/25  1352 05/21/25  0759 05/21/25  0402 05/21/25  0013 05/20/25  2201 05/20/25  0807 05/20/25  0420 05/19/25  0809 05/19/25  0320   SODIUM 128* 125* 124* 125*  125* 126*   < > 125*  125*   < > 125*   POTASSIUM  --   --   --  4.4  --   --  4.3  --  4.2   CHLORIDE  --   --   --  91*  --   --  89*  --  88*    CO2  --   --   --  25.0  --   --  26.0  --  26.0   ANION GAP  --   --   --  9.0  --   --  10.0  --  11.0   BUN  --   --   --  11  --   --  10  --  7*   CREATININE  --   --   --  0.54*  --   --  0.52*  --  0.57   EGFR  --   --   --  104.2  --   --  105.2  --  102.9   GLUCOSE  --   --   --  139*  --   --  145*  --  106*   CALCIUM  --   --   --  8.5*  --   --  8.6  --  8.4*   MAGNESIUM  --   --   --   --   --   --  2.0  --   --     < > = values in this interval not displayed.         Lab 05/20/25  0420 05/19/25  0320   TOTAL PROTEIN 6.1 6.8   ALBUMIN 3.8 4.3   GLOBULIN 2.3 2.5   ALT (SGPT) 51* 85*   AST (SGOT) 38* 81*   BILIRUBIN 0.3 0.3   ALK PHOS 65 87         Lab 05/19/25  0506 05/19/25  0320   PROBNP  --  36.6   HSTROP T <6 <6                 Lab 05/19/25  0340   PH, ARTERIAL 7.412   PCO2, ARTERIAL 44.1   PO2 ART 76.4*   FIO2 36   HCO3 ART 28.0*   BASE EXCESS ART 2.9*   CARBOXYHEMOGLOBIN 3.2*     Brief Urine Lab Results       None            Microbiology Results Abnormal       Procedure Component Value - Date/Time    Respiratory Panel PCR w/COVID-19(SARS-CoV-2) [facility] In-House, NP Swab in [facility], 2 HR TAT - Swab, Nasopharynx [[id number]]  (Abnormal) Collected: 05/19/25 0327    Lab Status: Final result Specimen: Swab from Nasopharynx Updated: 05/19/25 0609     ADENOVIRUS, PCR Not Detected     Coronavirus 229E Not Detected     Coronavirus HKU1 Not Detected     Coronavirus NL63 Not Detected     Coronavirus OC43 Not Detected     COVID19 Not Detected     Human Metapneumovirus Not Detected     Human Rhinovirus/Enterovirus Not Detected     Influenza A PCR Not Detected     Influenza B PCR Not Detected     Parainfluenza Virus 1 Not Detected     Parainfluenza Virus 2 Not Detected     Parainfluenza Virus 3 Detected     Parainfluenza Virus 4 Not Detected     RSV, PCR Not Detected     Bordetella pertussis pcr Not Detected     Bordetella parapertussis PCR Not Detected     Chlamydophila pneumoniae PCR Not  Detected     Mycoplasma pneumo by PCR Not Detected    Narrative:      In the setting of a positive respiratory panel with a viral infection PLUS a negative procalcitonin without other underlying concern for bacterial infection, consider observing off antibiotics or discontinuation of antibiotics and continue supportive care. If the respiratory panel is positive for atypical bacterial infection (Bordetella pertussis, Chlamydophila pneumoniae, or Mycoplasma pneumoniae), consider antibiotic de-escalation to target atypical bacterial infection.            CT Angiogram Chest  Result Date: 5/20/2025  CT ANGIOGRAM CHEST Date of Exam: 5/20/2025 4:31 AM EDT Indication: SOB, elevated D-dimer. Comparison: Chest radiograph 5/19/2025 Technique: CTA of the chest was performed before and after the uneventful intravenous administration of 75 mL Isovue-370. Reconstructed coronal and sagittal images were also obtained. In addition, a 3-D volume rendered image was created for interpretation. Automated exposure control and iterative reconstruction methods were used. Findings: Thyroid is atrophic and not well seen. The trachea and esophagus appear within normal limits. Heart size is normal. Minimal coronary artery calcification. No pericardial effusion or mediastinal lymphadenopathy. There is aneurysmal dilatation of the ascending aorta up to 43 mm. There is minimal aortic atherosclerosis. Main pulmonary artery is normal diameter. No evidence of pulmonary embolism. There is patchy airspace disease in the left lung and there is tree-in-bud nodularity in the right lower lobe consistent with multifocal pneumonia. There is associated peribronchial thickening, which is most pronounced in the left dependent lung. There is mild reactive left hilar adenopathy. Airways are patent. No pneumothorax or pleural effusion. There is mild emphysema. No acute findings in the superficial soft tissues or within the limited images of the upper abdomen. No  acute osseous abnormality or significant osseous degenerative change.     Impression: Impression: 1.No evidence of pulmonary embolism. 2.Multifocal pneumonia, most pronounced in the left lung. 3.Mild emphysema. 4.Aneurysmal dilatation of the ascending aorta up to 43 mm. Electronically Signed: Marshall Craig MD  5/20/2025 4:42 AM EDT  Workstation ID: [id number]          Current medications:  Scheduled Meds:arformoterol, 15 mcg, Nebulization, BID - RT   And  budesonide, 0.5 mg, Nebulization, BID - RT  cefTRIAXone, 1,000 mg, Intravenous, Q24H  [START ON 5/22/2025] citalopram, 20 mg, Oral, Daily  doxycycline, 100 mg, Oral, Q12H  enoxaparin sodium, 40 mg, Subcutaneous, Daily  fenofibrate, 48 mg, Oral, Daily  folic acid, 1 mg, Oral, Daily  guaiFENesin, 600 mg, Oral, Q12H  ipratropium-albuterol, 3 mL, Nebulization, Q4H - RT  levothyroxine, 100 mcg, Oral, QAM  losartan, 50 mg, Oral, Q24H  methylPREDNISolone sodium succinate, 80 mg, Intravenous, Q12H  montelukast, 10 mg, Oral, Nightly  multivitamin with minerals, 1 tablet, Oral, Daily  nicotine, 1 patch, Transdermal, Q24H  rosuvastatin, 40 mg, Oral, Daily  sodium chloride, 10 mL, Intravenous, Q12H  sodium chloride, 1 g, Oral, TID With Meals  thiamine (B-1) IV, 200 mg, Intravenous, Q8H   Followed by  [START ON 5/25/2025] thiamine, 100 mg, Oral, Daily      Continuous Infusions:     PRN Meds:.  acetaminophen **OR** acetaminophen **OR** acetaminophen    benzonatate    senna-docusate sodium **AND** polyethylene glycol **AND** bisacodyl **AND** bisacodyl    ipratropium-albuterol    LORazepam **OR** midazolam **OR** LORazepam **OR** midazolam **OR** midazolam **OR** midazolam    Magnesium Standard Dose Replacement - Follow Nurse / BPA Driven Protocol    melatonin    nitroglycerin    ondansetron ODT **OR** ondansetron    sodium chloride    sodium chloride    sodium chloride    Assessment & Plan   Assessment & Plan     Active Hospital Problems    Diagnosis  POA    **COPD exacerbation  [J44.1]  Yes    Hyponatremia [E87.1]  Unknown    Dyslipidemia [E78.5]  Unknown    Parainfluenza virus infection [B34.8]  Unknown    Acute on chronic respiratory failure with hypoxia [J96.21]  Yes    History of cigarette smoking [Z87.891]  Not Applicable    Primary hypertension [I10]  Yes    Pulmonary emphysema [J43.9]  Yes    Acquired hypothyroidism [E03.9]  Yes      Resolved Hospital Problems   No resolved problems to display.        Brief Hospital Course to date:  Florence Dee is a 64 y.o. female with past medical history significant for COPD, hypothyroidism, hypertension, hyperlipidemia, history of tobacco abuse.  Patient was admitted to the hospital for severe shortness of breath and respiratory failure secondary to parainfluenza infection and also COPD exacerbation.     Acute hypoxic respiratory failure secondary to parainfluenza infection, multifocal pneumonia, and COPD exacerbation  Ongoing tobacco abuse  - Chest x-ray revealing hyperexpanded lungs  - CTA chest negative for pulmonary embolism, however revealing a multifocal pneumonia, mild emphysema  - Respiratory panel positive for parainfluenza virus  PLAN  - Start ceftriaxone and doxycycline  - Continue inhalers and nebulizers.  Continue IV steroids for now.  Plan to taper in upcoming days  - Incentive spirometer and flutter valve  - Wean oxygen for goal saturation of 88-92%     Hyponatremia, asymptomatic  - Holding home hydrochlorothiazide  - Will lower dose of citalopram to 20 mg daily.  Start salt tabs.  - Continue every 4 hour sodium checks.       Elevated LFT's  - Chronically elevated, monitor  - On tricor / statin, continue for now     HTN / HLD  - Continue ARB, hold HCTZ 2/2 low sodium  - Continue tricor / statin, monitor LFT's     Hypothyroidism  - Continue levothyroxine     Alcohol abuse  - Start on alcohol withdrawal protocol, CIWA  - Fall precaution     Mood disorder  - Continue citalopram, monitor sodium levels    Aneurysmal dilation of  ascending aorta  - Incidental finding, dilatation of 43 mm.  Will need outpatient surveillance.    Expected Discharge Location and Transportation: Home  Expected Discharge   Expected Discharge Date: 2025; Expected Discharge Time:      VTE Prophylaxis:  Pharmacologic VTE prophylaxis orders are present.         AM-PAC 6 Clicks Score (PT): 22 (25 1542)    CODE STATUS:   Code Status and Medical Interventions: CPR (Attempt to Resuscitate); Full Support   Ordered at: 25 0610     Code Status (Patient has no pulse and is not breathing):    CPR (Attempt to Resuscitate)     Medical Interventions (Patient has pulse or is breathing):    Full Support       Colin Almanzar DO  25        Electronically signed by Colin Almanzar DO at 25 1722       Colin Almanzar DO at 25 1700              [facility] Medicine Services  PROGRESS NOTE    Patient Name: Florence Dee  : 1960  MRN: [id number]    Date of Admission: 2025  Primary Care Physician: Joann Hankins DO    Subjective   Subjective     CC:  SOB    HPI:  Patient seen resting comfortably in bed in no acute distress.  Reports breathing has improved today.  Otherwise no acute complaints.      Objective   Objective     Vital Signs:   Temp:  [97.7 °F (36.5 °C)-101 °F (38.3 °C)] 98.3 °F (36.8 °C)  Heart Rate:  [] 84  Resp:  [16-24] 18  BP: (121-134)/(84-99) 131/86  Flow (L/min) (Oxygen Therapy):  [2-4] 2     Physical Exam  Cardiovascular:      Rate and Rhythm: Normal rate.      Pulses: Normal pulses.   Pulmonary:      Effort: Pulmonary effort is normal.      Comments: Manage breath sounds throughout  Abdominal:      General: There is no distension.      Palpations: Abdomen is soft.      Tenderness: There is no abdominal tenderness. There is no guarding or rebound.   Musculoskeletal:      Right lower leg: No edema.      Left lower leg: No edema.   Skin:     General: Skin is warm.   Neurological:      Mental Status:  She is alert and oriented to person, place, and time.   Psychiatric:         Mood and Affect: Mood normal.         Behavior: Behavior normal.           Results Reviewed:  LAB RESULTS:      Lab 05/20/25 0420 05/19/25 0506 05/19/25  0320   WBC 10.51  --  7.14   HEMOGLOBIN 13.1  --  14.4   HEMATOCRIT 38.7  --  43.2   PLATELETS 236  --  254   NEUTROS ABS 9.37*  --  6.01   IMMATURE GRANS (ABS) 0.05  --  0.04   LYMPHS ABS 0.50*  --  0.55*   MONOS ABS 0.58  --  0.45   EOS ABS 0.00  --  0.07   MCV 99.0*  --  100.0*   LACTATE  --   --  1.3   D DIMER QUANT  --   --  0.63*   HSTROP T  --  <6 <6         Lab 05/20/25  1615 05/20/25  1223 05/20/25  0807 05/20/25  0420 05/20/25  0052 05/19/25  0809 05/19/25  0320   SODIUM 126* 126* 127* 125*  125* 124*   < > 125*   POTASSIUM  --   --   --  4.3  --   --  4.2   CHLORIDE  --   --   --  89*  --   --  88*   CO2  --   --   --  26.0  --   --  26.0   ANION GAP  --   --   --  10.0  --   --  11.0   BUN  --   --   --  10  --   --  7*   CREATININE  --   --   --  0.52*  --   --  0.57   EGFR  --   --   --  105.2  --   --  102.9   GLUCOSE  --   --   --  145*  --   --  106*   CALCIUM  --   --   --  8.6  --   --  8.4*   MAGNESIUM  --   --   --  2.0  --   --   --     < > = values in this interval not displayed.         Lab 05/20/25 0420 05/19/25  0320   TOTAL PROTEIN 6.1 6.8   ALBUMIN 3.8 4.3   GLOBULIN 2.3 2.5   ALT (SGPT) 51* 85*   AST (SGOT) 38* 81*   BILIRUBIN 0.3 0.3   ALK PHOS 65 87         Lab 05/19/25  0506 05/19/25  0320   PROBNP  --  36.6   HSTROP T <6 <6                 Lab 05/19/25  0340   PH, ARTERIAL 7.412   PCO2, ARTERIAL 44.1   PO2 ART 76.4*   FIO2 36   HCO3 ART 28.0*   BASE EXCESS ART 2.9*   CARBOXYHEMOGLOBIN 3.2*     Brief Urine Lab Results       None            Microbiology Results Abnormal       Procedure Component Value - Date/Time    Respiratory Panel PCR w/COVID-19(SARS-CoV-2) [facility] In-House, NP Swab in UTM/VTM, 2 HR TAT - Swab, Nasopharynx [[id number]]   (Abnormal) Collected: 05/19/25 0327    Lab Status: Final result Specimen: Swab from Nasopharynx Updated: 05/19/25 0609     ADENOVIRUS, PCR Not Detected     Coronavirus 229E Not Detected     Coronavirus HKU1 Not Detected     Coronavirus NL63 Not Detected     Coronavirus OC43 Not Detected     COVID19 Not Detected     Human Metapneumovirus Not Detected     Human Rhinovirus/Enterovirus Not Detected     Influenza A PCR Not Detected     Influenza B PCR Not Detected     Parainfluenza Virus 1 Not Detected     Parainfluenza Virus 2 Not Detected     Parainfluenza Virus 3 Detected     Parainfluenza Virus 4 Not Detected     RSV, PCR Not Detected     Bordetella pertussis pcr Not Detected     Bordetella parapertussis PCR Not Detected     Chlamydophila pneumoniae PCR Not Detected     Mycoplasma pneumo by PCR Not Detected    Narrative:      In the setting of a positive respiratory panel with a viral infection PLUS a negative procalcitonin without other underlying concern for bacterial infection, consider observing off antibiotics or discontinuation of antibiotics and continue supportive care. If the respiratory panel is positive for atypical bacterial infection (Bordetella pertussis, Chlamydophila pneumoniae, or Mycoplasma pneumoniae), consider antibiotic de-escalation to target atypical bacterial infection.            CT Angiogram Chest  Result Date: 5/20/2025  CT ANGIOGRAM CHEST Date of Exam: 5/20/2025 4:31 AM EDT Indication: SOB, elevated D-dimer. Comparison: Chest radiograph 5/19/2025 Technique: CTA of the chest was performed before and after the uneventful intravenous administration of 75 mL Isovue-370. Reconstructed coronal and sagittal images were also obtained. In addition, a 3-D volume rendered image was created for interpretation. Automated exposure control and iterative reconstruction methods were used. Findings: Thyroid is atrophic and not well seen. The trachea and esophagus appear within normal limits. Heart size is  normal. Minimal coronary artery calcification. No pericardial effusion or mediastinal lymphadenopathy. There is aneurysmal dilatation of the ascending aorta up to 43 mm. There is minimal aortic atherosclerosis. Main pulmonary artery is normal diameter. No evidence of pulmonary embolism. There is patchy airspace disease in the left lung and there is tree-in-bud nodularity in the right lower lobe consistent with multifocal pneumonia. There is associated peribronchial thickening, which is most pronounced in the left dependent lung. There is mild reactive left hilar adenopathy. Airways are patent. No pneumothorax or pleural effusion. There is mild emphysema. No acute findings in the superficial soft tissues or within the limited images of the upper abdomen. No acute osseous abnormality or significant osseous degenerative change.     Impression: Impression: 1.No evidence of pulmonary embolism. 2.Multifocal pneumonia, most pronounced in the left lung. 3.Mild emphysema. 4.Aneurysmal dilatation of the ascending aorta up to 43 mm. Electronically Signed: Marshall Craig MD  5/20/2025 4:42 AM EDT  Workstation ID: [id number]    XR Chest 1 View  Result Date: 5/19/2025  XR CHEST 1 VW Date of Exam: 5/19/2025 3:11 AM EDT Indication: SOA triage protocol Comparison: None available. Findings: The lungs are hyperexpanded. There is interstitial prominence. There is no pneumothorax, pleural effusion or focal airspace consolidation. Heart size and pulmonary vasculature appear within normal limits. Regional bones appear intact.     Impression: Impression: Hyperexpanded lungs with interstitial prominence. Correlate for COPD. Electronically Signed: Marshall Craig MD  5/19/2025 4:18 AM EDT  Workstation ID: [id number]          Current medications:  Scheduled Meds:arformoterol, 15 mcg, Nebulization, BID - RT   And  budesonide, 0.5 mg, Nebulization, BID - RT  cefTRIAXone, 1,000 mg, Intravenous, Q24H  citalopram, 40 mg, Oral, Daily  doxycycline, 100  mg, Intravenous, BID  enoxaparin sodium, 40 mg, Subcutaneous, Daily  fenofibrate, 48 mg, Oral, Daily  folic acid, 1 mg, Oral, Daily  guaiFENesin, 600 mg, Oral, Q12H  ipratropium-albuterol, 3 mL, Nebulization, Q4H - RT  levothyroxine, 100 mcg, Oral, QAM  losartan, 50 mg, Oral, Q24H  methylPREDNISolone sodium succinate, 80 mg, Intravenous, Q12H  montelukast, 10 mg, Oral, Nightly  multivitamin with minerals, 1 tablet, Oral, Daily  nicotine, 1 patch, Transdermal, Q24H  rosuvastatin, 40 mg, Oral, Daily  sodium chloride, 10 mL, Intravenous, Q12H  thiamine (B-1) IV, 200 mg, Intravenous, Q8H   Followed by  [START ON 5/25/2025] thiamine, 100 mg, Oral, Daily      Continuous Infusions:sodium chloride, 100 mL/hr, Last Rate: 100 mL/hr (05/20/25 1628)      PRN Meds:.  acetaminophen **OR** acetaminophen **OR** acetaminophen    benzonatate    senna-docusate sodium **AND** polyethylene glycol **AND** bisacodyl **AND** bisacodyl    ipratropium-albuterol    LORazepam **OR** midazolam **OR** LORazepam **OR** midazolam **OR** midazolam **OR** midazolam    Magnesium Standard Dose Replacement - Follow Nurse / BPA Driven Protocol    melatonin    nitroglycerin    ondansetron ODT **OR** ondansetron    sodium chloride    sodium chloride    sodium chloride    Assessment & Plan   Assessment & Plan     Active Hospital Problems    Diagnosis  POA    **COPD exacerbation [J44.1]  Yes    Hyponatremia [E87.1]  Unknown    Dyslipidemia [E78.5]  Unknown    Parainfluenza virus infection [B34.8]  Unknown    Acute on chronic respiratory failure with hypoxia [J96.21]  Yes    History of cigarette smoking [Z87.891]  Not Applicable    Primary hypertension [I10]  Yes    Pulmonary emphysema [J43.9]  Yes    Acquired hypothyroidism [E03.9]  Yes      Resolved Hospital Problems   No resolved problems to display.        Brief Hospital Course to date:  Florence Dee is a 64 y.o. female with past medical history significant for COPD, hypothyroidism, hypertension,  hyperlipidemia, history of tobacco abuse.  Patient was admitted to the hospital for severe shortness of breath and respiratory failure secondary to parainfluenza infection and also COPD exacerbation.     COPD exacerbation 2/2 parainfluenza viral infection  Ongoing tobacco abuse  Acute respiratory failure secondary to above  - Chest x-ray revealing hyperexpanded lungs  - CTA chest negative for pulmonary embolism, however revealing a multifocal pneumonia, mild emphysema  - Respiratory panel positive for parainfluenza virus  PLAN  - Start ceftriaxone and doxycycline  - Continue inhalers and nebulizers.  Continue IV steroids for now.  - Incentive spirometer and flutter valve  - Wean oxygen for goal saturation of 88-92%     Hyponatremia, symptomatic  - Holding home hydrochlorothiazide  - Continue home citalopram for now, may need to discontinue if patient continues to be hyponatremic  - Continue every 4 hour sodium checks.  Start IV fluids today in setting of low urine sodium, low serum osmolality     Elevated LFT's  - Chronically elevated, monitor  - On tricor / statin, continue for now     HTN / HLD  - Continue ARB, hold HCTZ 2/2 low sodium  - Continue tricor / statin, monitor LFT's     Hypothyroidism  - Continue levothyroxine     Alcohol abuse  - Start on alcohol withdrawal protocol, CIWA  - Fall precaution     Mood disorder  - Continue citalopram, monitor sodium levels    Aneurysmal dilation of ascending aorta  - Incidental finding, dilatation of 43 mm.  Will need outpatient surveillance.    Expected Discharge Location and Transportation: D  Expected Discharge   Expected Discharge Date: 5/21/2025; Expected Discharge Time:      VTE Prophylaxis:  Pharmacologic VTE prophylaxis orders are present.         AM-PAC 6 Clicks Score (PT): 24 (05/19/25 2025)    CODE STATUS:   Code Status and Medical Interventions: CPR (Attempt to Resuscitate); Full Support   Ordered at: 05/19/25 0610     Code Status (Patient has no pulse and  is not breathing):    CPR (Attempt to Resuscitate)     Medical Interventions (Patient has pulse or is breathing):    Full Support       Colin Almanzar DO  25        Electronically signed by Colin Almanzar DO at 25 [id number]       Lenny Corrales MD at 25 1602              [facility] Medicine Services  PROGRESS NOTE    Patient Name: Florence Dee  : 1960  MRN: [id number]    Date of Admission: 2025  Primary Care Physician: Joann Hankins DO    Subjective   Subjective     CC:  SOB    HPI:  Resting in bed no acute distress but still has shortness of breath on she feels tight in the chest.  No fever or chills.  No chest pain or palpitation or shortness of breath.  No nausea or vomiting.      Objective   Objective     Vital Signs:   Temp:  [98 °F (36.7 °C)-100.4 °F (38 °C)] 98.6 °F (37 °C)  Heart Rate:  [] 81  Resp:  [18-22] 20  BP: (116-149)/(71-93) 129/85  Flow (L/min) (Oxygen Therapy):  [2-15] 4     Physical Exam:  Constitutional: No acute distress, awake, alert  HENT: NCAT, mucous membranes moist  Respiratory: Harsh breath sounds, cough on deep inspiration, respiratory effort normal, currently on 4 L of nasal cannula and saturating above 96%.  Cardiovascular: RRR, no murmurs, rubs, or gallops  Gastrointestinal: Positive bowel sounds, soft, nontender, nondistended  Musculoskeletal: No bilateral ankle edema  Psychiatric: Appropriate affect, cooperative  Neurologic: Oriented x 3, speech clear  Skin: No rashes     Results Reviewed:  LAB RESULTS:      Lab 25  0506 25  0320   WBC  --  7.14   HEMOGLOBIN  --  14.4   HEMATOCRIT  --  43.2   PLATELETS  --  254   NEUTROS ABS  --  6.01   IMMATURE GRANS (ABS)  --  0.04   LYMPHS ABS  --  0.55*   MONOS ABS  --  0.45   EOS ABS  --  0.07   MCV  --  100.0*   LACTATE  --  1.3   D DIMER QUANT  --  0.63*   HSTROP T <6 <6         Lab 25  1211 25  0809 25  0320   SODIUM 125* 124* 125*   POTASSIUM  --   --   4.2   CHLORIDE  --   --  88*   CO2  --   --  26.0   ANION GAP  --   --  11.0   BUN  --   --  7*   CREATININE  --   --  0.57   EGFR  --   --  102.9   GLUCOSE  --   --  106*   CALCIUM  --   --  8.4*         Lab 05/19/25  0320   TOTAL PROTEIN 6.8   ALBUMIN 4.3   GLOBULIN 2.5   ALT (SGPT) 85*   AST (SGOT) 81*   BILIRUBIN 0.3   ALK PHOS 87         Lab 05/19/25  0506 05/19/25  0320   PROBNP  --  36.6   HSTROP T <6 <6                 Lab 05/19/25  0340   PH, ARTERIAL 7.412   PCO2, ARTERIAL 44.1   PO2 ART 76.4*   FIO2 36   HCO3 ART 28.0*   BASE EXCESS ART 2.9*   CARBOXYHEMOGLOBIN 3.2*     Brief Urine Lab Results       None            Microbiology Results Abnormal       Procedure Component Value - Date/Time    Respiratory Panel PCR w/COVID-19(SARS-CoV-2) [facility] In-House, NP Swab in UTM/VTM, 2 HR TAT - Swab, Nasopharynx [[id number]]  (Abnormal) Collected: 05/19/25 0327    Lab Status: Final result Specimen: Swab from Nasopharynx Updated: 05/19/25 0609     ADENOVIRUS, PCR Not Detected     Coronavirus 229E Not Detected     Coronavirus HKU1 Not Detected     Coronavirus NL63 Not Detected     Coronavirus OC43 Not Detected     COVID19 Not Detected     Human Metapneumovirus Not Detected     Human Rhinovirus/Enterovirus Not Detected     Influenza A PCR Not Detected     Influenza B PCR Not Detected     Parainfluenza Virus 1 Not Detected     Parainfluenza Virus 2 Not Detected     Parainfluenza Virus 3 Detected     Parainfluenza Virus 4 Not Detected     RSV, PCR Not Detected     Bordetella pertussis pcr Not Detected     Bordetella parapertussis PCR Not Detected     Chlamydophila pneumoniae PCR Not Detected     Mycoplasma pneumo by PCR Not Detected    Narrative:      In the setting of a positive respiratory panel with a viral infection PLUS a negative procalcitonin without other underlying concern for bacterial infection, consider observing off antibiotics or discontinuation of antibiotics and continue supportive care. If  the respiratory panel is positive for atypical bacterial infection (Bordetella pertussis, Chlamydophila pneumoniae, or Mycoplasma pneumoniae), consider antibiotic de-escalation to target atypical bacterial infection.            XR Chest 1 View  Result Date: 5/19/2025  XR CHEST 1 VW Date of Exam: 5/19/2025 3:11 AM EDT Indication: SOA triage protocol Comparison: None available. Findings: The lungs are hyperexpanded. There is interstitial prominence. There is no pneumothorax, pleural effusion or focal airspace consolidation. Heart size and pulmonary vasculature appear within normal limits. Regional bones appear intact.     Impression: Impression: Hyperexpanded lungs with interstitial prominence. Correlate for COPD. Electronically Signed: Marshall Craig MD  5/19/2025 4:18 AM EDT  Workstation ID: [id number]          Current medications:  Scheduled Meds:arformoterol, 15 mcg, Nebulization, BID - RT   And  budesonide, 0.5 mg, Nebulization, BID - RT   And  revefenacin, 175 mcg, Nebulization, Daily - RT  citalopram, 40 mg, Oral, Daily  doxycycline, 100 mg, Intravenous, BID  enoxaparin sodium, 40 mg, Subcutaneous, Daily  fenofibrate, 48 mg, Oral, Daily  guaiFENesin, 600 mg, Oral, Q12H  levothyroxine, 100 mcg, Oral, QAM  losartan, 50 mg, Oral, Q24H  methylPREDNISolone sodium succinate, 80 mg, Intravenous, Q12H  montelukast, 10 mg, Oral, Nightly  rosuvastatin, 40 mg, Oral, Daily  sodium chloride, 10 mL, Intravenous, Q12H      Continuous Infusions:sodium chloride, 75 mL/hr, Last Rate: 75 mL/hr (05/19/25 1418)      PRN Meds:.  acetaminophen **OR** acetaminophen **OR** acetaminophen    benzonatate    senna-docusate sodium **AND** polyethylene glycol **AND** bisacodyl **AND** bisacodyl    ipratropium-albuterol    melatonin    nitroglycerin    ondansetron ODT **OR** ondansetron    sodium chloride    sodium chloride    sodium chloride    Assessment & Plan   Assessment & Plan     Active Hospital Problems    Diagnosis  POA    **COPD  exacerbation [J44.1]  Yes    Hyponatremia [E87.1]  Unknown    Dyslipidemia [E78.5]  Unknown    Parainfluenza virus infection [B34.8]  Unknown    Acute on chronic respiratory failure with hypoxia [J96.21]  Yes    History of cigarette smoking [Z87.891]  Not Applicable    Primary hypertension [I10]  Yes    Pulmonary emphysema [J43.9]  Yes    Acquired hypothyroidism [E03.9]  Yes      Resolved Hospital Problems   No resolved problems to display.        Brief Hospital Course to date:  Florence Dee is a 64 y.o. female with past medical history significant for COPD, hypothyroidism, hypertension, hyperlipidemia, history of tobacco abuse.  Patient was admitted to the hospital for severe shortness of breath and respiratory failure secondary to parainfluenza infection and also COPD exacerbation.    COPD exacerbation 2/2 parainfluenza viral infection  Ongoing tobacco abuse  Acute respiratory failure secondary to above  -Will get CTA of chest to rule out pulmonary embolism  -Continue neb treatment and also steroids     Hyponatremia  - hold hctz  - also on citalopram, continuing for now  - Will discontinue IV fluid and put the patient on fluid restriction.  Will check sodium levels every 4 hours    Elevated LFT's  - chronically elevated, monitor  - on tricor / statin, continue for now     HTN / HLD  - continue ARB, hold HCTZ r/t low sodium  - continue tricor / statin, monitor LFT's     Hypothyroidism  - continue levothyroxine    Alcohol abuse  -Start on alcohol withdrawal protocol  -Fall precaution     Mood disorder  - continue citalopram, monitor sodium levels      Expected Discharge Location and Transportation: Home  Expected Discharge in 2 to 3 days  Expected Discharge Date: 5/21/2025; Expected Discharge Time:      VTE Prophylaxis:  Pharmacologic VTE prophylaxis orders are present.         AM-PAC 6 Clicks Score (PT): 24 (05/19/25 1003)    CODE STATUS:   Code Status and Medical Interventions: CPR (Attempt to Resuscitate);  Full Support   Ordered at: 05/19/25 0610     Code Status (Patient has no pulse and is not breathing):    CPR (Attempt to Resuscitate)     Medical Interventions (Patient has pulse or is breathing):    Full Support       Lenny Corrales MD  05/19/25        Electronically signed by Lenny Corrales MD at 05/19/25 8504       Consult Notes (all)    No notes of this type exist for this encounter.

## 2025-05-23 NOTE — PLAN OF CARE
Goal Outcome Evaluation:  Plan of Care Reviewed With: patient        Progress: improving  Outcome Evaluation: Patient alert and oriented. On 2 LPM nasal cannula. Normal sinus on monitor. Up ad alexandra. PRN meds given for left flank pain. No acute events this shift. Safety precautions in place.

## 2025-05-23 NOTE — PROGRESS NOTES
[facility] Medicine Services  PROGRESS NOTE    Patient Name: Florence Dee  : 1960  MRN: [id number]    Date of Admission: 2025  Primary Care Physician: Joann Hankins DO    Subjective   Subjective     CC:  SOB    HPI:  Patient seen resting comfortably in bedside chair no acute distress.  Reports feeling fairly well today.  Minimal wheezing on exam today.  No complaints otherwise.    Objective   Objective     Vital Signs:   Temp:  [97.7 °F (36.5 °C)-98.2 °F (36.8 °C)] 98 °F (36.7 °C)  Heart Rate:  [67-94] 85  Resp:  [16-20] 16  BP: (139-149)/() 144/94  Flow (L/min) (Oxygen Therapy):  [0.5-2] 1     Physical Exam  Cardiovascular:      Rate and Rhythm: Normal rate.      Pulses: Normal pulses.   Pulmonary:      Effort: Pulmonary effort is normal. No respiratory distress.      Comments: Diminished breath sounds throughout  Abdominal:      General: There is no distension.      Palpations: Abdomen is soft.      Tenderness: There is no abdominal tenderness. There is no guarding or rebound.   Musculoskeletal:      Right lower leg: No edema.      Left lower leg: No edema.   Skin:     General: Skin is warm.   Neurological:      Mental Status: She is alert and oriented to person, place, and time.   Psychiatric:         Mood and Affect: Mood normal.         Behavior: Behavior normal.           Results Reviewed:  LAB RESULTS:      Lab 25  0420 25  0506 25  0320   WBC 10.51  --  7.14   HEMOGLOBIN 13.1  --  14.4   HEMATOCRIT 38.7  --  43.2   PLATELETS 236  --  254   NEUTROS ABS 9.37*  --  6.01   IMMATURE GRANS (ABS) 0.05  --  0.04   LYMPHS ABS 0.50*  --  0.55*   MONOS ABS 0.58  --  0.45   EOS ABS 0.00  --  0.07   MCV 99.0*  --  100.0*   LACTATE  --   --  1.3   D DIMER QUANT  --   --  0.63*   HSTROP T  --  <6 <6         Lab 25  0410 25  0026 25  1623 25  1343 25  0353 25  0038 25  0402 25  0013  05/20/25  0807 05/20/25 0420 05/19/25  0809 05/19/25  0320   SODIUM 129* 128* 128* 128* 129*   < > 127*   < > 125*  125*   < > 125*  125*   < > 125*   POTASSIUM  --   --   --   --   --   --  4.4  --  4.4  --  4.3  --  4.2   CHLORIDE  --   --   --   --   --   --  93*  --  91*  --  89*  --  88*   CO2  --   --   --   --   --   --  25.0  --  25.0  --  26.0  --  26.0   ANION GAP  --   --   --   --   --   --  9.0  --  9.0  --  10.0  --  11.0   BUN  --   --   --   --   --   --  12  --  11  --  10  --  7*   CREATININE  --   --   --   --   --   --  0.47*  --  0.54*  --  0.52*  --  0.57   EGFR  --   --   --   --   --   --  107.8  --  104.2  --  105.2  --  102.9   GLUCOSE  --   --   --   --   --   --  159*  --  139*  --  145*  --  106*   CALCIUM  --   --   --   --   --   --  8.4*  --  8.5*  --  8.6  --  8.4*   MAGNESIUM  --   --   --   --   --   --  1.9  --   --   --  2.0  --   --     < > = values in this interval not displayed.         Lab 05/20/25  0420 05/19/25 0320   TOTAL PROTEIN 6.1 6.8   ALBUMIN 3.8 4.3   GLOBULIN 2.3 2.5   ALT (SGPT) 51* 85*   AST (SGOT) 38* 81*   BILIRUBIN 0.3 0.3   ALK PHOS 65 87         Lab 05/19/25  0506 05/19/25  0320   PROBNP  --  36.6   HSTROP T <6 <6                 Lab 05/19/25  0340   PH, ARTERIAL 7.412   PCO2, ARTERIAL 44.1   PO2 ART 76.4*   FIO2 36   HCO3 ART 28.0*   BASE EXCESS ART 2.9*   CARBOXYHEMOGLOBIN 3.2*     Brief Urine Lab Results       None            Microbiology Results Abnormal       Procedure Component Value - Date/Time    Respiratory Culture - Sputum, Cough [[id number]]  (Abnormal)  (Susceptibility) Collected: 05/19/25 1514    Lab Status: Final result Specimen: Sputum from Cough Updated: 05/22/25 0840     Respiratory Culture Heavy growth (4+) Streptococcus pneumoniae      Light growth (2+) Normal Respiratory Mandy     Gram Stain Many (4+) WBCs per low power field      Rare (1+) Epithelial cells per low power field      Many (4+) Gram positive cocci in pairs and chains     Narrative:            Susceptibility        Streptococcus pneumoniae      CEE      Ceftriaxone (Meningitis) Susceptible      Ceftriaxone (Non-meningitis) Susceptible      Levofloxacin Susceptible      Penicillin (Meningitis) Susceptible      Penicillin (Non-Meningitis) Susceptible                           Respiratory Panel PCR w/COVID-19(SARS-CoV-2) [facility] In-House, NP Swab in UTM/VTM, 2 HR TAT - Swab, Nasopharynx [[id number]]  (Abnormal) Collected: 05/19/25 0327    Lab Status: Final result Specimen: Swab from Nasopharynx Updated: 05/19/25 0609     ADENOVIRUS, PCR Not Detected     Coronavirus 229E Not Detected     Coronavirus HKU1 Not Detected     Coronavirus NL63 Not Detected     Coronavirus OC43 Not Detected     COVID19 Not Detected     Human Metapneumovirus Not Detected     Human Rhinovirus/Enterovirus Not Detected     Influenza A PCR Not Detected     Influenza B PCR Not Detected     Parainfluenza Virus 1 Not Detected     Parainfluenza Virus 2 Not Detected     Parainfluenza Virus 3 Detected     Parainfluenza Virus 4 Not Detected     RSV, PCR Not Detected     Bordetella pertussis pcr Not Detected     Bordetella parapertussis PCR Not Detected     Chlamydophila pneumoniae PCR Not Detected     Mycoplasma pneumo by PCR Not Detected    Narrative:      In the setting of a positive respiratory panel with a viral infection PLUS a negative procalcitonin without other underlying concern for bacterial infection, consider observing off antibiotics or discontinuation of antibiotics and continue supportive care. If the respiratory panel is positive for atypical bacterial infection (Bordetella pertussis, Chlamydophila pneumoniae, or Mycoplasma pneumoniae), consider antibiotic de-escalation to target atypical bacterial infection.            No radiology results from the last 24 hrs          Current medications:  Scheduled Meds:arformoterol, 15 mcg, Nebulization, BID - RT   And  budesonide, 0.5 mg, Nebulization,  BID - RT  cefTRIAXone, 1,000 mg, Intravenous, Q24H  citalopram, 20 mg, Oral, Daily  doxycycline, 100 mg, Oral, Q12H  enoxaparin sodium, 40 mg, Subcutaneous, Daily  fenofibrate, 48 mg, Oral, Daily  folic acid, 1 mg, Oral, Daily  guaiFENesin, 600 mg, Oral, Q12H  ipratropium-albuterol, 3 mL, Nebulization, Q6H - RT  levothyroxine, 100 mcg, Oral, QAM  losartan, 50 mg, Oral, Q24H  montelukast, 10 mg, Oral, Nightly  multivitamin with minerals, 1 tablet, Oral, Daily  nicotine, 1 patch, Transdermal, Q24H  [START ON 5/24/2025] predniSONE, 40 mg, Oral, Daily With Breakfast   Followed by  [START ON 5/27/2025] predniSONE, 30 mg, Oral, Daily With Breakfast   Followed by  [START ON 5/30/2025] predniSONE, 20 mg, Oral, Daily With Breakfast   Followed by  [START ON 6/2/2025] predniSONE, 10 mg, Oral, Daily With Breakfast   Followed by  [START ON 6/5/2025] predniSONE, 5 mg, Oral, Daily With Breakfast  rosuvastatin, 40 mg, Oral, Daily  sodium chloride, 10 mL, Intravenous, Q12H  sodium chloride, 1 g, Oral, TID With Meals  thiamine (B-1) IV, 200 mg, Intravenous, Q8H   Followed by  [START ON 5/25/2025] thiamine, 100 mg, Oral, Daily      Continuous Infusions:     PRN Meds:.  acetaminophen **OR** acetaminophen **OR** acetaminophen    benzonatate    senna-docusate sodium **AND** polyethylene glycol **AND** bisacodyl **AND** bisacodyl    ipratropium-albuterol    LORazepam **OR** midazolam **OR** LORazepam **OR** midazolam **OR** midazolam **OR** midazolam    Magnesium Standard Dose Replacement - Follow Nurse / BPA Driven Protocol    melatonin    nitroglycerin    ondansetron ODT **OR** ondansetron    sodium chloride    sodium chloride    sodium chloride    Assessment & Plan   Assessment & Plan     Active Hospital Problems    Diagnosis  POA    **COPD exacerbation [J44.1]  Yes    Hyponatremia [E87.1]  Unknown    Dyslipidemia [E78.5]  Unknown    Parainfluenza virus infection [B34.8]  Unknown    Acute on chronic respiratory failure with hypoxia  [J96.21]  Yes    History of cigarette smoking [Z87.891]  Not Applicable    Primary hypertension [I10]  Yes    Pulmonary emphysema [J43.9]  Yes    Acquired hypothyroidism [E03.9]  Yes      Resolved Hospital Problems   No resolved problems to display.        Brief Hospital Course to date:  Florence Dee is a 64 y.o. female with past medical history significant for COPD, hypothyroidism, hypertension, hyperlipidemia, history of tobacco abuse.  Patient was admitted to the hospital for severe shortness of breath and respiratory failure secondary to parainfluenza infection, strep pneumoniae pneumonia, and COPD exacerbation.  Patient started on IV antibiotics and IV steroids with significant improvement.     Acute hypoxic respiratory failure secondary to parainfluenza infection, multifocal pneumonia, and COPD exacerbation  Ongoing tobacco abuse  - Chest x-ray revealing hyperexpanded lungs  - CTA chest negative for pulmonary embolism, however revealing a multifocal pneumonia, mild emphysema  - Respiratory panel positive for parainfluenza virus  - Respiratory culture positive for strep pneumoniae  PLAN  - Continue ceftriaxone and doxycycline  - Continue inhalers and nebulizers.  Completing IV steroids and starting p.o. steroid taper.  - Incentive spirometer and flutter valve  - Wean oxygen for goal saturation of 88-92%  - Will need outpatient pulmonology referral      Hyponatremia, asymptomatic  - Holding home hydrochlorothiazide  - Will lower dose of citalopram to 20 mg daily.  Start salt tabs.  - Check TSH/Free T4     Elevated LFT's  HLD  - Chronically elevated, monitor  - On tricor / statin, continue for now     HTN / HLD  - Continue ARB, hold HCTZ 2/2 low sodium     Hypothyroidism  - Continue levothyroxine     Alcohol abuse  - Start on alcohol withdrawal protocol, CIWA  - Fall precaution     Mood disorder  - Citalopram per above, monitor sodium levels    Aneurysmal dilation of ascending aorta  - Incidental finding,  dilatation of 43 mm.  Will need outpatient surveillance.    Expected Discharge Location and Transportation: Home  Expected Discharge   Expected Discharge Date: 5/21/2025; Expected Discharge Time:      VTE Prophylaxis:  Pharmacologic VTE prophylaxis orders are present.         AM-PAC 6 Clicks Score (PT): 24 (05/22/25 2020)    CODE STATUS:   Code Status and Medical Interventions: CPR (Attempt to Resuscitate); Full Support   Ordered at: 05/19/25 0610     Code Status (Patient has no pulse and is not breathing):    CPR (Attempt to Resuscitate)     Medical Interventions (Patient has pulse or is breathing):    Full Support       Colin Almanzar DO  05/23/25

## 2025-05-23 NOTE — CASE MANAGEMENT/SOCIAL WORK
Continued Stay Note  [facility]     Patient Name: Florence Dee  MRN: [id number]  Today's Date: 5/23/2025    Admit Date: 5/19/2025    Plan: Home   Discharge Plan       Row Name 05/23/25 1229       Plan    Plan Home    Patient/Family in Agreement with Plan yes    Plan Comments I spoke with Ms Dee at bedside.  At this time Ms Dee was on room air, satting between 92-95% while talking to me.  At this time she denies any discharge needs.  PT did work with her and recommended a rollator, and one has been delivered to bedside by Mark.  Ms Dee states that her  can transport her home at discharge.    Final Discharge Disposition Code 01 - home or self-care                   Discharge Codes    No documentation.                 Expected Discharge Date and Time       Expected Discharge Date Expected Discharge Time    May 21, 2025               Samantha Steiner, RN

## 2025-05-24 ENCOUNTER — READMISSION MANAGEMENT (OUTPATIENT)
Dept: CALL CENTER | Facility: HOSPITAL | Age: 65
End: 2025-05-24
Payer: COMMERCIAL

## 2025-05-24 VITALS
HEART RATE: 80 BPM | OXYGEN SATURATION: 85 % | DIASTOLIC BLOOD PRESSURE: 70 MMHG | TEMPERATURE: 96.7 F | RESPIRATION RATE: 16 BRPM | HEIGHT: 64 IN | SYSTOLIC BLOOD PRESSURE: 132 MMHG | BODY MASS INDEX: 21.85 KG/M2 | WEIGHT: 128 LBS

## 2025-05-24 PROBLEM — B34.8 PARAINFLUENZA VIRUS INFECTION: Status: RESOLVED | Noted: 2025-05-19 | Resolved: 2025-05-24

## 2025-05-24 PROBLEM — J96.21 ACUTE ON CHRONIC RESPIRATORY FAILURE WITH HYPOXIA: Status: RESOLVED | Noted: 2025-05-19 | Resolved: 2025-05-24

## 2025-05-24 PROBLEM — J44.1 COPD EXACERBATION: Status: RESOLVED | Noted: 2025-05-19 | Resolved: 2025-05-24

## 2025-05-24 LAB — BACTERIA SPEC AEROBE CULT: NORMAL

## 2025-05-24 PROCEDURE — 94664 DEMO&/EVAL PT USE INHALER: CPT

## 2025-05-24 PROCEDURE — 99239 HOSP IP/OBS DSCHRG MGMT >30: CPT | Performed by: STUDENT IN AN ORGANIZED HEALTH CARE EDUCATION/TRAINING PROGRAM

## 2025-05-24 PROCEDURE — 94799 UNLISTED PULMONARY SVC/PX: CPT

## 2025-05-24 PROCEDURE — 25010000002 CEFTRIAXONE PER 250 MG: Performed by: STUDENT IN AN ORGANIZED HEALTH CARE EDUCATION/TRAINING PROGRAM

## 2025-05-24 PROCEDURE — 63710000001 PREDNISONE PER 1 MG: Performed by: STUDENT IN AN ORGANIZED HEALTH CARE EDUCATION/TRAINING PROGRAM

## 2025-05-24 PROCEDURE — 25010000002 ENOXAPARIN PER 10 MG: Performed by: NURSE PRACTITIONER

## 2025-05-24 RX ORDER — SODIUM CHLORIDE 1 G/1
1 TABLET ORAL 2 TIMES DAILY WITH MEALS
Qty: 60 TABLET | Refills: 0 | Status: SHIPPED | OUTPATIENT
Start: 2025-05-24 | End: 2025-06-23

## 2025-05-24 RX ORDER — SODIUM CHLORIDE 1 G/1
1 TABLET ORAL 2 TIMES DAILY WITH MEALS
Status: DISCONTINUED | OUTPATIENT
Start: 2025-05-24 | End: 2025-05-24 | Stop reason: HOSPADM

## 2025-05-24 RX ORDER — GUAIFENESIN 600 MG/1
600 TABLET, EXTENDED RELEASE ORAL EVERY 12 HOURS SCHEDULED
Qty: 10 TABLET | Refills: 0 | Status: SHIPPED | OUTPATIENT
Start: 2025-05-24 | End: 2025-05-29

## 2025-05-24 RX ORDER — LEVOTHYROXINE SODIUM 112 UG/1
112 TABLET ORAL EVERY MORNING
Status: DISCONTINUED | OUTPATIENT
Start: 2025-05-25 | End: 2025-05-24 | Stop reason: HOSPADM

## 2025-05-24 RX ORDER — FOLIC ACID 1 MG/1
1 TABLET ORAL DAILY
Qty: 30 TABLET | Refills: 0 | Status: SHIPPED | OUTPATIENT
Start: 2025-05-25 | End: 2025-06-24

## 2025-05-24 RX ORDER — PREDNISONE 5 MG/1
TABLET ORAL
Qty: 55 TABLET | Refills: 0 | Status: SHIPPED | OUTPATIENT
Start: 2025-05-25 | End: 2025-06-08

## 2025-05-24 RX ORDER — LANOLIN ALCOHOL/MO/W.PET/CERES
100 CREAM (GRAM) TOPICAL DAILY
Qty: 30 TABLET | Refills: 0 | Status: SHIPPED | OUTPATIENT
Start: 2025-05-25 | End: 2025-06-24

## 2025-05-24 RX ORDER — BUDESONIDE 0.5 MG/2ML
0.5 INHALANT ORAL
Qty: 120 ML | Refills: 0 | Status: SHIPPED | OUTPATIENT
Start: 2025-05-24 | End: 2025-06-23

## 2025-05-24 RX ORDER — CITALOPRAM HYDROBROMIDE 20 MG/1
20 TABLET ORAL DAILY
Qty: 30 TABLET | Refills: 0 | Status: SHIPPED | OUTPATIENT
Start: 2025-05-25 | End: 2025-06-24

## 2025-05-24 RX ORDER — ARFORMOTEROL TARTRATE 15 UG/2ML
15 SOLUTION RESPIRATORY (INHALATION)
Qty: 120 ML | Refills: 0 | Status: SHIPPED | OUTPATIENT
Start: 2025-05-24 | End: 2025-06-23

## 2025-05-24 RX ORDER — LEVOTHYROXINE SODIUM 112 UG/1
112 TABLET ORAL EVERY MORNING
Qty: 30 TABLET | Refills: 0 | Status: SHIPPED | OUTPATIENT
Start: 2025-05-25 | End: 2025-06-24

## 2025-05-24 RX ADMIN — ENOXAPARIN SODIUM 40 MG: 100 INJECTION SUBCUTANEOUS at 09:14

## 2025-05-24 RX ADMIN — BUDESONIDE 0.5 MG: 0.5 SUSPENSION RESPIRATORY (INHALATION) at 09:05

## 2025-05-24 RX ADMIN — IPRATROPIUM BROMIDE AND ALBUTEROL SULFATE 3 ML: 2.5; .5 SOLUTION RESPIRATORY (INHALATION) at 09:05

## 2025-05-24 RX ADMIN — GUAIFENESIN 600 MG: 600 TABLET, EXTENDED RELEASE ORAL at 09:10

## 2025-05-24 RX ADMIN — LEVOTHYROXINE SODIUM 100 MCG: 0.1 TABLET ORAL at 06:09

## 2025-05-24 RX ADMIN — ARFORMOTEROL TARTRATE 15 MCG: 15 SOLUTION RESPIRATORY (INHALATION) at 09:05

## 2025-05-24 RX ADMIN — SODIUM CHLORIDE 1000 MG: 900 INJECTION INTRAVENOUS at 09:11

## 2025-05-24 RX ADMIN — NICOTINE 1 PATCH: 21 PATCH TRANSDERMAL at 09:10

## 2025-05-24 RX ADMIN — SODIUM CHLORIDE 1 G: 1 TABLET ORAL at 09:10

## 2025-05-24 RX ADMIN — DOXYCYCLINE 100 MG: 100 CAPSULE ORAL at 09:10

## 2025-05-24 RX ADMIN — LOSARTAN POTASSIUM 50 MG: 50 TABLET, FILM COATED ORAL at 09:10

## 2025-05-24 RX ADMIN — PREDNISONE 40 MG: 20 TABLET ORAL at 09:10

## 2025-05-24 RX ADMIN — FOLIC ACID 1 MG: 1 TABLET ORAL at 09:10

## 2025-05-24 RX ADMIN — ROSUVASTATIN 40 MG: 20 TABLET, FILM COATED ORAL at 09:10

## 2025-05-24 RX ADMIN — FENOFIBRATE 48 MG: 48 TABLET ORAL at 09:10

## 2025-05-24 RX ADMIN — SODIUM CHLORIDE 1 G: 1 TABLET ORAL at 09:15

## 2025-05-24 RX ADMIN — Medication 1 TABLET: at 09:10

## 2025-05-24 RX ADMIN — IPRATROPIUM BROMIDE AND ALBUTEROL SULFATE 3 ML: 2.5; .5 SOLUTION RESPIRATORY (INHALATION) at 01:33

## 2025-05-24 RX ADMIN — CITALOPRAM 20 MG: 20 TABLET, FILM COATED ORAL at 09:15

## 2025-05-24 RX ADMIN — Medication 10 ML: at 09:14

## 2025-05-24 NOTE — PLAN OF CARE
Goal Outcome Evaluation:  Plan of Care Reviewed With: patient        Progress: improving  Outcome Evaluation: Patient on room air without activity. Sats dropped below 88% with ambulation. Disharge education provided.

## 2025-05-24 NOTE — PLAN OF CARE
Goal Outcome Evaluation:  Plan of Care Reviewed With: patient        Progress: improving  Outcome Evaluation: Patient alert and oriented. On 1 LPM nasal cannula. Normal sinus on monitor. Up ad alexandra. No acute events this shift. Safety precautions in place.

## 2025-05-24 NOTE — DISCHARGE SUMMARY
[facility] Medicine Services  DISCHARGE SUMMARY    Patient Name: Florence Dee  : 1960  MRN: [id number]    Date of Admission: 2025  2:47 AM  Date of Discharge:  25  Primary Care Physician: Joann Hankins DO    Consults       No orders found from 2025 to 2025.            Hospital Course     Presenting Problem: Shortness of breath    Active Hospital Problems    Diagnosis  POA    Hyponatremia [E87.1]  Yes    Dyslipidemia [E78.5]  Yes    History of cigarette smoking [Z87.891]  Not Applicable    Primary hypertension [I10]  Yes    Pulmonary emphysema [J43.9]  Yes    Acquired hypothyroidism [E03.9]  Yes      Resolved Hospital Problems    Diagnosis Date Resolved POA    **COPD exacerbation [J44.1] 2025 Yes    Parainfluenza virus infection [B34.8] 2025 Yes    Acute on chronic respiratory failure with hypoxia [J96.21] 2025 Yes          Hospital Course:  Florence Dee is a 64 y.o. female with past medical history significant for COPD, hypothyroidism, hypertension, hyperlipidemia, history of tobacco abuse.  Patient was admitted to the hospital for severe shortness of breath and respiratory failure secondary to parainfluenza infection, strep pneumoniae pneumonia, and COPD exacerbation.  Patient started on IV antibiotics and IV steroids with significant improvement in respiratory status.  Patient will continue oral steroid taper upon discharge.  Continue inhalers and nebulizers.  Follow-up with PCP in 1 week for repeat sodium check.  Follow-up with pulmonology once scheduled.  Further details documented below.     Acute hypoxic respiratory failure secondary to parainfluenza infection, multifocal pneumonia, and COPD exacerbation  Ongoing tobacco abuse  - Chest x-ray revealing hyperexpanded lungs  - CTA chest negative for pulmonary embolism, however revealing a multifocal pneumonia, mild emphysema  - Respiratory panel positive for parainfluenza  virus  - Respiratory culture positive for strep pneumoniae  -Status post IV steroids and IV fluids  PLAN  - Status post ceftriaxone and doxycycline  - Continue inhalers and nebulizers.  Continue p.o. steroid taper.  - Patient will be discharged on supplemental oxygen.  Wean as tolerated for goal oxygen saturation of 88-92%  - Will need outpatient pulmonology follow-up, referral placed upon discharge,      Hyponatremia, asymptomatic, possibly chronic  - Holding home hydrochlorothiazide  - Will lower dose of citalopram to 20 mg daily.  Start salt tabs.  - Follow-up with PCP in 1 week for repeat sodium check     Elevated LFT's  HLD  - Continue home medications     HTN / HLD  - Continue ARB, hold HCTZ 2/2 low sodium     Hypothyroidism  - Mild elevation in TSH, mild decrease in free T4  - Will increase levothyroxine to 112 mcg.  Follow-up with PCP in 4-6 weeks for repeat thyroid testing     Alcohol abuse  - Encouraged alcohol cessation     Mood disorder  - Citalopram per above     Aneurysmal dilation of ascending aorta  - Incidental finding, dilatation of 43 mm.  Will need outpatient surveillance with PCP.       Discharge Follow Up Recommendations for outpatient labs/diagnostics:   Continue nebulizers as scheduled.  Follow-up with PCP in 1 week for repeat sodium check.  Continue steroid taper.  Follow-up with PCP in 4 to 6 weeks for repeat thyroid test.  Follow-up with pulmonology when scheduled.  Continue reduced dose Celexa, and increased dose of Synthroid.  Continue salt tabs for now.     Day of Discharge     HPI:   Patient seen resting comfortably in bed in no acute distress.  Saturating well on room air.  However, requiring supplemental oxygen with exertion.  Patient with significantly improved respiratory status.  Eager for discharge home today.    Vital Signs:   Temp:  [96.7 °F (35.9 °C)-98.4 °F (36.9 °C)] 96.7 °F (35.9 °C)  Heart Rate:  [70-85] 80  Resp:  [16] 16  BP: (132-144)/(70-94) 132/70  Flow (L/min)  (Oxygen Therapy):  [1] 1      Physical Exam  Constitutional:       General: She is not in acute distress.  Cardiovascular:      Rate and Rhythm: Normal rate.      Pulses: Normal pulses.   Pulmonary:      Effort: Pulmonary effort is normal. No respiratory distress.   Abdominal:      General: There is no distension.      Palpations: Abdomen is soft.      Tenderness: There is no abdominal tenderness. There is no guarding or rebound.   Musculoskeletal:      Right lower leg: No edema.      Left lower leg: No edema.   Skin:     General: Skin is warm.   Neurological:      Mental Status: She is alert and oriented to person, place, and time.   Psychiatric:         Mood and Affect: Mood normal.         Behavior: Behavior normal.           Pertinent  and/or Most Recent Results     LAB RESULTS:      Lab 05/20/25  0420 05/19/25  0320   WBC 10.51 7.14   HEMOGLOBIN 13.1 14.4   HEMATOCRIT 38.7 43.2   PLATELETS 236 254   NEUTROS ABS 9.37* 6.01   IMMATURE GRANS (ABS) 0.05 0.04   LYMPHS ABS 0.50* 0.55*   MONOS ABS 0.58 0.45   EOS ABS 0.00 0.07   MCV 99.0* 100.0*   LACTATE  --  1.3   D DIMER QUANT  --  0.63*         Lab 05/23/25  0410 05/23/25  0026 05/22/25  2005 05/22/25  1623 05/22/25  1343 05/22/25  0353 05/22/25  0038 05/21/25  0402 05/21/25  0013 05/20/25  0807 05/20/25  0420 05/19/25  0809 05/19/25  0320   SODIUM 129* 128* 128* 128* 129*   < > 127*   < > 125*  125*   < > 125*  125*   < > 125*   POTASSIUM  --   --   --   --   --   --  4.4  --  4.4  --  4.3  --  4.2   CHLORIDE  --   --   --   --   --   --  93*  --  91*  --  89*  --  88*   CO2  --   --   --   --   --   --  25.0  --  25.0  --  26.0  --  26.0   ANION GAP  --   --   --   --   --   --  9.0  --  9.0  --  10.0  --  11.0   BUN  --   --   --   --   --   --  12  --  11  --  10  --  7*   CREATININE  --   --   --   --   --   --  0.47*  --  0.54*  --  0.52*  --  0.57   EGFR  --   --   --   --   --   --  107.8  --  104.2  --  105.2  --  102.9   GLUCOSE  --   --   --   --   --    --  159*  --  139*  --  145*  --  106*   CALCIUM  --   --   --   --   --   --  8.4*  --  8.5*  --  8.6  --  8.4*   MAGNESIUM  --   --   --   --   --   --  1.9  --   --   --  2.0  --   --    TSH 7.780*  --   --   --   --   --   --   --   --   --   --   --   --     < > = values in this interval not displayed.         Lab 05/20/25  0420 05/19/25  0320   TOTAL PROTEIN 6.1 6.8   ALBUMIN 3.8 4.3   GLOBULIN 2.3 2.5   ALT (SGPT) 51* 85*   AST (SGOT) 38* 81*   BILIRUBIN 0.3 0.3   ALK PHOS 65 87         Lab 05/19/25  0506 05/19/25  0320   PROBNP  --  36.6   HSTROP T <6 <6                 Lab 05/19/25  0340   PH, ARTERIAL 7.412   PCO2, ARTERIAL 44.1   PO2 ART 76.4*   FIO2 36   HCO3 ART 28.0*   BASE EXCESS ART 2.9*   CARBOXYHEMOGLOBIN 3.2*     Brief Urine Lab Results       None          Microbiology Results (last 10 days)       Procedure Component Value - Date/Time    Respiratory Culture - Sputum, Cough [[id number]]  (Abnormal)  (Susceptibility) Collected: 05/19/25 1514    Lab Status: Final result Specimen: Sputum from Cough Updated: 05/22/25 0840     Respiratory Culture Heavy growth (4+) Streptococcus pneumoniae      Light growth (2+) Normal Respiratory Mandy     Gram Stain Many (4+) WBCs per low power field      Rare (1+) Epithelial cells per low power field      Many (4+) Gram positive cocci in pairs and chains    Narrative:            Susceptibility        Streptococcus pneumoniae      CEE      Ceftriaxone (Meningitis) Susceptible      Ceftriaxone (Non-meningitis) Susceptible      Levofloxacin Susceptible      Penicillin (Meningitis) Susceptible      Penicillin (Non-Meningitis) Susceptible                           Blood Culture - Blood, Arm, Left [[id number]]  (Normal) Collected: 05/19/25 0506    Lab Status: Final result Specimen: Blood from Arm, Left Updated: 05/24/25 0815     Blood Culture No growth at 5 days    Respiratory Panel PCR w/COVID-19(SARS-CoV-2) [facility]VIN/WOODY/[facility] In-House, NP Swab in [facility]/VTM, 2 HR TAT -  Swab, Nasopharynx [[id number]]  (Abnormal) Collected: 05/19/25 0327    Lab Status: Final result Specimen: Swab from Nasopharynx Updated: 05/19/25 0609     ADENOVIRUS, PCR Not Detected     Coronavirus 229E Not Detected     Coronavirus HKU1 Not Detected     Coronavirus NL63 Not Detected     Coronavirus OC43 Not Detected     COVID19 Not Detected     Human Metapneumovirus Not Detected     Human Rhinovirus/Enterovirus Not Detected     Influenza A PCR Not Detected     Influenza B PCR Not Detected     Parainfluenza Virus 1 Not Detected     Parainfluenza Virus 2 Not Detected     Parainfluenza Virus 3 Detected     Parainfluenza Virus 4 Not Detected     RSV, PCR Not Detected     Bordetella pertussis pcr Not Detected     Bordetella parapertussis PCR Not Detected     Chlamydophila pneumoniae PCR Not Detected     Mycoplasma pneumo by PCR Not Detected    Narrative:      In the setting of a positive respiratory panel with a viral infection PLUS a negative procalcitonin without other underlying concern for bacterial infection, consider observing off antibiotics or discontinuation of antibiotics and continue supportive care. If the respiratory panel is positive for atypical bacterial infection (Bordetella pertussis, Chlamydophila pneumoniae, or Mycoplasma pneumoniae), consider antibiotic de-escalation to target atypical bacterial infection.            CT Angiogram Chest  Result Date: 5/20/2025  CT ANGIOGRAM CHEST Date of Exam: 5/20/2025 4:31 AM EDT Indication: SOB, elevated D-dimer. Comparison: Chest radiograph 5/19/2025 Technique: CTA of the chest was performed before and after the uneventful intravenous administration of 75 mL Isovue-370. Reconstructed coronal and sagittal images were also obtained. In addition, a 3-D volume rendered image was created for interpretation. Automated exposure control and iterative reconstruction methods were used. Findings: Thyroid is atrophic and not well seen. The trachea and esophagus appear within  normal limits. Heart size is normal. Minimal coronary artery calcification. No pericardial effusion or mediastinal lymphadenopathy. There is aneurysmal dilatation of the ascending aorta up to 43 mm. There is minimal aortic atherosclerosis. Main pulmonary artery is normal diameter. No evidence of pulmonary embolism. There is patchy airspace disease in the left lung and there is tree-in-bud nodularity in the right lower lobe consistent with multifocal pneumonia. There is associated peribronchial thickening, which is most pronounced in the left dependent lung. There is mild reactive left hilar adenopathy. Airways are patent. No pneumothorax or pleural effusion. There is mild emphysema. No acute findings in the superficial soft tissues or within the limited images of the upper abdomen. No acute osseous abnormality or significant osseous degenerative change.     Impression: 1.No evidence of pulmonary embolism. 2.Multifocal pneumonia, most pronounced in the left lung. 3.Mild emphysema. 4.Aneurysmal dilatation of the ascending aorta up to 43 mm. Electronically Signed: Marshall Craig MD  5/20/2025 4:42 AM EDT  Workstation ID: [id number]    XR Chest 1 View  Result Date: 5/19/2025  XR CHEST 1 VW Date of Exam: 5/19/2025 3:11 AM EDT Indication: SOA triage protocol Comparison: None available. Findings: The lungs are hyperexpanded. There is interstitial prominence. There is no pneumothorax, pleural effusion or focal airspace consolidation. Heart size and pulmonary vasculature appear within normal limits. Regional bones appear intact.     Impression: Hyperexpanded lungs with interstitial prominence. Correlate for COPD. Electronically Signed: Marshall Craig MD  5/19/2025 4:18 AM EDT  Workstation ID: [id number]                  Plan for Follow-up of Pending Labs/Results: N/A    Discharge Details        Discharge Medications        New Medications        Instructions Start Date   arformoterol 15 MCG/2ML nebulizer solution  Commonly known  as: BROVANA   15 mcg, Nebulization, 2 Times Daily - RT      budesonide 0.5 MG/2ML nebulizer solution  Commonly known as: PULMICORT   0.5 mg, Nebulization, 2 Times Daily - RT      folic acid 1 MG tablet  Commonly known as: FOLVITE   1 mg, Oral, Daily   Start Date: May 25, 2025     guaiFENesin 600 MG 12 hr tablet  Commonly known as: MUCINEX   600 mg, Oral, Every 12 Hours Scheduled      predniSONE 5 MG tablet  Commonly known as: DELTASONE   Take 8 tablets by mouth Daily With Breakfast for 2 days, THEN 6 tablets Daily With Breakfast for 3 days, THEN 4 tablets Daily With Breakfast for 3 days, THEN 2 tablets Daily With Breakfast for 3 days, THEN 1 tablet Daily With Breakfast for 3 days.   Start Date: May 25, 2025     sodium chloride 1 g tablet   1 g, Oral, 2 Times Daily With Meals      thiamine 100 MG tablet  Commonly known as: VITAMIN B1   100 mg, Oral, Daily   Start Date: May 25, 2025            Changes to Medications        Instructions Start Date   citalopram 20 MG tablet  Commonly known as: CeleXA  What changed:   medication strength  how much to take   20 mg, Oral, Daily   Start Date: May 25, 2025     levothyroxine 112 MCG tablet  Commonly known as: SYNTHROID, LEVOTHROID  What changed:   medication strength  how much to take   112 mcg, Oral, Every Morning   Start Date: May 25, 2025            Continue These Medications        Instructions Start Date   albuterol 1.25 MG/3ML nebulizer solution  Commonly known as: ACCUNEB   1.25 mg, Nebulization, Every 6 Hours PRN      Ventolin  (90 Base) MCG/ACT inhaler  Generic drug: albuterol sulfate HFA   2 puffs, Inhalation, Every 4 Hours PRN      fenofibrate 48 MG tablet  Commonly known as: Tricor   48 mg, Oral, Daily      montelukast 10 MG tablet  Commonly known as: SINGULAIR   10 mg, Oral, Nightly      olmesartan-hydrochlorothiazide 20-12.5 MG per tablet  Commonly known as: BENICAR HCT   1 tablet, Oral, Daily      rosuvastatin 40 MG tablet  Commonly known as: CRESTOR    40 mg, Oral, Daily               No Known Allergies      Discharge Disposition:  Home or Self Care    Diet:  Hospital:  Diet Order   Procedures    Diet: Regular/House, Fluid Restriction (240 mL/tray); 1500 mL/day; Fluid Consistency: Thin (IDDSI 0)            Activity: As tolerated      Restrictions or Other Recommendations:  As tolerated       CODE STATUS:    Code Status and Medical Interventions: CPR (Attempt to Resuscitate); Full Support   Ordered at: 05/19/25 0610     Code Status (Patient has no pulse and is not breathing):    CPR (Attempt to Resuscitate)     Medical Interventions (Patient has pulse or is breathing):    Full Support       Future Appointments   Date Time Provider Department Center   7/2/2025 12:00 PM Joann Hankins DO [facility]       Additional Instructions for the Follow-ups that You Need to Schedule       Discharge Follow-up with PCP   As directed       Currently Documented PCP:    Joann Hankins DO    PCP Phone Number:    [phone]     Follow Up Details: Follow-up with PCP in 1 week for repeat sodium check.  Will also need repeat thyroid test in 4 to 6 weeks.        Discharge Follow-up with Specialty: Follow-up with pulmonology when scheduled   As directed      Specialty: Follow-up with pulmonology when scheduled                      Colin Almanzar DO  05/24/25      Time Spent on Discharge:  I spent  35  minutes on this discharge activity which included: face-to-face encounter with the patient, reviewing the data in the system, coordination of the care with the nursing staff as well as consultants, documentation, and entering orders.

## 2025-05-24 NOTE — OUTREACH NOTE
Prep Survey      Flowsheet Row Responses   [facility] patient discharged from? [place]   Is LACE score < 7 ? No   Eligibility [facility]   Date of Admission 05/19/25   Date of Discharge 05/24/25   Discharge Disposition Home or Self Care   Discharge diagnosis Acute hypoxic respiratory failure secondary to parainfluenza infection, multifocal pneumonia, and COPD exacerbation   Does the patient have one of the following disease processes/diagnoses(primary or secondary)? COPD   Does the patient have Home health ordered? No   Is there a DME ordered? Yes   What DME was ordered? rollator, O2 2L [place]   Prep survey completed? Yes            Lili GONZALEZ - Registered Nurse

## 2025-05-24 NOTE — CASE MANAGEMENT/SOCIAL WORK
Case Management Discharge Note      Final Note: Pt is discharging home today. A referral for home O2 was called to Myranda with [employer] @ 1244 and an order was entered in [employer]. A portable tank will be delivered to bedside prior to discharge. Per prior CM's note, [employer] has already delivered a rollator to the bedside. Spouse will provide transportation via private vehicle. No other discharge needs identified.         Selected Continued Care - Admitted Since 5/19/2025       Destination    No services have been selected for the patient.                Durable Medical Equipment       Service Provider Services Address Phone Fax Patient Preferred    [facility] [place] [facility] Oxygen Equipment and Accessories [address] [place] [address] [id number] [phone] --       Internal Comment last updated by Click, Samantha MTAHEWS RN 5/23/2025 [id number]    Rollator                          Dialysis/Infusion    No services have been selected for the patient.                Home Medical Care    No services have been selected for the patient.                Therapy    No services have been selected for the patient.                Community Resources    No services have been selected for the patient.                Community & DME    No services have been selected for the patient.                    Transportation Services  Private: Car    Final Discharge Disposition Code: 01 - home or self-care

## 2025-05-27 ENCOUNTER — TRANSITIONAL CARE MANAGEMENT TELEPHONE ENCOUNTER (OUTPATIENT)
Dept: CALL CENTER | Facility: HOSPITAL | Age: 65
End: 2025-05-27
Payer: COMMERCIAL

## 2025-05-27 NOTE — PAYOR COMM NOTE
"Alin Anglin \"Rj\" (64 y.o. Female)     [id number]     Myranda Handy RN  Utilization Review  [phone]  [phone]    Case denied due to lack of clinical        Date of Birth   1960    Social Security Number       Address   [address]  [place] [address]    Home Phone   [phone]    MRN   [id number]       Zoroastrianism   None    Marital Status                               Admission Date   2025    Admission Type   Emergency    Admitting Provider   Colin Almanzar DO    Attending Provider       Department, Room/Bed   [facility] 6B, N645/1       Discharge Date   2025    Discharge Disposition   Home or Self Care    Discharge Destination   Home                              Attending Provider: (none)   Allergies: No Known Allergies    Isolation: None   Infection: Other (25)   Code Status: Prior    Ht: 162.6 cm (64\")   Wt: 58.1 kg (128 lb)    Admission Cmt: None   Principal Problem: COPD exacerbation [J44.1]                   Active Insurance as of 2025       Primary Coverage       Payor Plan Insurance Group Employer/Plan Group    [facility] [id number]       Payor Plan Address Payor Plan Phone Number Payor Plan Fax Number Effective Dates    [address] [address]   2016 - None Entered    [facility] [id number]         Subscriber Name Subscriber Birth Date Member ID       ALIN ANGLIN 1960 [id number]                     Emergency Contacts        (Rel.) Home Phone Work Phone Mobile Phone    Remi Anglin (Spouse) [phone]                 Discharge Summary        Colin Almanzar DO at 25 1248              [facility]  DISCHARGE SUMMARY    Patient Name: Alin Anglin  : 1960  MRN: [id number]    Date of Admission: 2025  2:47 AM  Date of Discharge:  25  Primary Care Physician: Joann Hankins DO    Consults       " No orders found from 4/20/2025 to 5/20/2025.            Hospital Course     Presenting Problem: Shortness of breath    Active Hospital Problems    Diagnosis  POA    Hyponatremia [E87.1]  Yes    Dyslipidemia [E78.5]  Yes    History of cigarette smoking [Z87.891]  Not Applicable    Primary hypertension [I10]  Yes    Pulmonary emphysema [J43.9]  Yes    Acquired hypothyroidism [E03.9]  Yes      Resolved Hospital Problems    Diagnosis Date Resolved POA    **COPD exacerbation [J44.1] 05/24/2025 Yes    Parainfluenza virus infection [B34.8] 05/24/2025 Yes    Acute on chronic respiratory failure with hypoxia [J96.21] 05/24/2025 Yes          Hospital Course:  Florence Dee is a 64 y.o. female with past medical history significant for COPD, hypothyroidism, hypertension, hyperlipidemia, history of tobacco abuse.  Patient was admitted to the hospital for severe shortness of breath and respiratory failure secondary to parainfluenza infection, strep pneumoniae pneumonia, and COPD exacerbation.  Patient started on IV antibiotics and IV steroids with significant improvement in respiratory status.  Patient will continue oral steroid taper upon discharge.  Continue inhalers and nebulizers.  Follow-up with PCP in 1 week for repeat sodium check.  Follow-up with pulmonology once scheduled.  Further details documented below.     Acute hypoxic respiratory failure secondary to parainfluenza infection, multifocal pneumonia, and COPD exacerbation  Ongoing tobacco abuse  - Chest x-ray revealing hyperexpanded lungs  - CTA chest negative for pulmonary embolism, however revealing a multifocal pneumonia, mild emphysema  - Respiratory panel positive for parainfluenza virus  - Respiratory culture positive for strep pneumoniae  -Status post IV steroids and IV fluids  PLAN  - Status post ceftriaxone and doxycycline  - Continue inhalers and nebulizers.  Continue p.o. steroid taper.  - Patient will be discharged on supplemental oxygen.  Wean as  tolerated for goal oxygen saturation of 88-92%  - Will need outpatient pulmonology follow-up, referral placed upon discharge,      Hyponatremia, asymptomatic, possibly chronic  - Holding home hydrochlorothiazide  - Will lower dose of citalopram to 20 mg daily.  Start salt tabs.  - Follow-up with PCP in 1 week for repeat sodium check     Elevated LFT's  HLD  - Continue home medications     HTN / HLD  - Continue ARB, hold HCTZ 2/2 low sodium     Hypothyroidism  - Mild elevation in TSH, mild decrease in free T4  - Will increase levothyroxine to 112 mcg.  Follow-up with PCP in 4-6 weeks for repeat thyroid testing     Alcohol abuse  - Encouraged alcohol cessation     Mood disorder  - Citalopram per above     Aneurysmal dilation of ascending aorta  - Incidental finding, dilatation of 43 mm.  Will need outpatient surveillance with PCP.       Discharge Follow Up Recommendations for outpatient labs/diagnostics:   Continue nebulizers as scheduled.  Follow-up with PCP in 1 week for repeat sodium check.  Continue steroid taper.  Follow-up with PCP in 4 to 6 weeks for repeat thyroid test.  Follow-up with pulmonology when scheduled.  Continue reduced dose Celexa, and increased dose of Synthroid.  Continue salt tabs for now.     Day of Discharge     HPI:   Patient seen resting comfortably in bed in no acute distress.  Saturating well on room air.  However, requiring supplemental oxygen with exertion.  Patient with significantly improved respiratory status.  Eager for discharge home today.    Vital Signs:   Temp:  [96.7 °F (35.9 °C)-98.4 °F (36.9 °C)] 96.7 °F (35.9 °C)  Heart Rate:  [70-85] 80  Resp:  [16] 16  BP: (132-144)/(70-94) 132/70  Flow (L/min) (Oxygen Therapy):  [1] 1      Physical Exam  Constitutional:       General: She is not in acute distress.  Cardiovascular:      Rate and Rhythm: Normal rate.      Pulses: Normal pulses.   Pulmonary:      Effort: Pulmonary effort is normal. No respiratory distress.   Abdominal:       General: There is no distension.      Palpations: Abdomen is soft.      Tenderness: There is no abdominal tenderness. There is no guarding or rebound.   Musculoskeletal:      Right lower leg: No edema.      Left lower leg: No edema.   Skin:     General: Skin is warm.   Neurological:      Mental Status: She is alert and oriented to person, place, and time.   Psychiatric:         Mood and Affect: Mood normal.         Behavior: Behavior normal.           Pertinent  and/or Most Recent Results     LAB RESULTS:      Lab 05/20/25  0420 05/19/25  0320   WBC 10.51 7.14   HEMOGLOBIN 13.1 14.4   HEMATOCRIT 38.7 43.2   PLATELETS 236 254   NEUTROS ABS 9.37* 6.01   IMMATURE GRANS (ABS) 0.05 0.04   LYMPHS ABS 0.50* 0.55*   MONOS ABS 0.58 0.45   EOS ABS 0.00 0.07   MCV 99.0* 100.0*   LACTATE  --  1.3   D DIMER QUANT  --  0.63*         Lab 05/23/25  0410 05/23/25  0026 05/22/25 2005 05/22/25  1623 05/22/25  1343 05/22/25  0353 05/22/25  0038 05/21/25  0402 05/21/25  0013 05/20/25  0807 05/20/25  0420 05/19/25  0809 05/19/25  0320   SODIUM 129* 128* 128* 128* 129*   < > 127*   < > 125*  125*   < > 125*  125*   < > 125*   POTASSIUM  --   --   --   --   --   --  4.4  --  4.4  --  4.3  --  4.2   CHLORIDE  --   --   --   --   --   --  93*  --  91*  --  89*  --  88*   CO2  --   --   --   --   --   --  25.0  --  25.0  --  26.0  --  26.0   ANION GAP  --   --   --   --   --   --  9.0  --  9.0  --  10.0  --  11.0   BUN  --   --   --   --   --   --  12  --  11  --  10  --  7*   CREATININE  --   --   --   --   --   --  0.47*  --  0.54*  --  0.52*  --  0.57   EGFR  --   --   --   --   --   --  107.8  --  104.2  --  105.2  --  102.9   GLUCOSE  --   --   --   --   --   --  159*  --  139*  --  145*  --  106*   CALCIUM  --   --   --   --   --   --  8.4*  --  8.5*  --  8.6  --  8.4*   MAGNESIUM  --   --   --   --   --   --  1.9  --   --   --  2.0  --   --    TSH 7.780*  --   --   --   --   --   --   --   --   --   --   --   --     < > = values in  this interval not displayed.         Lab 05/20/25  0420 05/19/25  0320   TOTAL PROTEIN 6.1 6.8   ALBUMIN 3.8 4.3   GLOBULIN 2.3 2.5   ALT (SGPT) 51* 85*   AST (SGOT) 38* 81*   BILIRUBIN 0.3 0.3   ALK PHOS 65 87         Lab 05/19/25  0506 05/19/25  0320   PROBNP  --  36.6   HSTROP T <6 <6                 Lab 05/19/25  0340   PH, ARTERIAL 7.412   PCO2, ARTERIAL 44.1   PO2 ART 76.4*   FIO2 36   HCO3 ART 28.0*   BASE EXCESS ART 2.9*   CARBOXYHEMOGLOBIN 3.2*     Brief Urine Lab Results       None          Microbiology Results (last 10 days)       Procedure Component Value - Date/Time    Respiratory Culture - Sputum, Cough [[id number]]  (Abnormal)  (Susceptibility) Collected: 05/19/25 1514    Lab Status: Final result Specimen: Sputum from Cough Updated: 05/22/25 0840     Respiratory Culture Heavy growth (4+) Streptococcus pneumoniae      Light growth (2+) Normal Respiratory Mandy     Gram Stain Many (4+) WBCs per low power field      Rare (1+) Epithelial cells per low power field      Many (4+) Gram positive cocci in pairs and chains    Narrative:            Susceptibility        Streptococcus pneumoniae      CEE      Ceftriaxone (Meningitis) Susceptible      Ceftriaxone (Non-meningitis) Susceptible      Levofloxacin Susceptible      Penicillin (Meningitis) Susceptible      Penicillin (Non-Meningitis) Susceptible                           Blood Culture - Blood, Arm, Left [[id number]]  (Normal) Collected: 05/19/25 0506    Lab Status: Final result Specimen: Blood from Arm, Left Updated: 05/24/25 0815     Blood Culture No growth at 5 days    Respiratory Panel PCR w/COVID-19(SARS-CoV-2) [facility]/NAVIN/WOODY/PAD/COR/RODRI In-House, NP Swab in UTM/VTM, 2 HR TAT - Swab, Nasopharynx [[id number]]  (Abnormal) Collected: 05/19/25 0327    Lab Status: Final result Specimen: Swab from Nasopharynx Updated: 05/19/25 0609     ADENOVIRUS, PCR Not Detected     Coronavirus 229E Not Detected     Coronavirus HKU1 Not Detected     Coronavirus NL63 Not  Detected     Coronavirus OC43 Not Detected     COVID19 Not Detected     Human Metapneumovirus Not Detected     Human Rhinovirus/Enterovirus Not Detected     Influenza A PCR Not Detected     Influenza B PCR Not Detected     Parainfluenza Virus 1 Not Detected     Parainfluenza Virus 2 Not Detected     Parainfluenza Virus 3 Detected     Parainfluenza Virus 4 Not Detected     RSV, PCR Not Detected     Bordetella pertussis pcr Not Detected     Bordetella parapertussis PCR Not Detected     Chlamydophila pneumoniae PCR Not Detected     Mycoplasma pneumo by PCR Not Detected    Narrative:      In the setting of a positive respiratory panel with a viral infection PLUS a negative procalcitonin without other underlying concern for bacterial infection, consider observing off antibiotics or discontinuation of antibiotics and continue supportive care. If the respiratory panel is positive for atypical bacterial infection (Bordetella pertussis, Chlamydophila pneumoniae, or Mycoplasma pneumoniae), consider antibiotic de-escalation to target atypical bacterial infection.            CT Angiogram Chest  Result Date: 5/20/2025  CT ANGIOGRAM CHEST Date of Exam: 5/20/2025 4:31 AM EDT Indication: SOB, elevated D-dimer. Comparison: Chest radiograph 5/19/2025 Technique: CTA of the chest was performed before and after the uneventful intravenous administration of 75 mL Isovue-370. Reconstructed coronal and sagittal images were also obtained. In addition, a 3-D volume rendered image was created for interpretation. Automated exposure control and iterative reconstruction methods were used. Findings: Thyroid is atrophic and not well seen. The trachea and esophagus appear within normal limits. Heart size is normal. Minimal coronary artery calcification. No pericardial effusion or mediastinal lymphadenopathy. There is aneurysmal dilatation of the ascending aorta up to 43 mm. There is minimal aortic atherosclerosis. Main pulmonary artery is normal  diameter. No evidence of pulmonary embolism. There is patchy airspace disease in the left lung and there is tree-in-bud nodularity in the right lower lobe consistent with multifocal pneumonia. There is associated peribronchial thickening, which is most pronounced in the left dependent lung. There is mild reactive left hilar adenopathy. Airways are patent. No pneumothorax or pleural effusion. There is mild emphysema. No acute findings in the superficial soft tissues or within the limited images of the upper abdomen. No acute osseous abnormality or significant osseous degenerative change.     Impression: 1.No evidence of pulmonary embolism. 2.Multifocal pneumonia, most pronounced in the left lung. 3.Mild emphysema. 4.Aneurysmal dilatation of the ascending aorta up to 43 mm. Electronically Signed: Marshall Craig MD  5/20/2025 4:42 AM EDT  Workstation ID: [id number]    XR Chest 1 View  Result Date: 5/19/2025  XR CHEST 1 VW Date of Exam: 5/19/2025 3:11 AM EDT Indication: SOA triage protocol Comparison: None available. Findings: The lungs are hyperexpanded. There is interstitial prominence. There is no pneumothorax, pleural effusion or focal airspace consolidation. Heart size and pulmonary vasculature appear within normal limits. Regional bones appear intact.     Impression: Hyperexpanded lungs with interstitial prominence. Correlate for COPD. Electronically Signed: Marshall Craig MD  5/19/2025 4:18 AM EDT  Workstation ID: [id number]                  Plan for Follow-up of Pending Labs/Results: N/A    Discharge Details        Discharge Medications        New Medications        Instructions Start Date   arformoterol 15 MCG/2ML nebulizer solution  Commonly known as: BROVANA   15 mcg, Nebulization, 2 Times Daily - RT      budesonide 0.5 MG/2ML nebulizer solution  Commonly known as: PULMICORT   0.5 mg, Nebulization, 2 Times Daily - RT      folic acid 1 MG tablet  Commonly known as: FOLVITE   1 mg, Oral, Daily   Start Date: May 25,  2025     guaiFENesin 600 MG 12 hr tablet  Commonly known as: MUCINEX   600 mg, Oral, Every 12 Hours Scheduled      predniSONE 5 MG tablet  Commonly known as: DELTASONE   Take 8 tablets by mouth Daily With Breakfast for 2 days, THEN 6 tablets Daily With Breakfast for 3 days, THEN 4 tablets Daily With Breakfast for 3 days, THEN 2 tablets Daily With Breakfast for 3 days, THEN 1 tablet Daily With Breakfast for 3 days.   Start Date: May 25, 2025     sodium chloride 1 g tablet   1 g, Oral, 2 Times Daily With Meals      thiamine 100 MG tablet  Commonly known as: VITAMIN B1   100 mg, Oral, Daily   Start Date: May 25, 2025            Changes to Medications        Instructions Start Date   citalopram 20 MG tablet  Commonly known as: CeleXA  What changed:   medication strength  how much to take   20 mg, Oral, Daily   Start Date: May 25, 2025     levothyroxine 112 MCG tablet  Commonly known as: SYNTHROID, LEVOTHROID  What changed:   medication strength  how much to take   112 mcg, Oral, Every Morning   Start Date: May 25, 2025            Continue These Medications        Instructions Start Date   albuterol 1.25 MG/3ML nebulizer solution  Commonly known as: ACCUNEB   1.25 mg, Nebulization, Every 6 Hours PRN      Ventolin  (90 Base) MCG/ACT inhaler  Generic drug: albuterol sulfate HFA   2 puffs, Inhalation, Every 4 Hours PRN      fenofibrate 48 MG tablet  Commonly known as: Tricor   48 mg, Oral, Daily      montelukast 10 MG tablet  Commonly known as: SINGULAIR   10 mg, Oral, Nightly      olmesartan-hydrochlorothiazide 20-12.5 MG per tablet  Commonly known as: BENICAR HCT   1 tablet, Oral, Daily      rosuvastatin 40 MG tablet  Commonly known as: CRESTOR   40 mg, Oral, Daily               No Known Allergies      Discharge Disposition:  Home or Self Care    Diet:  Hospital:  Diet Order   Procedures    Diet: Regular/House, Fluid Restriction (240 mL/tray); 1500 mL/day; Fluid Consistency: Thin (IDDSI 0)            Activity: As  tolerated      Restrictions or Other Recommendations:  As tolerated       CODE STATUS:    Code Status and Medical Interventions: CPR (Attempt to Resuscitate); Full Support   Ordered at: 05/19/25 0610     Code Status (Patient has no pulse and is not breathing):    CPR (Attempt to Resuscitate)     Medical Interventions (Patient has pulse or is breathing):    Full Support       Future Appointments   Date Time Provider Department Center   7/2/2025 12:00 PM Joann Hankins DO [facility]       Additional Instructions for the Follow-ups that You Need to Schedule       Discharge Follow-up with PCP   As directed       Currently Documented PCP:    Joann Hankins DO    PCP Phone Number:    [phone]     Follow Up Details: Follow-up with PCP in 1 week for repeat sodium check.  Will also need repeat thyroid test in 4 to 6 weeks.        Discharge Follow-up with Specialty: Follow-up with pulmonology when scheduled   As directed      Specialty: Follow-up with pulmonology when scheduled                      Colin Almanzar DO  05/24/25      Time Spent on Discharge:  I spent  35  minutes on this discharge activity which included: face-to-face encounter with the patient, reviewing the data in the system, coordination of the care with the nursing staff as well as consultants, documentation, and entering orders.            Electronically signed by Colin Almanzar DO at 05/24/25 6651

## 2025-05-27 NOTE — OUTREACH NOTE
Call Center TCM Note      Flowsheet Row Responses   [facility] patient discharged from? [place]   Does the patient have one of the following disease processes/diagnoses(primary or secondary)? COPD   TCM attempt successful? No   Call start time 0909   Unsuccessful attempts Attempt 1            Steffi Caldwell Registered Nurse    5/27/2025, 09:09 EDT

## 2025-05-27 NOTE — OUTREACH NOTE
Call Center TCM Note      Flowsheet Row Responses   [facility] facility patient discharged from? [place]   Does the patient have one of the following disease processes/diagnoses(primary or secondary)? COPD   TCM attempt successful? No   Unsuccessful attempts Attempt 2            Steffi Caldwell Registered Nurse    5/27/2025, 09:51 EDT

## 2025-05-28 ENCOUNTER — TRANSITIONAL CARE MANAGEMENT TELEPHONE ENCOUNTER (OUTPATIENT)
Dept: CALL CENTER | Facility: HOSPITAL | Age: 65
End: 2025-05-28
Payer: COMMERCIAL

## 2025-05-28 NOTE — OUTREACH NOTE
Call Center TCM Note      Flowsheet Row Responses   [facility] [place] patient discharged from? [place]   Does the patient have one of the following disease processes/diagnoses(primary or secondary)? COPD   TCM attempt successful? Yes   Call start time 0813   Call end time 0824   Discharge diagnosis Acute hypoxic respiratory failure secondary to parainfluenza infection, multifocal pneumonia, and COPD exacerbation, hyponatremia   Person spoke with today (if not patient) and relationship Patient   Meds reviewed with patient/caregiver? Yes   Does the patient have all medications ordered at discharge? Yes   Is the patient taking all medications as directed (includes completed medication regime)? Yes   Comments PCP DR Hankins. Patient has PCP office visit in place prior to call today for this Friday 5/30  230pm with PCP. Message routed to office.   Does the patient have an appointment with their PCP within 7-14 days of discharge? Other  [Patient has office visit in place within timeframe.]   Nursing Interventions Confirmed date/time of appointment, Routed TCM call to PCP office   Has home health visited the patient within 72 hours of discharge? N/A   Has all DME been delivered? Yes   DME comments Patient states that they have already came and picked up the oxygen, because she doesnt need it.   Pulse Ox monitoring Intermittent   Pulse Ox device source Patient   O2 Sat comments Patient reports O2 sat 93% on room air.   O2 Sat: education provided Sat levels, Monitoring frequency, When to seek care   O2 Sat education comments Wean as tolerated for goal oxygen saturation of 88-92%   Psychosocial issues? No   Did the patient receive a copy of their discharge instructions? Yes   Nursing interventions Reviewed instructions with patient   What is the patient's perception of their health status since discharge? Improving   If the patient is a current smoker, are they able to teach back resources for cessation? Not a smoker   Is the  patient/caregiver able to teach back the hierarchy of who to call/visit for symptoms/problems? PCP, Specialist, Home health nurse, Urgent Care, ED, 911 Yes   Yellow Zone Using quick relief inhaler/nebulizer more often  [Patient reports that she is feeling a little better every day. Reports that the nebulizer treatments are helping.]   Yellow interventions Continue taking daily medications, Use oxygen as prescribed, Start an oral corticosteroid if ordered, Get plenty of rest, Call provider immediately if symptoms don't improve: they may indicate that an adjustment in medication or oxygen therapy is needed   TCM call completed? Yes   Call end time 0824   Would this patient benefit from a Referral to [facility] Social Work? No   Is the patient interested in additional calls from an ambulatory ? No            BRITTANEY HOUSE - Registered Nurse    5/28/2025, 08:30 EDT

## 2025-05-30 ENCOUNTER — OFFICE VISIT (OUTPATIENT)
Dept: FAMILY MEDICINE CLINIC | Facility: CLINIC | Age: 65
End: 2025-05-30
Payer: COMMERCIAL

## 2025-05-30 VITALS
SYSTOLIC BLOOD PRESSURE: 124 MMHG | BODY MASS INDEX: 20.79 KG/M2 | HEART RATE: 69 BPM | WEIGHT: 121.8 LBS | TEMPERATURE: 97.7 F | HEIGHT: 64 IN | OXYGEN SATURATION: 92 % | DIASTOLIC BLOOD PRESSURE: 84 MMHG

## 2025-05-30 DIAGNOSIS — J44.1 COPD EXACERBATION: ICD-10-CM

## 2025-05-30 DIAGNOSIS — E55.9 VITAMIN D DEFICIENCY: ICD-10-CM

## 2025-05-30 DIAGNOSIS — F10.90 ALCOHOL USE: ICD-10-CM

## 2025-05-30 DIAGNOSIS — E87.1 HYPONATREMIA: Primary | ICD-10-CM

## 2025-05-30 DIAGNOSIS — Z09 HOSPITAL DISCHARGE FOLLOW-UP: ICD-10-CM

## 2025-05-30 DIAGNOSIS — I71.21 ANEURYSM OF ASCENDING AORTA WITHOUT RUPTURE: ICD-10-CM

## 2025-05-30 PROCEDURE — 99495 TRANSJ CARE MGMT MOD F2F 14D: CPT | Performed by: FAMILY MEDICINE

## 2025-05-30 NOTE — PROGRESS NOTES
Transitional Care Follow Up Visit  Subjective     Florence Dee is a 64 y.o. female who presents for a transitional care management visit.    Within 48 business hours after discharge our office contacted her via telephone to coordinate her care and needs.      I reviewed and discussed the details of that call along with the discharge summary, hospital problems, inpatient lab results, inpatient diagnostic studies, and consultation reports with Florence.     Current outpatient and discharge medications have been reconciled for the patient.  Reviewed by: Joann Hankins DO          5/24/2025     3:34 PM   Date of TCM Phone Call   [facility]   Date of Admission 5/19/2025   Date of Discharge 5/24/2025   Discharge Disposition Home or Self Care     Risk for Readmission (LACE) Score: 9 (5/24/2025  6:00 AM)      History of Present Illness  The patient presents for evaluation of hyponatremia, COPD exacerbation, hypertension, and ascending aortic aneurysm.    She was recently hospitalized from 05/19/2025 to 05/24/2025 due to hypoxia, COPD exacerbation, and hyponatremia, which necessitated the initiation of sodium tablets twice daily. She has been supplementing her diet with additional salt. She reports a decreased appetite since starting the steroids but has not experienced any episodes of waking up at night due to coughing or difficulty breathing. She has declined the use of B1 for withdrawal symptoms of alcohol while she was inpatient, citing a lack of previous withdrawal experiences.    During her hospital stay, her thyroid medication was increased from 110 mcg to 112 mcg.    Her HCTZ was discontinued during her hospital stay, but has since been resumed at a dose of 12.5 mg. She expresses concern about the necessity of this medication given her recent weight loss. She has the capability to monitor her blood pressure at home, which was recorded as 145/140 during her hospital stay.    She reports an  improvement in her respiratory status, with the ability to lie flat in bed without discomfort. She has not yet received a call from the pulmonologist. She used home oxygen on the first night post-discharge and found it beneficial. She also reports that her breathing treatments have been effective. In instances of shortness of breath at night, she employs calming techniques and deep breathing exercises to prevent panic. She experienced severe left-sided rib pain during her hospital stay, which was exacerbated by coughing.    She was not informed about the presence of an aneurysm in her ascending aorta, an incidental finding on imaging.    SOCIAL HISTORY  She reports no excessive alcohol intake, mentioning she might have 2 glasses occasionally while cooking and at dinner, but not every night.    FAMILY HISTORY  Her mother  of a brain aneurysm.    Course During Hospital Stay:  reviewed     The following portions of the patient's history were reviewed and updated as appropriate: allergies, current medications, past family history, past medical history, past social history, past surgical history, and problem list.        Objective   Physical Exam  Vitals and nursing note reviewed.   Constitutional:       General: She is not in acute distress.     Appearance: She is not ill-appearing or toxic-appearing.   Cardiovascular:      Rate and Rhythm: Normal rate and regular rhythm.      Heart sounds: Normal heart sounds.   Pulmonary:      Effort: Pulmonary effort is normal.      Breath sounds: Examination of the left-upper field reveals wheezing. Wheezing present.   Neurological:      Mental Status: She is alert.   Psychiatric:         Mood and Affect: Mood normal.         Behavior: Behavior normal.       Physical Exam        Assessment & Plan   Problems Addressed this Visit          Genitourinary and Reproductive     Hyponatremia - Primary    Relevant Orders    Vitamin B12    Basic Metabolic Panel    Vitamin B1, Whole Blood     Vitamin D,25-Hydroxy     Other Visit Diagnoses         Vitamin D deficiency        Relevant Orders    Vitamin B12    Basic Metabolic Panel    Vitamin B1, Whole Blood    Vitamin D,25-Hydroxy      Alcohol use        Relevant Orders    Vitamin B12    Basic Metabolic Panel    Vitamin B1, Whole Blood    Vitamin D,25-Hydroxy      COPD exacerbation        Relevant Orders    Vitamin B12    Basic Metabolic Panel    Vitamin B1, Whole Blood    Vitamin D,25-Hydroxy      Aneurysm of ascending aorta without rupture        Relevant Orders    Ambulatory Referral to Cardiology for Other; aortic aneursym      Hospital discharge follow-up        Relevant Orders    Vitamin B12    Basic Metabolic Panel    Vitamin B1, Whole Blood    Vitamin D,25-Hydroxy          Diagnoses         Codes Comments      Hyponatremia    -  Primary ICD-10-CM: E87.1  ICD-9-CM: 276.1       Vitamin D deficiency     ICD-10-CM: E55.9  ICD-9-CM: 268.9       Alcohol use     ICD-10-CM: F10.90  ICD-9-CM: 305.00       COPD exacerbation     ICD-10-CM: J44.1  ICD-9-CM: 491.21       Aneurysm of ascending aorta without rupture     ICD-10-CM: I71.21  ICD-9-CM: 441.2       Hospital discharge follow-up     ICD-10-CM: Z09  ICD-9-CM: V67.59           Assessment & Plan  1. Hyponatremia.  - Sodium levels were significantly low during the recent hospital stay.  - Currently on sodium tablets twice a day.  - A recheck of sodium levels will be conducted today.  - Depending on the results, adjustments to the medication regimen, including the potential discontinuation of HCTZ, will be considered. If sodium levels improve, the continuation of sodium tablets will be reassessed.    2. Chronic obstructive pulmonary disease (COPD) exacerbation.  - Hospitalized from 05/19/2025 to 05/24/2025 due to a COPD exacerbation and tested positive for parainfluenza virus.  - Oxygen levels were critically low, reaching 85% at one point.  - Reports improved breathing and no longer needs to use home  oxygen regularly.  - Advised to continue current breathing treatments and to follow up with pulmonology as previously referred.    3. Thyroid management.  - Thyroid medication dosage was recently increased from 110 mcg to 112 mcg.  - Recommended to maintain this dosage for at least a month before re-evaluating thyroid function.  - A recheck of thyroid function will be scheduled in the future, potentially during the annual visit in July 2025.    4. Hypertension.  - Currently on HCTZ and olmesartan.  - Given recent hyponatremia, the continuation of HCTZ will be reassessed based on today's lab results.  - If sodium levels remain low, discontinuing HCTZ and managing blood pressure with olmesartan alone will be considered.    5. Ascending aortic aneurysm.  - An incidental finding of an ascending aortic aneurysm was noted.  - A referral to cardiology will be placed for further evaluation and monitoring of the aneurysm.    Follow-up  The patient is scheduled for an annual visit on 07/02/2025.           Patient or patient representative verbalized consent for the use of Ambient Listening during the visit with  Joann Hankins DO for chart documentation. 6/2/2025  10:17 EDT

## 2025-06-05 ENCOUNTER — READMISSION MANAGEMENT (OUTPATIENT)
Dept: CALL CENTER | Facility: HOSPITAL | Age: 65
End: 2025-06-05
Payer: COMMERCIAL

## 2025-06-05 LAB
25(OH)D3+25(OH)D2 SERPL-MCNC: 30.1 NG/ML (ref 30–100)
BUN SERPL-MCNC: 8 MG/DL (ref 8–27)
BUN/CREAT SERPL: 13 (ref 12–28)
CALCIUM SERPL-MCNC: 9.7 MG/DL (ref 8.7–10.3)
CHLORIDE SERPL-SCNC: 90 MMOL/L (ref 96–106)
CO2 SERPL-SCNC: 25 MMOL/L (ref 20–29)
CREAT SERPL-MCNC: 0.6 MG/DL (ref 0.57–1)
EGFRCR SERPLBLD CKD-EPI 2021: 101 ML/MIN/1.73
GLUCOSE SERPL-MCNC: 108 MG/DL (ref 70–99)
POTASSIUM SERPL-SCNC: 5.6 MMOL/L (ref 3.5–5.2)
SODIUM SERPL-SCNC: 129 MMOL/L (ref 134–144)
VIT B1 BLD-SCNC: 187.1 NMOL/L (ref 66.5–200)
VIT B12 SERPL-MCNC: 562 PG/ML (ref 232–1245)

## 2025-06-05 NOTE — OUTREACH NOTE
COPD/PN Week 2 Survey      Flowsheet Row Responses   [facility] patient discharged from? [place]   Does the patient have one of the following disease processes/diagnoses(primary or secondary)? COPD   Week 2 attempt successful? Yes   Call start time 1320   Call end time 1324   Discharge diagnosis Acute hypoxic respiratory failure secondary to parainfluenza infection, multifocal pneumonia, and COPD exacerbation, hyponatremia   Meds reviewed with patient/caregiver? Yes   Is the patient having any side effects they believe may be caused by any medication additions or changes? No   Does the patient have all medications ordered at discharge? Yes   Is the patient taking all medications as directed (includes completed medication regime)? Yes   Does the patient have a primary care provider?  Yes   Does the patient have an appointment with their PCP or specialist within 7 days of discharge? Yes   Has the patient kept scheduled appointments due by today? Yes   Has home health visited the patient within 72 hours of discharge? N/A   DME comments no longer using O2, had returned   Pulse Ox monitoring Intermittent   Pulse Ox device source Patient   O2 Sat comments 95% on RA, stable even on exertion   O2 Sat: education provided Sat levels, Monitoring frequency, When to seek care   Psychosocial issues? No   Did the patient receive a copy of their discharge instructions? Yes   Nursing interventions Reviewed instructions with patient   What is the patient's perception of their health status since discharge? Improving   Nursing Interventions Nurse provided patient education   Are the patient's immunizations up to date?  Yes  [all except flu vaccine, does not take]   Is the patient/caregiver able to teach back the hierarchy of who to call/visit for symptoms/problems? PCP, Specialist, Home health nurse, Urgent Care, ED, 911 Yes   Is the patient able to teach back COPD zones? Yes   Patient reports what zone on this call? Green  Zone   Green Zone Reports doing well, Breathing without shortness of breath, Usual activity and exercise level, Usual amounts of cough and phlegm/mucous, Slept well last night, Appetite is good   Green Zone interventions: Take daily medications, Use oxygen as prescribed, Continue regular exercise/diet plan   Week 2 call completed? Yes   Call end time 1324            Cookie GONZALEZ - Registered Nurse

## 2025-06-06 ENCOUNTER — OFFICE VISIT (OUTPATIENT)
Dept: CARDIOLOGY | Facility: CLINIC | Age: 65
End: 2025-06-06
Payer: COMMERCIAL

## 2025-06-06 VITALS
WEIGHT: 124.6 LBS | HEART RATE: 81 BPM | DIASTOLIC BLOOD PRESSURE: 68 MMHG | SYSTOLIC BLOOD PRESSURE: 128 MMHG | HEIGHT: 64 IN | OXYGEN SATURATION: 98 % | BODY MASS INDEX: 21.27 KG/M2

## 2025-06-06 DIAGNOSIS — I10 PRIMARY HYPERTENSION: Primary | ICD-10-CM

## 2025-06-06 DIAGNOSIS — I71.21 ANEURYSM OF ASCENDING AORTA WITHOUT RUPTURE: ICD-10-CM

## 2025-06-06 DIAGNOSIS — E78.5 DYSLIPIDEMIA: ICD-10-CM

## 2025-06-06 RX ORDER — BISOPROLOL FUMARATE 5 MG/1
5 TABLET, FILM COATED ORAL DAILY
Qty: 30 TABLET | Refills: 11 | Status: SHIPPED | OUTPATIENT
Start: 2025-06-06

## 2025-06-06 RX ORDER — OLMESARTAN MEDOXOMIL 20 MG/1
20 TABLET ORAL DAILY
Qty: 30 TABLET | Refills: 11 | Status: SHIPPED | OUTPATIENT
Start: 2025-06-06

## 2025-06-06 RX ORDER — ASPIRIN 81 MG/1
81 TABLET ORAL DAILY
Start: 2025-06-06

## 2025-06-06 NOTE — PROGRESS NOTES
[facility] Cardiology  Consultation H&P  Florence Dee  1960  [address] [place] [address]     VISIT DATE:  06/06/25    PCP: Joann Hankins  [address] [place] [address]    IDENTIFICATION: A 64 y.o. female employed per [employer]    PROBLEM LIST:   Ascending aortic aneurysm  5/25 CTA chest: Asc ao 4.3 cm, minimal CAC  HTN  HLD  1/24  [phone]-  Hypothyroidism  Hyponatremia  COPD  Former tobacco, quit 2021  Surgical history:  Orbital decompression  Corneal implant  Sinus surgery      CC:  Chief Complaint   Patient presents with    Aneurysm of Ascending Aorta       Allergies  No Known Allergies    Current Medications  Current Outpatient Medications   Medication Instructions    albuterol (ACCUNEB) 1.25 mg, Nebulization, Every 6 Hours PRN    arformoterol (BROVANA) 15 MCG/2ML nebulizer solution Take 2 mL (1 vial) by nebulization 2 (Two) Times a Day for 30 days.    aspirin 81 mg, Oral, Daily    bisoprolol (ZEBETA) 5 mg, Oral, Daily    budesonide (PULMICORT) 0.5 MG/2ML nebulizer solution Take 2 mL (1 vial) by nebulization 2 (Two) Times a Day for 30 days.    citalopram (CELEXA) 20 mg, Oral, Daily    fenofibrate (TRICOR) 48 mg, Oral, Daily    folic acid (FOLVITE) 1 mg, Oral, Daily    levothyroxine (SYNTHROID, LEVOTHROID) 112 mcg, Oral, Every Morning    montelukast (SINGULAIR) 10 mg, Oral, Nightly    olmesartan (BENICAR) 20 mg, Oral, Daily    rosuvastatin (CRESTOR) 40 mg, Oral, Daily    sodium chloride 1 g, Oral, 2 Times Daily With Meals    thiamine (VITAMIN B1) 100 mg, Oral, Daily    Ventolin  (90 Base) MCG/ACT inhaler 2 puffs, Inhalation, Every 4 Hours PRN        History of Present Illness   HPI  Florence Dee is a 64 y.o. year old female with the above mentioned PMH who presents for consult from Joann Hankins DO for further evaluation of ascending aortic aneurysm found incidentally during hospitalization for pneumonia and COPD  "exacerbation.  She reports home blood pressures are at target.     Pt denies any chest pain, dyspnea at rest, dyspnea on exertion, orthopnea, PND, palpitations, lower extremity edema, or claudication. Pt denies history of CHF, DVT, PE, MI, CVA, TIA, or rheumatic fever.       ROS  Review of Systems   Constitutional: Positive for malaise/fatigue.   Cardiovascular:  Positive for dyspnea on exertion.       SOCIAL HX  Social History     Socioeconomic History    Marital status:    Tobacco Use    Smoking status: Former     Current packs/day: 0.00     Average packs/day: 0.5 packs/day for 25.0 years (12.5 ttl pk-yrs)     Types: Cigarettes     Start date: 1994     Quit date: 2019     Years since quittin.4    Smokeless tobacco: Never   Vaping Use    Vaping status: Never Used   Substance and Sexual Activity    Alcohol use: Yes     Alcohol/week: 5.0 - 6.0 standard drinks of alcohol     Types: 5 - 6 Glasses of wine per week    Drug use: No    Sexual activity: Defer       FAMILY HX  Family History   Problem Relation Age of Onset    Hypertension Mother     Stroke Mother     Hypertension Father     Stroke Father     Breast cancer Neg Hx     Ovarian cancer Neg Hx        OBJECTIVE:  Vitals:    25 0941   BP: 128/68   Pulse: 81   SpO2: 98%   Weight: 56.5 kg (124 lb 9.6 oz)   Height: 162.6 cm (64\")     Body mass index is 21.39 kg/m².     PHYSICAL EXAMINATION:  Vitals reviewed.   Neck:      Vascular: No carotid bruit or JVD.   Pulmonary:      Effort: Pulmonary effort is normal.      Breath sounds: Scattered Wheezing present.   Chest:      Chest wall: Not tender to palpatation.   Cardiovascular:      Normal rate. Regular rhythm. Normal S1. Normal S2.    Pulses:     Intact distal pulses.   Edema:     Peripheral edema absent.   Skin:     General: Skin is warm and dry.   Neurological:      General: No focal deficit present.      Mental Status: Alert and oriented to person, place and time.         Diagnostic " Data:  EKG reviewed from 5/19/2025.  Sinus rhythm noted, rate 94 bpm.  Do not agree with interpretation reading atrial flutter.  Procedures    LABS:    Lab Results   Component Value Date    CHLPL 216 (H) 01/04/2024    TRIG 175 (H) 01/04/2024    HDL 83 (H) 01/04/2024     (H) 01/04/2024      Lab Results   Component Value Date    GLUCOSE 108 (H) 05/30/2025    CALCIUM 9.7 05/30/2025     (L) 05/30/2025    K 5.6 (H) 05/30/2025    CO2 25 05/30/2025    CL 90 (L) 05/30/2025    BUN 8 05/30/2025    CREATININE 0.60 05/30/2025    EGFR 101 05/30/2025    BCR 13 05/30/2025    ANIONGAP 9.0 05/22/2025      Lab Results   Component Value Date    WBC 10.51 05/20/2025    HGB 13.1 05/20/2025    HCT 38.7 05/20/2025    MCV 99.0 (H) 05/20/2025     05/20/2025     Lab Results   Component Value Date    HGBA1C 5.30 07/31/2020      Lab Results   Component Value Date    TSH 7.780 (H) 05/23/2025      T4   0.84 (L)  05/23/2025    Advance Care Planning   ACP discussion was declined by the patient. Patient does not have an advance directive, declines further assistance.         ASSESSMENT:     Diagnosis Plan   1. Primary hypertension  Adult Transthoracic Echo Complete W/ Cont if Necessary Per Protocol      2. Dyslipidemia        3. Aneurysm of ascending aorta without rupture  Adult Transthoracic Echo Complete W/ Cont if Necessary Per Protocol          PLAN:    Ascending aortic aneurysm, 4.3 cm on CT chest.  Mild coronary artery calcification  - Check echocardiogram to assess for structural heart disease  - Maintain BP to less than 130/80  - Start EC aspirin 81 mg daily  - Start low dose beta blocker to reduce aortic pressures, bisoprolol 5 mg daily.    Hypertension, well-controlled  - Will discontinue HCTZ due to hyponatremia  - continue olmesartan, add bisoprolol  - Monitor home BP and notify office if BP elevates above target.    HLD, LDL goal < 70  - Continue rosuvastatin   - repeat lipid panel with next PCP labs.       Cr  Joann Kang DO, thank you for referring Ms. Dee for evaluation.  I have forwarded my electronically generated recommendations to you for review.  Please do not hesitate to call with any questions.    Scribed for Cisco Holt MD by OSITO Monroy. 6/6/2025  10:20 EDT   Cisco Holt MD, FACC

## 2025-06-09 RX ORDER — ALBUTEROL SULFATE 90 UG/1
2 AEROSOL, METERED RESPIRATORY (INHALATION) EVERY 4 HOURS PRN
Qty: 18 G | Refills: 0 | Status: SHIPPED | OUTPATIENT
Start: 2025-06-09

## 2025-06-09 RX ORDER — ALBUTEROL SULFATE 90 UG/1
2 INHALANT RESPIRATORY (INHALATION) EVERY 4 HOURS PRN
Qty: 8.5 G | OUTPATIENT
Start: 2025-06-09

## 2025-06-11 DIAGNOSIS — E03.9 ACQUIRED HYPOTHYROIDISM: ICD-10-CM

## 2025-06-11 DIAGNOSIS — J45.20 MILD INTERMITTENT ASTHMA WITHOUT COMPLICATION: ICD-10-CM

## 2025-06-11 DIAGNOSIS — E78.2 MIXED HYPERLIPIDEMIA: ICD-10-CM

## 2025-06-11 DIAGNOSIS — I10 PRIMARY HYPERTENSION: ICD-10-CM

## 2025-06-16 ENCOUNTER — READMISSION MANAGEMENT (OUTPATIENT)
Dept: CALL CENTER | Facility: HOSPITAL | Age: 65
End: 2025-06-16
Payer: COMMERCIAL

## 2025-06-16 RX ORDER — OLMESARTAN MEDOXOMIL AND HYDROCHLOROTHIAZIDE 20/12.5 20; 12.5 MG/1; MG/1
1 TABLET ORAL DAILY
Qty: 90 TABLET | Refills: 1 | OUTPATIENT
Start: 2025-06-16

## 2025-06-16 RX ORDER — LEVOTHYROXINE SODIUM 100 UG/1
100 TABLET ORAL EVERY MORNING
Qty: 90 TABLET | Refills: 3 | OUTPATIENT
Start: 2025-06-16

## 2025-06-16 RX ORDER — CITALOPRAM HYDROBROMIDE 40 MG/1
40 TABLET ORAL DAILY
Qty: 90 TABLET | Refills: 3 | OUTPATIENT
Start: 2025-06-16

## 2025-06-16 RX ORDER — MONTELUKAST SODIUM 10 MG/1
10 TABLET ORAL NIGHTLY
Qty: 90 TABLET | Refills: 1 | Status: SHIPPED | OUTPATIENT
Start: 2025-06-16

## 2025-06-16 RX ORDER — ROSUVASTATIN CALCIUM 40 MG/1
40 TABLET, COATED ORAL DAILY
Qty: 90 TABLET | Refills: 1 | Status: SHIPPED | OUTPATIENT
Start: 2025-06-16

## 2025-06-16 NOTE — OUTREACH NOTE
COPD/PN Week 3 Survey      Flowsheet Row Responses   [facility] facility patient discharged from? [place]   Does the patient have one of the following disease processes/diagnoses(primary or secondary)? COPD   Week 3 attempt successful? No   Unsuccessful attempts Attempt 1   Revoke Mesha García H - Registered Nurse

## 2025-06-19 RX ORDER — CITALOPRAM HYDROBROMIDE 20 MG/1
20 TABLET ORAL DAILY
Qty: 90 TABLET | Refills: 0 | Status: SHIPPED | OUTPATIENT
Start: 2025-06-19

## 2025-06-19 RX ORDER — FOLIC ACID 1 MG/1
1 TABLET ORAL DAILY
Qty: 30 TABLET | Refills: 0 | Status: SHIPPED | OUTPATIENT
Start: 2025-06-19 | End: 2025-07-19

## 2025-06-19 RX ORDER — LEVOTHYROXINE SODIUM 112 UG/1
112 TABLET ORAL EVERY MORNING
Qty: 30 TABLET | Refills: 0 | Status: SHIPPED | OUTPATIENT
Start: 2025-06-19

## 2025-06-19 NOTE — TELEPHONE ENCOUNTER
"    Caller: Florence Dee \"Jan\"    Relationship: Self    Best call back number: [phone]     Requested Prescriptions:   Requested Prescriptions     Pending Prescriptions Disp Refills    levothyroxine (SYNTHROID, LEVOTHROID) 112 MCG tablet 30 tablet 0     Sig: Take 1 tablet by mouth Every Morning for 30 days.    folic acid (FOLVITE) 1 MG tablet 30 tablet 0     Sig: Take 1 tablet by mouth Daily for 30 days.    citalopram (CeleXA) 20 MG tablet 30 tablet 0     Sig: Take 1 tablet by mouth Daily for 30 days.        Pharmacy where request should be sent: [id number] - [place], [place] - [address] AT [facility] & [address] - [phone]  - [phone] FX     Last office visit with prescribing clinician: 5/30/2025   Last telemedicine visit with prescribing clinician: Visit date not found   Next office visit with prescribing clinician: 7/2/2025     Additional details provided by patient:     Does the patient have less than a 3 day supply:  [x] Yes  [] No    Would you like a call back once the refill request has been completed: [] Yes [x] No    If the office needs to give you a call back, can they leave a voicemail: [] Yes [x] No    Ministerio Mukherjee   06/19/25 11:56 EDT       "

## 2025-06-27 ENCOUNTER — TELEPHONE (OUTPATIENT)
Dept: FAMILY MEDICINE CLINIC | Facility: CLINIC | Age: 65
End: 2025-06-27
Payer: COMMERCIAL

## 2025-06-27 DIAGNOSIS — J20.9 ACUTE BRONCHITIS, UNSPECIFIED ORGANISM: ICD-10-CM

## 2025-06-27 RX ORDER — ALBUTEROL SULFATE 90 UG/1
2 AEROSOL, METERED RESPIRATORY (INHALATION) EVERY 4 HOURS PRN
Qty: 18 G | Refills: 0 | Status: SHIPPED | OUTPATIENT
Start: 2025-06-27 | End: 2025-06-29 | Stop reason: SDUPTHER

## 2025-06-27 RX ORDER — ALBUTEROL SULFATE 1.25 MG/3ML
1 SOLUTION RESPIRATORY (INHALATION) EVERY 6 HOURS PRN
Qty: 300 ML | Refills: 1 | Status: SHIPPED | OUTPATIENT
Start: 2025-06-27

## 2025-06-27 NOTE — TELEPHONE ENCOUNTER
Patient stated she would like to go back on the Albuterol HFA and she also needs a refill on the Albuterol for her Nebulizer.

## 2025-06-27 NOTE — TELEPHONE ENCOUNTER
"Caller: [id number] [place] [facility] [place] [place] [facility] [address] AT [facility] & [address] [facility] - [phone]  - [phone] FX    Relationship: Pharmacy    Best call back number: [phone]     Which medication are you concerned about: Ventolin  (90 Base) MCG/ACT inhaler     What are your concerns: PHARMACY IS CALLING AND STATES THIS MEDICATION HAS BEEN CALLED IN WITH A \"BRYAN 1\" ON THE SCRIPT. THE INSURANCE DOES NOT COVER THIS AND WOULD LIKE TO SEE IF THAT CAN BE TAKEN OFF.         "

## 2025-06-27 NOTE — TELEPHONE ENCOUNTER
"Caller: Florence Dee \"\"    Relationship: Self    Best call back number: [phone]    Requested Prescriptions:   Requested Prescriptions     Pending Prescriptions Disp Refills    albuterol (ACCUNEB) 1.25 MG/3ML nebulizer solution 300 mL 1     Sig: Take 3 mL by nebulization Every 6 (Six) Hours As Needed for Shortness of Air.    Ventolin  (90 Base) MCG/ACT inhaler 18 g 0     Si puffs Every 4 (Four) Hours As Needed for Wheezing.        Pharmacy where request should be sent: [id number] AT [facility] - [place], [place] -  [address] AT [facility] & [address] [facility] - [phone]  - [phone] FX     Last office visit with prescribing clinician: 2025   Last telemedicine visit with prescribing clinician: Visit date not found   Next office visit with prescribing clinician: 2025     Additional details provided by patient:     Does the patient have less than a 3 day supply:  [x] Yes  [] No    Would you like a call back once the refill request has been completed: [] Yes [x] No    If the office needs to give you a call back, can they leave a voicemail: [] Yes [x] No    [facility]   25 08:55 EDT     "

## 2025-06-29 RX ORDER — ALBUTEROL SULFATE 90 UG/1
2 AEROSOL, METERED RESPIRATORY (INHALATION) EVERY 4 HOURS PRN
Qty: 18 G | Refills: 0 | OUTPATIENT
Start: 2025-06-29

## 2025-06-29 RX ORDER — ALBUTEROL SULFATE 90 UG/1
2 INHALANT RESPIRATORY (INHALATION) EVERY 4 HOURS PRN
Qty: 18 G | Refills: 2 | Status: SHIPPED | OUTPATIENT
Start: 2025-06-29

## 2025-06-29 RX ORDER — ALBUTEROL SULFATE 90 UG/1
2 INHALANT RESPIRATORY (INHALATION) EVERY 4 HOURS PRN
Qty: 8.5 G | OUTPATIENT
Start: 2025-06-29

## 2025-07-02 ENCOUNTER — OFFICE VISIT (OUTPATIENT)
Dept: FAMILY MEDICINE CLINIC | Facility: CLINIC | Age: 65
End: 2025-07-02
Payer: COMMERCIAL

## 2025-07-02 VITALS
WEIGHT: 123.2 LBS | DIASTOLIC BLOOD PRESSURE: 80 MMHG | BODY MASS INDEX: 21.03 KG/M2 | OXYGEN SATURATION: 95 % | TEMPERATURE: 97.7 F | RESPIRATION RATE: 18 BRPM | HEART RATE: 70 BPM | SYSTOLIC BLOOD PRESSURE: 138 MMHG | HEIGHT: 64 IN

## 2025-07-02 DIAGNOSIS — Z00.00 ANNUAL PHYSICAL EXAM: Primary | ICD-10-CM

## 2025-07-02 DIAGNOSIS — E87.1 HYPONATREMIA: ICD-10-CM

## 2025-07-02 DIAGNOSIS — E78.2 MIXED HYPERLIPIDEMIA: ICD-10-CM

## 2025-07-02 DIAGNOSIS — I10 PRIMARY HYPERTENSION: ICD-10-CM

## 2025-07-02 DIAGNOSIS — E55.9 VITAMIN D DEFICIENCY: ICD-10-CM

## 2025-07-02 DIAGNOSIS — Z12.31 ENCOUNTER FOR SCREENING MAMMOGRAM FOR MALIGNANT NEOPLASM OF BREAST: ICD-10-CM

## 2025-07-02 RX ORDER — OLMESARTAN MEDOXOMIL 40 MG/1
40 TABLET ORAL DAILY
Qty: 90 TABLET | Refills: 1 | Status: SHIPPED | OUTPATIENT
Start: 2025-07-02

## 2025-07-02 NOTE — PROGRESS NOTES
Chief Complaint  Annual Exam (Pt is fasting. )    Subjective          Florence Dee presents to [facility]    History of Present Illness  The patient presents for an annual exam.    She expresses concern about her blood pressure, reporting readings around 139 to 140 systolic. Her cardiologist has advised maintaining her systolic blood pressure below 130. She recalls that her blood pressure medication was previously reduced from 40 mg to 20 mg. She typically maintains an active lifestyle, except during hot and humid weather conditions. Currently, she is on olmesartan 20 mg and bisoprolol 5 mg.    She has been diagnosed with an aneurysm of the ascending aorta and is under the care of Dr. Holt. A follow-up visit with him is scheduled in a year.    She is due for a mammogram. She had a colonoscopy in 09/2020 with Dr. Martin, who recommended a repeat in 10 years. Her vision has been checked recently, and she is up-to-date with her dental cleaning. She is due for a Pap smear in 01/2027.    Additionally, she mentions that her thyroid function needs to be rechecked.      The following portions of the patient's history were reviewed and updated as appropriate: allergies, current medications, past family history, past medical history, past social history, past surgical history, and problem list.    Objective      Physical Exam  Vitals and nursing note reviewed.   HENT:      Right Ear: Tympanic membrane, ear canal and external ear normal.      Left Ear: Tympanic membrane, ear canal and external ear normal.   Cardiovascular:      Rate and Rhythm: Normal rate and regular rhythm.      Heart sounds: Normal heart sounds.   Pulmonary:      Effort: Pulmonary effort is normal.      Breath sounds: Normal breath sounds.   Neurological:      Mental Status: She is alert.   Psychiatric:         Mood and Affect: Mood normal.        Physical Exam      Result Review :       Results                Assessment and Plan    Diagnoses and all orders for this visit:    1. Annual physical exam (Primary)  -     Comprehensive Metabolic Panel  -     CBC (No Diff)  -     Lipid Panel With / Chol / HDL Ratio  -     TSH+Free T4  -     Vitamin D,25-Hydroxy    2. Mixed hyperlipidemia  -     Comprehensive Metabolic Panel  -     CBC (No Diff)  -     Lipid Panel With / Chol / HDL Ratio  -     TSH+Free T4  -     Vitamin D,25-Hydroxy    3. Primary hypertension  -     Comprehensive Metabolic Panel  -     CBC (No Diff)  -     Lipid Panel With / Chol / HDL Ratio  -     TSH+Free T4  -     Vitamin D,25-Hydroxy  -     olmesartan (Benicar) 40 MG tablet; Take 1 tablet by mouth Daily.  Dispense: 90 tablet; Refill: 1    4. Vitamin D deficiency  -     Comprehensive Metabolic Panel  -     CBC (No Diff)  -     Lipid Panel With / Chol / HDL Ratio  -     TSH+Free T4  -     Vitamin D,25-Hydroxy    5. Hyponatremia  -     Comprehensive Metabolic Panel  -     CBC (No Diff)  -     Lipid Panel With / Chol / HDL Ratio  -     TSH+Free T4  -     Vitamin D,25-Hydroxy    6. Encounter for screening mammogram for malignant neoplasm of breast  -     Mammo Screening Digital Tomosynthesis Bilateral With CAD; Future      Assessment & Plan  1. Hypertension.  - Blood pressure readings are slightly elevated, with systolic values around 139-140 mmHg.  - The goal is to maintain blood pressure below 130/80 mmHg.  - Dosage of olmesartan will be increased from 20 mg to 40 mg.  - Advised to monitor blood pressure closely with this change. If uncontrolled after a few weeks, plan to reduce olmesartan back to 20 mg and increase bisoprolol dosage from 5 mg to 10 mg.     2. Aneurysm of the ascending aorta.  - Currently on bisoprolol 5 mg, which helps reduce stress on the heart and aorta.  - Continuation of bisoprolol is recommended to manage the aneurysm.  - If blood pressure control remains inadequate, the bisoprolol dosage may be increased.    3. Thyroid disorder.  -  Thyroid function will be rechecked today.  - Determination of any adjustments to medication will be based on the results.    4. Health maintenance.  - Mammogram will be ordered for breast cancer screening.  - Last colonoscopy was in 09/2020, and the next one is due in 2030.  - Up-to-date with vision and dental check-ups.  - Last Pap smear was in 01/2024, and the next one is due in 01/2027.        Follow Up   Return in about 6 months (around 1/2/2026) for Recheck.    Patient or patient representative verbalized consent for the use of Ambient Listening during the visit with  Joann Hankins DO for chart documentation. 7/2/2025  13:51 EDT  Patient was given instructions and counseling regarding her condition or for health maintenance advice. Please see specific information pulled into the AVS if appropriate.

## 2025-07-02 NOTE — PROGRESS NOTES
Chief Complaint  Annual Exam (Pt is fasting. )    Subjective     {Problem List  Visit Diagnosis   Encounters  Notes  Medications  Labs  Result Review Imaging  Media :23}     Florence Dee presents to [facility]  History of Present Illness      {Common H&P Review Areas:17188}    Objective      Physical Exam   Result Review :{Labs  Result Review  Imaging  Med Tab  Media :23}   {The following data was reviewed by (Optional):90015}  {Ambulatory Labs (Optional):54339}           Assessment and Plan {CC Problem List  Visit Diagnosis  ROS  Review (Popup)  Health Maintenance  Quality  BestPractice  Medications  SmartSets  SnapShot Encounters  Media :23}   There are no diagnoses linked to this encounter.  {Time Spent (Optional):68599}  Follow Up {Instructions Charge Capture  Follow-up Communications :23}  No follow-ups on file.  Patient was given instructions and counseling regarding her condition or for health maintenance advice. Please see specific information pulled into the AVS if appropriate.

## 2025-07-03 LAB
25(OH)D3+25(OH)D2 SERPL-MCNC: 38.5 NG/ML (ref 30–100)
ALBUMIN SERPL-MCNC: 4.5 G/DL (ref 3.9–4.9)
ALP SERPL-CCNC: 83 IU/L (ref 44–121)
ALT SERPL-CCNC: 35 IU/L (ref 0–32)
AST SERPL-CCNC: 35 IU/L (ref 0–40)
BILIRUB SERPL-MCNC: 0.9 MG/DL (ref 0–1.2)
BUN SERPL-MCNC: 8 MG/DL (ref 8–27)
BUN/CREAT SERPL: 12 (ref 12–28)
CALCIUM SERPL-MCNC: 9.5 MG/DL (ref 8.7–10.3)
CHLORIDE SERPL-SCNC: 95 MMOL/L (ref 96–106)
CHOLEST SERPL-MCNC: 211 MG/DL (ref 100–199)
CHOLEST/HDLC SERPL: 2.3 RATIO (ref 0–4.4)
CO2 SERPL-SCNC: 23 MMOL/L (ref 20–29)
CREAT SERPL-MCNC: 0.65 MG/DL (ref 0.57–1)
EGFRCR SERPLBLD CKD-EPI 2021: 99 ML/MIN/1.73
ERYTHROCYTE [DISTWIDTH] IN BLOOD BY AUTOMATED COUNT: 14.1 % (ref 11.7–15.4)
GLOBULIN SER CALC-MCNC: 2.4 G/DL (ref 1.5–4.5)
GLUCOSE SERPL-MCNC: 86 MG/DL (ref 70–99)
HCT VFR BLD AUTO: 47.3 % (ref 34–46.6)
HDLC SERPL-MCNC: 92 MG/DL
HGB BLD-MCNC: 15.4 G/DL (ref 11.1–15.9)
LDLC SERPL CALC-MCNC: 106 MG/DL (ref 0–99)
MCH RBC QN AUTO: 33.9 PG (ref 26.6–33)
MCHC RBC AUTO-ENTMCNC: 32.6 G/DL (ref 31.5–35.7)
MCV RBC AUTO: 104 FL (ref 79–97)
PLATELET # BLD AUTO: 295 X10E3/UL (ref 150–450)
POTASSIUM SERPL-SCNC: 4.8 MMOL/L (ref 3.5–5.2)
PROT SERPL-MCNC: 6.9 G/DL (ref 6–8.5)
RBC # BLD AUTO: 4.54 X10E6/UL (ref 3.77–5.28)
SODIUM SERPL-SCNC: 134 MMOL/L (ref 134–144)
T4 FREE SERPL-MCNC: 1.18 NG/DL (ref 0.82–1.77)
TRIGL SERPL-MCNC: 73 MG/DL (ref 0–149)
TSH SERPL DL<=0.005 MIU/L-ACNC: 2.13 UIU/ML (ref 0.45–4.5)
VLDLC SERPL CALC-MCNC: 13 MG/DL (ref 5–40)
WBC # BLD AUTO: 6.6 X10E3/UL (ref 3.4–10.8)

## 2025-07-20 LAB
NCCN CRITERIA FLAG: NORMAL
TYRER CUZICK SCORE: 11.6

## 2025-07-21 DIAGNOSIS — E78.2 MIXED HYPERLIPIDEMIA: ICD-10-CM

## 2025-07-21 RX ORDER — FENOFIBRATE 48 MG/1
48 TABLET, FILM COATED ORAL DAILY
Qty: 90 TABLET | Refills: 3 | Status: SHIPPED | OUTPATIENT
Start: 2025-07-21

## 2025-07-24 RX ORDER — ALBUTEROL SULFATE 90 UG/1
2 AEROSOL, METERED RESPIRATORY (INHALATION) EVERY 4 HOURS PRN
Qty: 18 G | Refills: 2 | Status: SHIPPED | OUTPATIENT
Start: 2025-07-24

## 2025-08-01 RX ORDER — LEVOTHYROXINE SODIUM 112 UG/1
112 TABLET ORAL EVERY MORNING
Qty: 30 TABLET | Refills: 0 | Status: SHIPPED | OUTPATIENT
Start: 2025-08-01